# Patient Record
Sex: FEMALE | Race: WHITE | NOT HISPANIC OR LATINO | Employment: OTHER | URBAN - METROPOLITAN AREA
[De-identification: names, ages, dates, MRNs, and addresses within clinical notes are randomized per-mention and may not be internally consistent; named-entity substitution may affect disease eponyms.]

---

## 2017-02-23 ENCOUNTER — OFFICE VISIT (OUTPATIENT)
Dept: CARDIOLOGY | Facility: CLINIC | Age: 82
End: 2017-02-23

## 2017-02-23 VITALS
WEIGHT: 226.2 LBS | DIASTOLIC BLOOD PRESSURE: 86 MMHG | HEART RATE: 74 BPM | HEIGHT: 60 IN | BODY MASS INDEX: 44.41 KG/M2 | SYSTOLIC BLOOD PRESSURE: 120 MMHG

## 2017-02-23 DIAGNOSIS — Q21.12 PFO (PATENT FORAMEN OVALE): ICD-10-CM

## 2017-02-23 DIAGNOSIS — E78.5 DYSLIPIDEMIA: ICD-10-CM

## 2017-02-23 DIAGNOSIS — I25.9 ISCHEMIC HEART DISEASE: Primary | ICD-10-CM

## 2017-02-23 PROCEDURE — 99213 OFFICE O/P EST LOW 20 MIN: CPT | Performed by: INTERNAL MEDICINE

## 2017-02-23 RX ORDER — HYDROCODONE BITARTRATE AND ACETAMINOPHEN 5; 325 MG/1; MG/1
2 TABLET ORAL 3 TIMES DAILY
COMMUNITY
End: 2017-10-02 | Stop reason: HOSPADM

## 2017-02-23 RX ORDER — POLYETHYLENE GLYCOL 3350 17 G/17G
17 POWDER, FOR SOLUTION ORAL DAILY PRN
COMMUNITY
End: 2017-10-02 | Stop reason: HOSPADM

## 2017-02-23 NOTE — PROGRESS NOTES
Iliana Oleary  1934  [phone]      02/23/2017    Sreekanth Shi, APRN    Chief Complaint   Patient presents with   • Follow-up   • Coronary Artery Disease     PROBLEM LIST:  1.  Ischemic heart disease:  a. Cardiac catheterization: Dr. Warner,  01/06/2009.   i. PCI to proximal LAD (2.75 x 28 mm Promus CIRO).     b. Cardiac catheterization: Dr. Mejia, 08/03/2009: No in-stent restenosis or coronary artery obstruction; normal LV function.  c. Adenosine Cardiolite stress test, 04/30/2014:  Negative for ischemia and scar; LVEF (73%) without WMA.   d. Stress test with PET, 7/11/2016: EF 67%. No evidence of inducible ischemia by scintigraphic criteria.  e. Echocardiogram, 7/11/2016: EF 60%. Mild MR. Mild TR.  2. Hypertension.   3. Dyslipidemia.   4. PFO versus small ASD (echocardiogram on 05/06/2011) - asymptomatic.    5. Morbid obesity; BMI > 40.    6. Osteoarthritis.   7. Status post surgery, remote:  a. Hemorrhoidectomy, 1966.  b. Right  heel spur surgery, 1988.  c. Left ankle fracture stabilization, 1988.  d. Left hand trigger finger; Right hand trigger finger repair, 1996.    e. Cholecystectomy, May 2000.  f. Left ankle screw and bone chip removal, November 2001.  g. Anal fistulectomy,  October 2002.  h. Right flank abscess (staph), May 2005.  i. Left total knee replacement, October 2007.  j. Right knee replacement, August 2016      Allergies   Allergen Reactions   • Codeine    • Morphine And Related        Current Medications:      Current Outpatient Prescriptions:   •  aspirin 81 MG EC tablet, Take 81 mg by mouth every morning., Disp: , Rfl:   •  atorvastatin (LIPITOR) 40 MG tablet, Take 40 mg by mouth daily., Disp: , Rfl:   •  Budesonide (ENTOCORT EC) 3 MG 24 hr capsule, Take 3 mg by mouth 4 (Four) Times a Week., Disp: , Rfl:   •  diclofenac (VOLTAREN) 50 MG EC tablet, Take 50 mg by mouth 2 (two) times a day., Disp: , Rfl:   •  diphenhydrAMINE-acetaminophen (TYLENOL PM)  MG tablet per  "tablet, Take 1 tablet by mouth every night., Disp: , Rfl:   •  FLUoxetine (PROzac) 20 MG capsule, Take 20 mg by mouth daily., Disp: , Rfl:   •  gabapentin (NEURONTIN) 100 MG capsule, Take 200 mg by mouth every morning. 200mg every evening, Disp: , Rfl:   •  hydrochlorothiazide (HYDRODIURIL) 25 MG tablet, Take 25 mg by mouth daily., Disp: , Rfl:   •  HYDROcodone-acetaminophen (NORCO) 5-325 MG per tablet, Take 2 tablets by mouth 3 (Three) Times a Day., Disp: , Rfl:   •  lansoprazole (PREVACID) 30 MG capsule, Take 30 mg by mouth daily., Disp: , Rfl:   •  lisinopril (PRINIVIL,ZESTRIL) 10 MG tablet, Take 5 mg by mouth Daily., Disp: , Rfl:   •  Multiple Vitamins-Minerals (MULTIVITAMIN PO), Take 1 tablet by mouth daily., Disp: , Rfl:   •  polyethylene glycol (MIRALAX) packet, Take 17 g by mouth Daily., Disp: , Rfl:     HPI    Ms. Oleary presents today for follow up for ischemic heart disease. Since last visit, patient has been doing well from the cardiac standpoint.  She has been having chronic back pain that is seeming to get worse and is considering the possibility of having surgical intervention. Due to her chronic back pain, she is unable to exercise.  We discussed carb cycling to help with weight loss; she is interested in trying this.  Patient denies chest pain, palpitations, shortness of breath, edema, PND, orthopnea, dizziness, and syncope.     The following portions of the patient's history were reviewed and updated as appropriate: allergies, current medications and problem list.    Pertinent positives as listed in the HPI.  All other systems reviewed are negative.    Vitals:    02/23/17 1258   BP: 120/86   BP Location: Right arm   Patient Position: Sitting   Pulse: 74   Weight: 226 lb 3.2 oz (103 kg)   Height: 60\" (152.4 cm)       General: Alert and oriented  Neck: Jugular venous pressure is within normal limits. Carotids have normal upstrokes without bruits.   Cardiovascular: Heart has a nondisplaced focal " PMI. Regular rate and rhythm without murmur, gallop or rub.  Lungs: Clear without rales or wheezes. Equal expansion is noted.   Extremities: Show trace edema.  Skin: warm and dry.  Neurologic: nonfocal      Diagnostic Data:    Lab Results   Component Value Date    HGBA1C 5.70 07/11/2016     Lab Results   Component Value Date    CHOL 136 07/11/2016    TRIG 163 (H) 07/11/2016    HDL 37 (L) 07/11/2016    LDLDIRECT 71 07/11/2016    AST 30 07/10/2016    ALT 18 07/10/2016     Lab Results   Component Value Date    TSH 1.564 07/11/2016     Lab Results   Component Value Date    WBC 8.89 07/11/2016    HGB 12.6 07/11/2016    HCT 37.6 07/11/2016    MCV 91.9 07/11/2016     07/11/2016     Lab Results   Component Value Date    GLUCOSE 91 07/11/2016    CALCIUM 9.0 07/11/2016     07/11/2016    K 4.0 07/11/2016    CO2 27.0 07/11/2016     07/11/2016    BUN 18 07/11/2016    CREATININE 0.70 07/11/2016    EGFRIFNONA 80 07/11/2016    BCR 25.7 (H) 07/11/2016    ANIONGAP 11.0 07/11/2016     Lab Results   Component Value Date    INR 0.92 07/11/2016    INR 0.96 07/11/2016    PROTIME 10.0 07/11/2016    PROTIME 10.4 07/11/2016       Procedures    Assessment:      ICD-10-CM ICD-9-CM   1. Ischemic heart disease- stable; asymptomatic I25.9 414.9   2. PFO (patent foramen ovale) Q21.1 745.5   3. Dyslipidemia E78.5 272.4       Plan:    1. Ok to proceed with back surgery with Dr Gonzalez Priest, if she chooses to proceed. She must stay on her 81mg aspirin for surgery.    2. Continue current medications as directed.  3. F/up in 12 months or sooner if needed.    Scribed for Tracy Diego MD by ALAN Castro. 2/23/2017  1:04 PM      I, Tracy Diego MD, personally performed the services described in this documentation as scribed by the above named individual in my presence, and it is both accurate and complete.  2/27/2017  9:16 AM    Tracy Diego MD, FACC

## 2017-04-24 ENCOUNTER — TRANSCRIBE ORDERS (OUTPATIENT)
Dept: ADMINISTRATIVE | Facility: HOSPITAL | Age: 82
End: 2017-04-24

## 2017-04-24 DIAGNOSIS — Z12.31 VISIT FOR SCREENING MAMMOGRAM: Primary | ICD-10-CM

## 2017-06-15 ENCOUNTER — TELEPHONE (OUTPATIENT)
Dept: GASTROENTEROLOGY | Facility: CLINIC | Age: 82
End: 2017-06-15

## 2017-06-15 NOTE — TELEPHONE ENCOUNTER
Pt called requesting Budesonide to be refilled. I advised pt to call the pharmacy and have them send us a request. Pt verbalized understanding and we then ended the call.

## 2017-07-05 ENCOUNTER — HOSPITAL ENCOUNTER (OUTPATIENT)
Dept: MAMMOGRAPHY | Facility: HOSPITAL | Age: 82
Discharge: HOME | End: 2017-07-05
Admitting: NURSE PRACTITIONER

## 2017-07-05 DIAGNOSIS — Z12.31 VISIT FOR SCREENING MAMMOGRAM: ICD-10-CM

## 2017-07-05 PROCEDURE — G0202 SCR MAMMO BI INCL CAD: HCPCS | Performed by: RADIOLOGY

## 2017-07-05 PROCEDURE — 77063 BREAST TOMOSYNTHESIS BI: CPT | Performed by: RADIOLOGY

## 2017-07-05 PROCEDURE — 77063 BREAST TOMOSYNTHESIS BI: CPT

## 2017-07-05 PROCEDURE — G0202 SCR MAMMO BI INCL CAD: HCPCS

## 2017-09-11 ENCOUNTER — TRANSCRIBE ORDERS (OUTPATIENT)
Dept: ADMINISTRATIVE | Facility: HOSPITAL | Age: 82
End: 2017-09-11

## 2017-09-11 DIAGNOSIS — R06.02 SHORT OF BREATH ON EXERTION: Primary | ICD-10-CM

## 2017-09-18 ENCOUNTER — HOSPITAL ENCOUNTER (OUTPATIENT)
Dept: CT IMAGING | Facility: HOSPITAL | Age: 82
Discharge: HOME | End: 2017-09-18

## 2017-09-18 ENCOUNTER — TRANSCRIBE ORDERS (OUTPATIENT)
Dept: ADMINISTRATIVE | Facility: HOSPITAL | Age: 82
End: 2017-09-18

## 2017-09-18 ENCOUNTER — HOSPITAL ENCOUNTER (OUTPATIENT)
Dept: NUCLEAR MEDICINE | Facility: HOSPITAL | Age: 82
Discharge: HOME | End: 2017-09-18

## 2017-09-18 ENCOUNTER — HOSPITAL ENCOUNTER (OUTPATIENT)
Dept: GENERAL RADIOLOGY | Facility: HOSPITAL | Age: 82
Discharge: HOME | End: 2017-09-18

## 2017-09-18 ENCOUNTER — HOSPITAL ENCOUNTER (OUTPATIENT)
Facility: HOSPITAL | Age: 82
Setting detail: OBSERVATION
LOS: 1 days | Discharge: HOME | End: 2017-09-20
Attending: INTERNAL MEDICINE | Admitting: INTERNAL MEDICINE

## 2017-09-18 ENCOUNTER — APPOINTMENT (OUTPATIENT)
Dept: CT IMAGING | Facility: HOSPITAL | Age: 82
End: 2017-09-18

## 2017-09-18 DIAGNOSIS — R06.02 SOB (SHORTNESS OF BREATH): ICD-10-CM

## 2017-09-18 DIAGNOSIS — Z74.09 IMPAIRED MOBILITY AND ADLS: Primary | ICD-10-CM

## 2017-09-18 DIAGNOSIS — Z74.09 IMPAIRED FUNCTIONAL MOBILITY, BALANCE, GAIT, AND ENDURANCE: ICD-10-CM

## 2017-09-18 DIAGNOSIS — R06.02 SHORTNESS OF BREATH: ICD-10-CM

## 2017-09-18 DIAGNOSIS — N28.9 RENAL INSUFFICIENCY: ICD-10-CM

## 2017-09-18 DIAGNOSIS — R06.02 SHORT OF BREATH ON EXERTION: ICD-10-CM

## 2017-09-18 DIAGNOSIS — N18.30 CKD (CHRONIC KIDNEY DISEASE), STAGE III (HCC): Chronic | ICD-10-CM

## 2017-09-18 DIAGNOSIS — Z78.9 IMPAIRED MOBILITY AND ADLS: Primary | ICD-10-CM

## 2017-09-18 DIAGNOSIS — R06.02 SHORTNESS OF BREATH: Primary | ICD-10-CM

## 2017-09-18 DIAGNOSIS — I26.99 PULMONARY INFARCT (HCC): ICD-10-CM

## 2017-09-18 PROBLEM — I21.9 MYOCARDIAL INFARCTION (HCC): Chronic | Status: ACTIVE | Noted: 2017-09-18

## 2017-09-18 PROBLEM — I25.10 CORONARY ARTERY DISEASE: Chronic | Status: ACTIVE | Noted: 2017-09-18

## 2017-09-18 LAB
BNP SERPL-MCNC: 16 PG/ML (ref 0–100)
TROPONIN I SERPL-MCNC: 0.02 NG/ML

## 2017-09-18 PROCEDURE — A9558 XE133 XENON 10MCI: HCPCS | Performed by: NURSE PRACTITIONER

## 2017-09-18 PROCEDURE — 71250 CT THORAX DX C-: CPT

## 2017-09-18 PROCEDURE — 82565 ASSAY OF CREATININE: CPT

## 2017-09-18 PROCEDURE — 0 XENON XE 133: Performed by: NURSE PRACTITIONER

## 2017-09-18 PROCEDURE — 83880 ASSAY OF NATRIURETIC PEPTIDE: CPT | Performed by: FAMILY MEDICINE

## 2017-09-18 PROCEDURE — 71010 HC CHEST PA OR AP: CPT

## 2017-09-18 PROCEDURE — 78582 LUNG VENTILAT&PERFUS IMAGING: CPT

## 2017-09-18 PROCEDURE — 99220 PR INITIAL OBSERVATION CARE/DAY 70 MINUTES: CPT | Performed by: FAMILY MEDICINE

## 2017-09-18 PROCEDURE — G0378 HOSPITAL OBSERVATION PER HR: HCPCS

## 2017-09-18 PROCEDURE — 84484 ASSAY OF TROPONIN QUANT: CPT | Performed by: FAMILY MEDICINE

## 2017-09-18 PROCEDURE — 0 TECHNETIUM ALBUMIN AGGREGATED: Performed by: NURSE PRACTITIONER

## 2017-09-18 PROCEDURE — A9540 TC99M MAA: HCPCS | Performed by: NURSE PRACTITIONER

## 2017-09-18 RX ORDER — BUDESONIDE 3 MG/1
3 CAPSULE, COATED PELLETS ORAL
Status: DISCONTINUED | OUTPATIENT
Start: 2017-09-19 | End: 2017-09-20 | Stop reason: HOSPADM

## 2017-09-18 RX ORDER — ACETAMINOPHEN 325 MG/1
650 TABLET ORAL EVERY 6 HOURS PRN
Status: DISCONTINUED | OUTPATIENT
Start: 2017-09-18 | End: 2017-09-20 | Stop reason: HOSPADM

## 2017-09-18 RX ORDER — POLYETHYLENE GLYCOL 3350 17 G/17G
17 POWDER, FOR SOLUTION ORAL DAILY
Status: DISCONTINUED | OUTPATIENT
Start: 2017-09-19 | End: 2017-09-20 | Stop reason: HOSPADM

## 2017-09-18 RX ORDER — LISINOPRIL 5 MG/1
5 TABLET ORAL DAILY
Status: DISCONTINUED | OUTPATIENT
Start: 2017-09-19 | End: 2017-09-19

## 2017-09-18 RX ORDER — ATORVASTATIN CALCIUM 40 MG/1
40 TABLET, FILM COATED ORAL NIGHTLY
Status: DISCONTINUED | OUTPATIENT
Start: 2017-09-18 | End: 2017-09-20 | Stop reason: HOSPADM

## 2017-09-18 RX ORDER — LISINOPRIL 5 MG/1
5 TABLET ORAL DAILY
Status: DISCONTINUED | OUTPATIENT
Start: 2017-09-18 | End: 2017-09-18

## 2017-09-18 RX ORDER — FLUOXETINE HYDROCHLORIDE 20 MG/1
20 CAPSULE ORAL DAILY
Status: DISCONTINUED | OUTPATIENT
Start: 2017-09-18 | End: 2017-09-20 | Stop reason: HOSPADM

## 2017-09-18 RX ORDER — DIPHENHYDRAMINE HCL 25 MG
25 CAPSULE ORAL NIGHTLY PRN
Status: DISCONTINUED | OUTPATIENT
Start: 2017-09-18 | End: 2017-09-20 | Stop reason: HOSPADM

## 2017-09-18 RX ORDER — CALCIUM CARBONATE 200(500)MG
2 TABLET,CHEWABLE ORAL 2 TIMES DAILY PRN
Status: DISCONTINUED | OUTPATIENT
Start: 2017-09-18 | End: 2017-09-20 | Stop reason: HOSPADM

## 2017-09-18 RX ORDER — SODIUM CHLORIDE 0.9 % (FLUSH) 0.9 %
1-10 SYRINGE (ML) INJECTION AS NEEDED
Status: DISCONTINUED | OUTPATIENT
Start: 2017-09-18 | End: 2017-09-20 | Stop reason: HOSPADM

## 2017-09-18 RX ORDER — BISACODYL 10 MG
10 SUPPOSITORY, RECTAL RECTAL DAILY PRN
Status: DISCONTINUED | OUTPATIENT
Start: 2017-09-18 | End: 2017-09-20 | Stop reason: HOSPADM

## 2017-09-18 RX ORDER — GABAPENTIN 300 MG/1
300 CAPSULE ORAL EVERY MORNING
Status: DISCONTINUED | OUTPATIENT
Start: 2017-09-19 | End: 2017-09-20 | Stop reason: HOSPADM

## 2017-09-18 RX ORDER — ASPIRIN 81 MG/1
81 TABLET ORAL EVERY MORNING
Status: DISCONTINUED | OUTPATIENT
Start: 2017-09-19 | End: 2017-09-20 | Stop reason: HOSPADM

## 2017-09-18 RX ORDER — PANTOPRAZOLE SODIUM 40 MG/1
40 TABLET, DELAYED RELEASE ORAL EVERY MORNING
Status: DISCONTINUED | OUTPATIENT
Start: 2017-09-19 | End: 2017-09-20 | Stop reason: HOSPADM

## 2017-09-18 RX ORDER — HYDROCODONE BITARTRATE AND ACETAMINOPHEN 5; 325 MG/1; MG/1
2 TABLET ORAL 3 TIMES DAILY
Status: DISCONTINUED | OUTPATIENT
Start: 2017-09-18 | End: 2017-09-20 | Stop reason: HOSPADM

## 2017-09-18 RX ORDER — MULTIPLE VITAMINS W/ MINERALS TAB 9MG-400MCG
1 TAB ORAL DAILY
Status: DISCONTINUED | OUTPATIENT
Start: 2017-09-18 | End: 2017-09-18

## 2017-09-18 RX ORDER — BISACODYL 5 MG/1
5 TABLET, DELAYED RELEASE ORAL DAILY PRN
Status: DISCONTINUED | OUTPATIENT
Start: 2017-09-18 | End: 2017-09-20 | Stop reason: HOSPADM

## 2017-09-18 RX ORDER — MULTIPLE VITAMINS W/ MINERALS TAB 9MG-400MCG
1 TAB ORAL DAILY
Status: DISCONTINUED | OUTPATIENT
Start: 2017-09-19 | End: 2017-09-20 | Stop reason: HOSPADM

## 2017-09-18 RX ADMIN — Medication 1 DOSE: at 10:45

## 2017-09-18 RX ADMIN — XENON XE-133 14.11 MILLICURIE: 10 GAS RESPIRATORY (INHALATION) at 10:05

## 2017-09-18 RX ADMIN — APIXABAN 2.5 MG: 2.5 TABLET, FILM COATED ORAL at 21:09

## 2017-09-18 RX ADMIN — HYDROCODONE BITARTRATE AND ACETAMINOPHEN 2 TABLET: 5; 325 TABLET ORAL at 17:59

## 2017-09-18 RX ADMIN — ATORVASTATIN CALCIUM 40 MG: 40 TABLET, FILM COATED ORAL at 21:09

## 2017-09-18 RX ADMIN — FLUOXETINE HYDROCHLORIDE 20 MG: 20 CAPSULE ORAL at 17:59

## 2017-09-18 NOTE — PLAN OF CARE
Problem: Activity Intolerance (Adult)  Goal: Identify Related Risk Factors and Signs and Symptoms  Outcome: Ongoing (interventions implemented as appropriate)  Goal: Activity Tolerance  Outcome: Ongoing (interventions implemented as appropriate)  Goal: Effective Energy Conservation Techniques  Outcome: Ongoing (interventions implemented as appropriate)    Problem: Fall Risk (Adult)  Goal: Identify Related Risk Factors and Signs and Symptoms  Outcome: Ongoing (interventions implemented as appropriate)  Goal: Absence of Falls  Outcome: Ongoing (interventions implemented as appropriate)

## 2017-09-18 NOTE — H&P
[facility] Medicine Services  HISTORY AND PHYSICAL    Primary Care Physician: ALAN Marmolejo    Subjective     Chief Complaint:  Possible PE    History of Present Illness:   Ms. Oleary is a delightful 83 year old  woman who presents as a direct admit to [facility] for dyspnea and possible PE.  She saw ALAN Rosales on 9/13/2017 for dyspnea, generalized weakness and somnolence.  She did not have chest pain but does have chronic LE edema, right greater than left.  She also fell on the evening of 9/12 and the patient and  attributed the greater swelling in her right leg to the fall.  At some point a ddimer was drawn, which was positive at 1800.  On Friday 9/15/2017 the patient was put on Eliquis for concern about possible PE and further imaging was planned for today.  A CTA was attempted but creatinine of 1.6 precluded the study and a VQ scan showed intermediate probability of PE with a 3cm opacity of the right midlung which was postulated to be neoplasm versus PNA versus pulmonary infarct in the setting of PE.  Today the patient feels weak with some continued dyspnea.  She reports a poor appetite and continued pain and swelling in the right LE greater than left.  She has chronic arthritis pain and back pain as well.      Ms. Oleary's PMH includes HTN, CAD s/p MI and stents, CKD, HLD, OA, IBS and PFO versus small ASD based on ECHO 5/6/2011.      Review of Systems   Otherwise complete 10 system ROS performed and negative except as mentioned in the HPI.    Past Medical History:   Diagnosis Date   • Anxiety    • Arthritis    • Constipation    • Coronary artery disease    • Depression    • Dyslipidemia    • Hypertension    • IBS (irritable bowel syndrome)    • Ischemic heart disease    • Morbid obesity     ; BMI > 40.     • Myocardial infarction    • Osteoarthritis    • PFO (patent foramen ovale)     PFO versus small ASD (echocardiogram on 05/06/2011) - asymptomatic.          Past Surgical History:   Procedure Laterality Date   • ANAL FISSURECTOMY/FISTULECTOMY  10/2002   • ANAL FISTULA REPAIR     • ANKLE SURGERY Left 1988   • ANKLE SURGERY Left 11/2001    ANKLE SCREW AND BONE CHIP REMOVAL    • CATARACT EXTRACTION     • CHOLECYSTECTOMY  05/2000   • FOOT SURGERY  1988    RIGHT HEEL SPUR    • HEMORRHOIDECTOMY  1996   • KNEE ARTHROPLASTY Left 10/2007   • OTHER SURGICAL HISTORY Right 05/2005    Flank abcess (staph)       Family History   Problem Relation Age of Onset   • Breast cancer Neg Hx    • Ovarian cancer Neg Hx        Social History     Social History   • Marital status:      Spouse name: N/A   • Number of children: N/A   • Years of education: N/A     Occupational History   • Not on file.     Social History Main Topics   • Smoking status: Never Smoker   • Smokeless tobacco: Never Used   • Alcohol use No   • Drug use: No   • Sexual activity: Defer     Other Topics Concern   • Not on file     Social History Narrative   • No narrative on file       Medications:  Prescriptions Prior to Admission   Medication Sig Dispense Refill Last Dose   • aspirin 81 MG EC tablet Take 81 mg by mouth every morning.   Taking   • atorvastatin (LIPITOR) 40 MG tablet Take 40 mg by mouth daily.   Taking   • Budesonide (ENTOCORT EC) 3 MG 24 hr capsule Take 3 mg by mouth 4 (Four) Times a Week.   Taking   • diclofenac (VOLTAREN) 50 MG EC tablet Take 50 mg by mouth 2 (two) times a day.   Taking   • diphenhydrAMINE-acetaminophen (TYLENOL PM)  MG tablet per tablet Take 1 tablet by mouth every night.   Taking   • FLUoxetine (PROzac) 20 MG capsule Take 20 mg by mouth daily.   Taking   • gabapentin (NEURONTIN) 100 MG capsule Take 200 mg by mouth every morning. 200mg every evening   Taking   • hydrochlorothiazide (HYDRODIURIL) 25 MG tablet Take 25 mg by mouth daily.   Taking   • HYDROcodone-acetaminophen (NORCO) 5-325 MG per tablet Take 2 tablets by mouth 3 (Three) Times a Day.   Taking   •  lansoprazole (PREVACID) 30 MG capsule Take 30 mg by mouth daily.   Taking   • lisinopril (PRINIVIL,ZESTRIL) 10 MG tablet Take 5 mg by mouth Daily.   Taking   • Multiple Vitamins-Minerals (MULTIVITAMIN PO) Take 1 tablet by mouth daily.   Taking   • polyethylene glycol (MIRALAX) packet Take 17 g by mouth Daily.   Taking       Allergies:  Allergies   Allergen Reactions   • Codeine    • Morphine And Related          Objective     Physical Exam:  Vital Signs: /81  Pulse 82  Temp 97.7 °F (36.5 °C) (Oral)   Resp 16  Physical Exam  Constitutional: No acute distress, awake, alert, pale  Eyes: PERRLA, sclerae anicteric, no conjunctival injection  HENT: NCAT, mucous membranes moist  Neck: Supple, no thyromegaly, no lymphadenopathy, trachea midline  Respiratory: Clear to auscultation bilaterally, nonlabored respirations   Cardiovascular: RRR, or gallops, palpable pedal pulses bilaterally  Gastrointestinal: Positive bowel sounds, soft, nontender, nondistended  Musculoskeletal: 1+ LLE edema, 1-2+ RLE edema with mild redness around ankle   Psychiatric: Oriented x 3 but forgetful about recent details,  appropriate affect, cooperative  Neurologic: Strength symmetric in all extremities, Cranial Nerves grossly intact to confrontation, speech clear  Skin: No rashes    Results Reviewed:    Results from last 7 days  Lab Units 09/18/17  0915   WBC 10*3/mm3 11.97*   HEMOGLOBIN g/dL 11.9   PLATELETS 10*3/mm3 301       Results from last 7 days  Lab Units 09/18/17  1111   SODIUM mmol/L 140   POTASSIUM mmol/L 4.0   CO2 mmol/L 25.0   CREATININE mg/dL 1.60*   GLUCOSE mg/dL 106*   CALCIUM mg/dL 8.9     I have personally reviewed and interpreted available lab data, radiology studies and ECG obtained at time of admission.     Assessment / Plan     Problem List:   Hospital Problem List     * (Principal)Possible PE    Benign essential HTN    Ischemic heart disease    Hypertension    Dyslipidemia    PFO (patent foramen ovale)    Overview  "Signed 1/19/2017 12:00 PM by Lolly Gilliam     PFO versus small ASD (echocardiogram on 05/06/2011) - asymptomatic.           Coronary artery disease (Chronic)    Myocardial infarction (Chronic)    Shortness of breath    CKD (chronic kidney disease), stage III (Chronic)          Assessment: Ms. Oleary is an 83 year old  woman evaluated as an outpatient by her primary care provider for dyspnea and thought to have PE.  She has been on Eliquis since Friday 9/15/2017    Plan:  Possible PE with mention of 3cm \"mass\" right midlung field/dyspnea  --Continue Eliquis.  Will order 2.5 BID due to age and renal dysfunction.  --Venous duplex legs  --ECHO  --CT chest without contrast to further evaluate the 3cm mass.  It could certainly represent pulmonary infarct in the setting of PE, but will also try to evaluate for neoplasm or PNA.  --Consider pulmonary consultation +/- depending on results of CT chest    CKD III:  --Med recc not yet complete, will review and avoid nephrotoxins  --Choice not to use contrast for CT    HTN:  --Will review med recc when available and consider renal function when ordering meds    CAD, s/p MI, HLD      DVT prophylaxis: On eliquis  Code Status: full  Admission Status: Patient will be admitted to NICK Sánchez MD 09/18/17 2:50 PM        "

## 2017-09-19 ENCOUNTER — APPOINTMENT (OUTPATIENT)
Dept: ULTRASOUND IMAGING | Facility: HOSPITAL | Age: 82
End: 2017-09-19

## 2017-09-19 ENCOUNTER — APPOINTMENT (OUTPATIENT)
Dept: CARDIOLOGY | Facility: HOSPITAL | Age: 82
End: 2017-09-19
Attending: FAMILY MEDICINE

## 2017-09-19 PROBLEM — N18.30 CKD (CHRONIC KIDNEY DISEASE), STAGE III: Chronic | Status: RESOLVED | Noted: 2017-09-18 | Resolved: 2017-09-19

## 2017-09-19 PROBLEM — N28.9 RENAL INSUFFICIENCY: Status: ACTIVE | Noted: 2017-09-19

## 2017-09-19 PROBLEM — I26.99 PULMONARY INFARCT (HCC): Status: ACTIVE | Noted: 2017-09-19

## 2017-09-19 LAB
ANION GAP SERPL CALCULATED.3IONS-SCNC: 9 MMOL/L (ref 3–11)
BACTERIA UR QL AUTO: ABNORMAL /HPF
BH CV ECHO MEAS - AO MAX PG (FULL): 7.7 MMHG
BH CV ECHO MEAS - AO MAX PG: 13.2 MMHG
BH CV ECHO MEAS - AO MEAN PG (FULL): 6 MMHG
BH CV ECHO MEAS - AO MEAN PG: 9 MMHG
BH CV ECHO MEAS - AO ROOT AREA: 7.1 CM^2
BH CV ECHO MEAS - AO ROOT DIAM: 3 CM
BH CV ECHO MEAS - AO V2 MAX: 182 CM/SEC
BH CV ECHO MEAS - AO V2 MEAN: 143 CM/SEC
BH CV ECHO MEAS - AO V2 VTI: 37.5 CM
BH CV ECHO MEAS - AVA(I,A): 2 CM^2
BH CV ECHO MEAS - AVA(I,D): 2 CM^2
BH CV ECHO MEAS - AVA(V,A): 2.1 CM^2
BH CV ECHO MEAS - AVA(V,D): 2.1 CM^2
BH CV ECHO MEAS - CONTRAST EF (2CH): 86.8 ML/M^2
BH CV ECHO MEAS - CONTRAST EF 4CH: 86 ML/M^2
BH CV ECHO MEAS - EDV(CUBED): 54 ML
BH CV ECHO MEAS - EDV(MOD-SP2): 38 ML
BH CV ECHO MEAS - EDV(MOD-SP4): 50 ML
BH CV ECHO MEAS - EDV(TEICH): 61.2 ML
BH CV ECHO MEAS - EF(CUBED): 74.7 %
BH CV ECHO MEAS - EF(MOD-SP2): 86.8 %
BH CV ECHO MEAS - EF(MOD-SP4): 86 %
BH CV ECHO MEAS - EF(TEICH): 67.4 %
BH CV ECHO MEAS - ESV(CUBED): 13.7 ML
BH CV ECHO MEAS - ESV(MOD-SP2): 5 ML
BH CV ECHO MEAS - ESV(MOD-SP4): 7 ML
BH CV ECHO MEAS - ESV(TEICH): 20 ML
BH CV ECHO MEAS - FS: 36.8 %
BH CV ECHO MEAS - IVS/LVPW: 1.1
BH CV ECHO MEAS - IVSD: 1.6 CM
BH CV ECHO MEAS - LA DIMENSION: 4.4 CM
BH CV ECHO MEAS - LA/AO: 1.6
BH CV ECHO MEAS - LAT PEAK E' VEL: 6.4 CM/SEC
BH CV ECHO MEAS - LV MASS(C)D: 231.4 GRAMS
BH CV ECHO MEAS - LV MAX PG: 5.6 MMHG
BH CV ECHO MEAS - LV MEAN PG: 3 MMHG
BH CV ECHO MEAS - LV V1 MAX: 118 CM/SEC
BH CV ECHO MEAS - LV V1 MEAN: 84.7 CM/SEC
BH CV ECHO MEAS - LV V1 VTI: 22.3 CM
BH CV ECHO MEAS - LVIDD: 3.8 CM
BH CV ECHO MEAS - LVIDS: 2.4 CM
BH CV ECHO MEAS - LVLD AP2: 7.6 CM
BH CV ECHO MEAS - LVLD AP4: 7.4 CM
BH CV ECHO MEAS - LVLS AP2: 5.2 CM
BH CV ECHO MEAS - LVLS AP4: 5.6 CM
BH CV ECHO MEAS - LVOT AREA: 3.3 CM^2
BH CV ECHO MEAS - LVOT DIAM: 2 CM
BH CV ECHO MEAS - LVPWD: 1.5 CM
BH CV ECHO MEAS - MED PEAK E' VEL: 5.9 CM/SEC
BH CV ECHO MEAS - MV A MAX VEL: 103 CM/SEC
BH CV ECHO MEAS - MV E MAX VEL: 67.6 CM/SEC
BH CV ECHO MEAS - MV E/A: 0.66
BH CV ECHO MEAS - PA ACC SLOPE: 1023 CM/SEC^2
BH CV ECHO MEAS - PA ACC TIME: 0.11 SEC
BH CV ECHO MEAS - PA PR(ACCEL): 31.3 MMHG
BH CV ECHO MEAS - RAP SYSTOLE: 8 MMHG
BH CV ECHO MEAS - RVSP: 31 MMHG
BH CV ECHO MEAS - SV(AO): 265.1 ML
BH CV ECHO MEAS - SV(CUBED): 40.4 ML
BH CV ECHO MEAS - SV(LVOT): 73.5 ML
BH CV ECHO MEAS - SV(MOD-SP2): 33 ML
BH CV ECHO MEAS - SV(MOD-SP4): 43 ML
BH CV ECHO MEAS - SV(TEICH): 41.2 ML
BH CV ECHO MEAS - TAPSE (>1.6): 1.7 CM2
BH CV ECHO MEAS - TR MAX VEL: 238 CM/SEC
BH CV LOW VAS RIGHT COMMON FEMORAL SPONT: 1
BH CV LOW VAS RIGHT PERONEAL VESSEL: 1
BH CV LOW VAS RIGHT PROXIMAL FEMORAL SPONT: 1
BH CV LOW VAS RIGHT SAPHENOFEMORAL JUNCTION SPONT: 1
BH CV LOWER VASCULAR LEFT COMMON FEMORAL AUGMENT: NORMAL
BH CV LOWER VASCULAR LEFT COMMON FEMORAL COMPRESS: NORMAL
BH CV LOWER VASCULAR LEFT COMMON FEMORAL PHASIC: NORMAL
BH CV LOWER VASCULAR LEFT COMMON FEMORAL SPONT: NORMAL
BH CV LOWER VASCULAR LEFT DISTAL FEMORAL COMPRESS: NORMAL
BH CV LOWER VASCULAR LEFT GASTRONEMIUS COMPRESS: NORMAL
BH CV LOWER VASCULAR LEFT GREATER SAPH AK COMPRESS: NORMAL
BH CV LOWER VASCULAR LEFT GREATER SAPH BK COMPRESS: NORMAL
BH CV LOWER VASCULAR LEFT LESSER SAPH COMPRESS: NORMAL
BH CV LOWER VASCULAR LEFT MID FEMORAL AUGMENT: NORMAL
BH CV LOWER VASCULAR LEFT MID FEMORAL COMPRESS: NORMAL
BH CV LOWER VASCULAR LEFT MID FEMORAL PHASIC: NORMAL
BH CV LOWER VASCULAR LEFT MID FEMORAL SPONT: NORMAL
BH CV LOWER VASCULAR LEFT PERONEAL COMPRESS: NORMAL
BH CV LOWER VASCULAR LEFT POPLITEAL AUGMENT: NORMAL
BH CV LOWER VASCULAR LEFT POPLITEAL COMPRESS: NORMAL
BH CV LOWER VASCULAR LEFT POPLITEAL PHASIC: NORMAL
BH CV LOWER VASCULAR LEFT POPLITEAL SPONT: NORMAL
BH CV LOWER VASCULAR LEFT POSTERIOR TIBIAL COMPRESS: NORMAL
BH CV LOWER VASCULAR LEFT PROXIMAL FEMORAL COMPRESS: NORMAL
BH CV LOWER VASCULAR LEFT SAPHENOFEMORAL JUNCTION AUGMENT: NORMAL
BH CV LOWER VASCULAR LEFT SAPHENOFEMORAL JUNCTION COMPRESS: NORMAL
BH CV LOWER VASCULAR LEFT SAPHENOFEMORAL JUNCTION PHASIC: NORMAL
BH CV LOWER VASCULAR LEFT SAPHENOFEMORAL JUNCTION SPONT: NORMAL
BH CV LOWER VASCULAR RIGHT COMMON FEMORAL AUGMENT: NORMAL
BH CV LOWER VASCULAR RIGHT COMMON FEMORAL COMPETENT: NORMAL
BH CV LOWER VASCULAR RIGHT COMMON FEMORAL COMPRESS: NORMAL
BH CV LOWER VASCULAR RIGHT COMMON FEMORAL PHASIC: NORMAL
BH CV LOWER VASCULAR RIGHT COMMON FEMORAL SPONT: NORMAL
BH CV LOWER VASCULAR RIGHT DISTAL FEMORAL COMPRESS: NORMAL
BH CV LOWER VASCULAR RIGHT GASTRONEMIUS COMPRESS: NORMAL
BH CV LOWER VASCULAR RIGHT GREATER SAPH AK COMPRESS: NORMAL
BH CV LOWER VASCULAR RIGHT GREATER SAPH BK COMPRESS: NORMAL
BH CV LOWER VASCULAR RIGHT LESSER SAPH COMPRESS: NORMAL
BH CV LOWER VASCULAR RIGHT MID FEMORAL AUGMENT: NORMAL
BH CV LOWER VASCULAR RIGHT MID FEMORAL COMPRESS: NORMAL
BH CV LOWER VASCULAR RIGHT MID FEMORAL PHASIC: NORMAL
BH CV LOWER VASCULAR RIGHT MID FEMORAL SPONT: NORMAL
BH CV LOWER VASCULAR RIGHT PERONEAL COMPRESS: NORMAL
BH CV LOWER VASCULAR RIGHT POPLITEAL AUGMENT: NORMAL
BH CV LOWER VASCULAR RIGHT POPLITEAL COMPRESS: NORMAL
BH CV LOWER VASCULAR RIGHT POPLITEAL PHASIC: NORMAL
BH CV LOWER VASCULAR RIGHT POPLITEAL SPONT: NORMAL
BH CV LOWER VASCULAR RIGHT POSTERIOR TIBIAL COMPRESS: NORMAL
BH CV LOWER VASCULAR RIGHT PROXIMAL FEMORAL AUGMENT: NORMAL
BH CV LOWER VASCULAR RIGHT PROXIMAL FEMORAL COMPETENT: NORMAL
BH CV LOWER VASCULAR RIGHT PROXIMAL FEMORAL COMPRESS: NORMAL
BH CV LOWER VASCULAR RIGHT PROXIMAL FEMORAL PHASIC: NORMAL
BH CV LOWER VASCULAR RIGHT PROXIMAL FEMORAL SPONT: NORMAL
BH CV LOWER VASCULAR RIGHT SAPHENOFEMORAL JUNCTION AUGMENT: NORMAL
BH CV LOWER VASCULAR RIGHT SAPHENOFEMORAL JUNCTION COMPETENT: NORMAL
BH CV LOWER VASCULAR RIGHT SAPHENOFEMORAL JUNCTION COMPRESS: NORMAL
BH CV LOWER VASCULAR RIGHT SAPHENOFEMORAL JUNCTION PHASIC: NORMAL
BH CV LOWER VASCULAR RIGHT SAPHENOFEMORAL JUNCTION SPONT: NORMAL
BH CV VAS BP RIGHT ARM: NORMAL MMHG
BH CV XLRA - RV BASE: 4.7 CM
BH CV XLRA - RV LENGTH: 7.1 CM
BH CV XLRA - RV MID: 3.6 CM
BH CV XLRA - TDI S': 12.8 CM/SEC
BILIRUB UR QL STRIP: NEGATIVE
BUN BLD-MCNC: 38 MG/DL (ref 9–23)
BUN/CREAT SERPL: 22.4 (ref 7–25)
CALCIUM SPEC-SCNC: 8.7 MG/DL (ref 8.7–10.4)
CHLORIDE SERPL-SCNC: 104 MMOL/L (ref 99–109)
CLARITY UR: ABNORMAL
CO2 SERPL-SCNC: 25 MMOL/L (ref 20–31)
COLOR UR: YELLOW
CREAT BLD-MCNC: 1.7 MG/DL (ref 0.6–1.3)
CREAT BLDA-MCNC: 1.8 MG/DL (ref 0.6–1.3)
CREAT UR-MCNC: 102.4 MG/DL
DEPRECATED RDW RBC AUTO: 53.5 FL (ref 37–54)
EOSINOPHIL SPEC QL MICRO: 0 % EOS/100 CELLS (ref 0–0)
ERYTHROCYTE [DISTWIDTH] IN BLOOD BY AUTOMATED COUNT: 15.9 % (ref 11.3–14.5)
GFR SERPL CREATININE-BSD FRML MDRD: 29 ML/MIN/1.73
GLUCOSE BLD-MCNC: 103 MG/DL (ref 70–100)
GLUCOSE UR STRIP-MCNC: NEGATIVE MG/DL
HCT VFR BLD AUTO: 36.7 % (ref 34.5–44)
HGB BLD-MCNC: 10.9 G/DL (ref 11.5–15.5)
HGB UR QL STRIP.AUTO: NEGATIVE
HYALINE CASTS UR QL AUTO: ABNORMAL /LPF
KETONES UR QL STRIP: NEGATIVE
LEFT ATRIUM VOLUME INDEX: 19.5 ML/M2
LEUKOCYTE ESTERASE UR QL STRIP.AUTO: ABNORMAL
MCH RBC QN AUTO: 27.3 PG (ref 27–31)
MCHC RBC AUTO-ENTMCNC: 29.7 G/DL (ref 32–36)
MCV RBC AUTO: 91.8 FL (ref 80–99)
NITRITE UR QL STRIP: POSITIVE
PH UR STRIP.AUTO: <=5 [PH] (ref 5–8)
PLATELET # BLD AUTO: 283 10*3/MM3 (ref 150–450)
PMV BLD AUTO: 10.1 FL (ref 6–12)
POTASSIUM BLD-SCNC: 3.9 MMOL/L (ref 3.5–5.5)
PROT UR QL STRIP: NEGATIVE
RBC # BLD AUTO: 4 10*6/MM3 (ref 3.89–5.14)
RBC # UR: ABNORMAL /HPF
REF LAB TEST METHOD: ABNORMAL
SODIUM BLD-SCNC: 138 MMOL/L (ref 132–146)
SODIUM UR-SCNC: 39 MMOL/L (ref 30–90)
SP GR UR STRIP: 1.02 (ref 1–1.03)
SQUAMOUS #/AREA URNS HPF: ABNORMAL /HPF
UROBILINOGEN UR QL STRIP: ABNORMAL
UUN 24H UR-MCNC: 603 MG/DL
WBC NRBC COR # BLD: 11.4 10*3/MM3 (ref 3.5–10.8)
WBC UR QL AUTO: ABNORMAL /HPF

## 2017-09-19 PROCEDURE — 87205 SMEAR GRAM STAIN: CPT | Performed by: INTERNAL MEDICINE

## 2017-09-19 PROCEDURE — 82570 ASSAY OF URINE CREATININE: CPT | Performed by: INTERNAL MEDICINE

## 2017-09-19 PROCEDURE — 81001 URINALYSIS AUTO W/SCOPE: CPT | Performed by: FAMILY MEDICINE

## 2017-09-19 PROCEDURE — G0378 HOSPITAL OBSERVATION PER HR: HCPCS

## 2017-09-19 PROCEDURE — 93306 TTE W/DOPPLER COMPLETE: CPT | Performed by: INTERNAL MEDICINE

## 2017-09-19 PROCEDURE — 85027 COMPLETE CBC AUTOMATED: CPT | Performed by: FAMILY MEDICINE

## 2017-09-19 PROCEDURE — 84300 ASSAY OF URINE SODIUM: CPT | Performed by: INTERNAL MEDICINE

## 2017-09-19 PROCEDURE — 99226 PR SBSQ OBSERVATION CARE/DAY 35 MINUTES: CPT | Performed by: INTERNAL MEDICINE

## 2017-09-19 PROCEDURE — 93306 TTE W/DOPPLER COMPLETE: CPT

## 2017-09-19 PROCEDURE — 80048 BASIC METABOLIC PNL TOTAL CA: CPT | Performed by: FAMILY MEDICINE

## 2017-09-19 PROCEDURE — 84155 ASSAY OF PROTEIN SERUM: CPT | Performed by: INTERNAL MEDICINE

## 2017-09-19 PROCEDURE — 84165 PROTEIN E-PHORESIS SERUM: CPT | Performed by: INTERNAL MEDICINE

## 2017-09-19 PROCEDURE — 84166 PROTEIN E-PHORESIS/URINE/CSF: CPT | Performed by: INTERNAL MEDICINE

## 2017-09-19 PROCEDURE — 93970 EXTREMITY STUDY: CPT | Performed by: INTERNAL MEDICINE

## 2017-09-19 PROCEDURE — 84156 ASSAY OF PROTEIN URINE: CPT | Performed by: INTERNAL MEDICINE

## 2017-09-19 PROCEDURE — 84540 ASSAY OF URINE/UREA-N: CPT | Performed by: INTERNAL MEDICINE

## 2017-09-19 PROCEDURE — 76775 US EXAM ABDO BACK WALL LIM: CPT

## 2017-09-19 PROCEDURE — 93970 EXTREMITY STUDY: CPT

## 2017-09-19 RX ORDER — SODIUM CHLORIDE 9 MG/ML
125 INJECTION, SOLUTION INTRAVENOUS CONTINUOUS
Status: DISCONTINUED | OUTPATIENT
Start: 2017-09-19 | End: 2017-09-20 | Stop reason: HOSPADM

## 2017-09-19 RX ADMIN — FLUOXETINE HYDROCHLORIDE 20 MG: 20 CAPSULE ORAL at 08:21

## 2017-09-19 RX ADMIN — APIXABAN 10 MG: 5 TABLET, FILM COATED ORAL at 20:22

## 2017-09-19 RX ADMIN — MULTIPLE VITAMINS W/ MINERALS TAB 1 TABLET: TAB ORAL at 08:21

## 2017-09-19 RX ADMIN — GABAPENTIN 300 MG: 300 CAPSULE ORAL at 06:26

## 2017-09-19 RX ADMIN — APIXABAN 10 MG: 5 TABLET, FILM COATED ORAL at 08:21

## 2017-09-19 RX ADMIN — ASPIRIN 81 MG: 81 TABLET, COATED ORAL at 06:26

## 2017-09-19 RX ADMIN — HYDROCODONE BITARTRATE AND ACETAMINOPHEN 2 TABLET: 5; 325 TABLET ORAL at 17:34

## 2017-09-19 RX ADMIN — BUDESONIDE 3 MG: 3 CAPSULE ORAL at 08:21

## 2017-09-19 RX ADMIN — ATORVASTATIN CALCIUM 40 MG: 40 TABLET, FILM COATED ORAL at 20:22

## 2017-09-19 RX ADMIN — HYDROCODONE BITARTRATE AND ACETAMINOPHEN 2 TABLET: 5; 325 TABLET ORAL at 08:21

## 2017-09-19 RX ADMIN — SODIUM CHLORIDE 125 ML/HR: 9 INJECTION, SOLUTION INTRAVENOUS at 14:34

## 2017-09-19 RX ADMIN — PANTOPRAZOLE SODIUM 40 MG: 40 TABLET, DELAYED RELEASE ORAL at 06:26

## 2017-09-19 RX ADMIN — LISINOPRIL 5 MG: 5 TABLET ORAL at 08:21

## 2017-09-19 NOTE — PROGRESS NOTES
Discharge Planning Assessment  [facility]     Patient Name: Iliana Oleary  MRN: [id number]  Today's Date: 9/19/2017    Admit Date: 9/18/2017          Discharge Needs Assessment       09/19/17 1052    Living Environment    Lives With spouse    Living Arrangements house    Home Accessibility no concerns    Stair Railings at Home outside, present on left side    Type of Financial/Environmental Concern none    Transportation Available car;family or friend will provide    Living Environment    Provides Primary Care For no one    Primary Care Provided By spouse/significant other;child(steven) (specify)    Quality Of Family Relationships supportive;helpful;involved    Able to Return to Prior Living Arrangements yes    Discharge Needs Assessment    Concerns To Be Addressed discharge planning concerns    Readmission Within The Last 30 Days no previous admission in last 30 days    Anticipated Changes Related to Illness none    Equipment Currently Used at Home walker, rolling;bath bench;lift device;grab bar    Equipment Needed After Discharge wheelchair    Discharge Disposition home healthcare service            Discharge Plan       09/19/17 1053    Case Management/Social Work Plan    Plan Home with Home Health     Patient/Family In Agreement With Plan yes    Additional Comments Spoke with patient,  and daughter at bedside. Patient lives in [place] with her  who cooks every meal and brings it to his wife who mostly stays upstairs where her room and bathroom are. Their stairs has a chair lift so she is able to come downstairs whenever she wants.  does all the cleaning and grocery shopping as well. Daughter is nearby and stops in to help when needed.  has asked me to look into ordering a transportation chair for aided mobility at dr degroot. And patient will likely benefit from Home health PT/OT and aide. Will await PT/OT notes and then send in official refferal for Home Health - They have  requested [facility] as they have had their services in the past - CM to follow - Malini Blank rn/cm         Discharge Placement     No information found                Demographic Summary       09/19/17 1039    Referral Information    Admission Type observation    Arrived From still a patient    Referral Source admission list    Reason For Consult discharge planning    Contact Information    Permission Granted to Share Information With     Primary Care Physician Information    Name Sreekanth Shi             Functional Status       09/19/17 1043    Functional Status Current    Current Functional Level Comment Please see nurses notes     Functional Status Prior    Ambulation 0-->independent    Transferring 0-->independent    Toileting 0-->independent    Bathing 0-->independent    Dressing 0-->independent    Eating 0-->independent    Communication 0-->understands/communicates without difficulty    Swallowing 0-->swallows foods/liquids without difficulty    IADL    Medications independent    Meal Preparation assistive person    Housekeeping assistive person    Laundry assistive person    Shopping assistive person    Oral Care independent    Activity Tolerance    Current Activity Limitations weakness severe, generalized    Usual Activity Tolerance moderate    Current Activity Tolerance poor    Cognitive/Perceptual/Developmental    Current Mental Status/Cognitive Functioning no deficits noted    Employment/Financial    Employment/Finance Comments [employer]             Psychosocial     None            Abuse/Neglect     None            Legal     None            Substance Abuse     None            Patient Forms     None          Malini Blank, BIJAN

## 2017-09-19 NOTE — PROGRESS NOTES
"      HOSPITALIST DAILY PROGRESS NOTE    Chief Complaint: dyspnea, weakness    Subjective   SUBJECTIVE/OVERNIGHT EVENTS   Lying in bed with family in room. Still dyspneic, but apparently the reason for presentation to ED was because of her worsening creatinine and fatigue. Patient very sleepy today.    Review of Systems:  Gen-no fevers, no chills  CV-no chest pain, no palpitations  Resp-no cough, no dyspnea  GI-no N/V/D, no abd pain    Otherwise complete ROS is negative except as mentioned in the HPI.    Objective   OBJECTIVE   I have reviewed the vital signs.  BP 97/66 (BP Location: Left arm, Patient Position: Lying)  Pulse 74  Temp 97.6 °F (36.4 °C) (Oral)   Resp 18  Ht 60\" (152.4 cm)  Wt 208 lb (94.3 kg)  SpO2 96%  BMI 40.62 kg/m2    Physical Exam:  Gen-no acute distress, sleepy  CV-RRR, S1 S2 normal, no m/r/g  Resp-CTAB, no wheezes  Abd-soft, NT, ND, +BS, obese  Ext-no edema  Neuro-A&Ox3, no focal deficits  Psych-appropriate mood    Results:  I have reviewed the labs, culture data, radiology results, and diagnostic studies.      Results from last 7 days  Lab Units 09/19/17  0458 09/18/17  0915   WBC 10*3/mm3 11.40* 11.97*   HEMOGLOBIN g/dL 10.9* 11.9   HEMATOCRIT % 36.7 38.3   PLATELETS 10*3/mm3 283 301       Results from last 7 days  Lab Units 09/19/17  0458   SODIUM mmol/L 138   POTASSIUM mmol/L 3.9   CHLORIDE mmol/L 104   CO2 mmol/L 25.0   BUN mg/dL 38*   CREATININE mg/dL 1.70*   GLUCOSE mg/dL 103*   CALCIUM mg/dL 8.7       Radiology Results: CT chest personally reviewed with wedge shaped infiltrate c/w infarct. Agree with interpretation.  Imaging Results (last 24 hours)     Procedure Component Value Units Date/Time    CT Chest Without Contrast [[id number]] Collected:  09/18/17 1633     Updated:  09/18/17 2156    Narrative:       EXAMINATION: CT CHEST WO CONTRAST-09/18/2017:      INDICATION: Right lung 3 cm \"mass\" in the setting of possible PE (see VQ  report) versus neoplasm versus PNA.       "   TECHNIQUE: 5 mm unenhanced images through the chest and upper abdomen.     The radiation dose reduction device was turned on for each scan per the  ALARA (As Low as Reasonably Achievable) protocol.     COMPARISON: 09/18/2017 chest CT scan, and portable chest radiograph of  today's date as well.     FINDINGS: The previously noted right mid lung density is a wedge shaped  pleural-based 2.2 cm lesion in the superior segment of the right lower  lobe, which could represent a focus of pneumonia, but is also  approximately the expected shape of a pulmonary infarct. There is no  evidence of potential infarct elsewhere, no evidence of pulmonary edema,  or evidence of significant effusion. There is mild coarsening of the  interstitial lung markings and a few granulomatous calcifications. The  lungs otherwise appear clear.     Mediastinal window images show no pericardial effusion. There is a left  coronary artery stent. No adenopathy is seen. The pulmonary arteries are  enlarged, to approximately 3.6 cm at the level of the main pulmonary  artery, 2.9 cm for the right pulmonary artery and 3.2 cm for the left  pulmonary artery.     Included images of the upper abdomen show no significant abnormalities  of the visualized portions of the spleen, pancreas, adrenal glands, or  upper renal poles. Clips are seen in the gallbladder fossa. The liver  appears grossly normal. A single bubble of air is seen in the proximal  common hepatic duct or distal right lobe biliary radical, presumably as  a result of previous cholecystectomy and perhaps sphincterotomy.       Impression:       1. Wedge-shaped 2.2 cm pleural-based opacity in the right lower lobe  superior segment which corresponds to the patient's chest x-ray  abnormality. There are no particular features of neoplasm. Differential  includes pneumonia, postinflammatory scar, or pulmonary infarct.  2. Diffuse ectasia of the main pulmonary arteries, suggesting underlying  pulmonary  arterial hypertension may be present.     D:  09/18/2017  E:  09/18/2017           This report was finalized on 9/18/2017 9:54 PM by DR. Lc Ge MD.       US Renal Limited [[id number]] Updated:  09/19/17 1216          I have reviewed the medications.      Assessment/Plan   ASSESSMENT/PLAN    Principal Problem:    Renal insufficiency  Active Problems:    Benign essential HTN    Hypertension    Dyslipidemia    PFO (patent foramen ovale)    Coronary artery disease    Myocardial infarction    Pulmonary embolism    Shortness of breath    Pulmonary infarct    83 year old female brought in by her family due to worsening fatigue and worsening renal function noted by her PCP.    Plan:  --As far as her presumed blood clot goes, I will consider this issue resolved. She has elevated d-dimer, intermed V/Q scan, and pulmonary infarct on CT. Discussed Eliquis dosing with pharmacy, for VTE treatment full dose Eliquis is indicated. Needs treated for 3 months.  --Apparently the real reason for her admission was due to her worsening creatinine. Apparently this just started within the last 1 month and her last noted creatinine prior to that was normal. This may be due to CKD as she does have longstanding hypertension and ischemic heart disease vs a more acute cause. Her U/A is bland. Will check urine studies, renal us, and start IVF. She has an appointment on Friday with nephrology but will ask them to see her here as this is reason for admission and due to her general debility.  --PT/OT. CM.  --Labs in am.  --Home in 24-48 hours pending renal eval.    Elizabeth Hicks II, DO  09/19/17  12:48 PM

## 2017-09-19 NOTE — PLAN OF CARE
Problem: Patient Care Overview (Adult)  Goal: Plan of Care Review  Outcome: Ongoing (interventions implemented as appropriate)    09/19/17 8279   Coping/Psychosocial Response Interventions   Plan Of Care Reviewed With patient   Patient Care Overview   Progress progress toward functional goals as expected   Outcome Evaluation   Outcome Summary/Follow up Plan Pt is has had difficulty urinating. Straight cathed pt twice without result. APRN aware. Pt voids small amount spontaneously but does not fully empty bladder.

## 2017-09-19 NOTE — CONSULTS
NAL Consult Note    Iliana Oleary  1934  [id number]    Date of Admit:  9/18/2017    Date of Consult: 9/19/2017        Requesting Provider: Ej Payne MD    Evaluating Physician: Dhiraj Dolan MD    Reason for Consultation:  Elevated Cr    Chief Complaint:  SOB    History of present illness:    Patient is a 83 y.o.  Yr old female with recent diagnosis of CKD.  Was recently referred to [facility] for elevated Cr, but hadn't seen us yet.  Pt with several day of increased SOB with weakness.   Was sent for evaluation.  Was unable to get CT PE protochol, because of Cr elevated.  VQ scan was done with intermediate prob.  Nephrology was consutled to evaluate decreased renal functio.  Pt feeling better today  Getting IVF.       Past Medical History  Past Medical History:   Diagnosis Date   • Anxiety    • Arthritis    • Constipation    • Coronary artery disease    • Depression    • Dyslipidemia    • Hypertension    • IBS (irritable bowel syndrome)    • Ischemic heart disease    • Morbid obesity     ; BMI > 40.     • Myocardial infarction    • Osteoarthritis    • PFO (patent foramen ovale)     PFO versus small ASD (echocardiogram on 05/06/2011) - asymptomatic.         Past Surgical History:   Procedure Laterality Date   • ANAL FISSURECTOMY/FISTULECTOMY  10/2002   • ANAL FISTULA REPAIR     • ANKLE SURGERY Left 1988   • ANKLE SURGERY Left 11/2001    ANKLE SCREW AND BONE CHIP REMOVAL    • CATARACT EXTRACTION     • CHOLECYSTECTOMY  05/2000   • FOOT SURGERY  1988    RIGHT HEEL SPUR    • HEMORRHOIDECTOMY  1996   • KNEE ARTHROPLASTY Left 10/2007   • OTHER SURGICAL HISTORY Right 05/2005    Flank abcess (staph)       Allergies:  Allergies   Allergen Reactions   • Codeine    • Morphine And Related        Medication:   See electronic record    Soc Hx:   Social History     Social History   • Marital status:      Spouse name: N/A   • Number of children: N/A   • Years of education: N/A     Social History Main  "Topics   • Smoking status: Never Smoker   • Smokeless tobacco: Never Used   • Alcohol use No   • Drug use: No   • Sexual activity: Defer     Other Topics Concern   • None     Social History Narrative   • None       Fam Hx:  No congenital renal disease      Review of Systems:  Full review of systems reviewed and are as above  In HPI or per admitting H&P,otherwise negative for acute complaints    Physical Exam:   Vital Signs   Blood pressure 97/66, pulse 74, temperature 97.6 °F (36.4 °C), temperature source Oral, resp. rate 18, height 60\" (152.4 cm), weight 208 lb (94.3 kg), SpO2 96 %.       GENERAL: WD WF NAD.   PSYCHIATRIC:  Awake and alert,  Normal mood and affect. Cooperative with PE  EYE: PE, no icterus, no conjunctivitis  ENT: ommm, dentition intact,  Hearing intact  NECK: Supple , No JVD discernable,  Trachea midline, no palp thyroid  CV: RRR;  + edema  LUNGS:  Quiet,  Nonlabored resp.  Clear to auscultation bilaterally.  ABDOMEN: Soft, nontender, nondistended. BS present.  : no palp bladder, no browne  SKIN: Warm and dry without rash    Laboratory Data    Results from last 7 days  Lab Units 09/19/17  0458 09/18/17  0915   HEMOGLOBIN g/dL 10.9* 11.9   HEMATOCRIT % 36.7 38.3       Results from last 7 days  Lab Units 09/19/17  0458 09/18/17  1111   SODIUM mmol/L 138 140   POTASSIUM mmol/L 3.9 4.0   CHLORIDE mmol/L 104 103   CO2 mmol/L 25.0 25.0   BUN mg/dL 38* 34*   CREATININE mg/dL 1.70* 1.60*   CALCIUM mg/dL 8.7 8.9   PHOSPHORUS mg/dL  --  3.4   ALBUMIN g/dL  --  3.60                 Estimated Creatinine Clearance: 25.7 mL/min (by C-G formula based on Cr of 1.7).    A/P:      ARF:  ? baseline.  Cr upto 1.7 today.  No NSAIDs.  No IV contrast.  BP low side.  Will get urine labs.  U/s stable.       CKD3:  Will get old records for baseline Cr.     Hypotension:  Getting IVF.  Can give albumin if needed    Anemia:  stable.     SOB:  W/U for PE underway.  ;likely pulm infarct on CT scan.      Thank you consulting us " on Iliana Oleary who is of high risk and complexity.  We will follow along closely    Dhiraj Dolan MD  9/19/2017  3:06 PM

## 2017-09-19 NOTE — PLAN OF CARE
Problem: Patient Care Overview (Adult)  Goal: Plan of Care Review    09/19/17 0840   Coping/Psychosocial Response Interventions   Plan Of Care Reviewed With patient;spouse   Patient Care Overview   Progress no change   Outcome Evaluation   Outcome Summary/Follow up Plan Patient consult for possible DTI (POA) on coccyx. Area determined to be bruising-patient reports falling onto toilet seat on Sunday. All areas blanching-Z guard applied. See skin care orders and LDA's. Please place waffle mattress on bed for patient comfort. All measures in place and implemented. WOCN will sign off for now. Please consult for any new concerns.          Problem: Skin Integrity Impairment, Risk/Actual (Adult)  Goal: Identify Related Risk Factors and Signs and Symptoms  Outcome: Ongoing (interventions implemented as appropriate)    09/19/17 0840   Skin Integrity Impairment, Risk/Actual   Skin Integrity Impairment, Risk/Actual: Related Risk Factors age extremes;traumatic injury   Signs and Symptoms (Skin Integrity Impairment) (eccymosis on coccyx and glutes)       Goal: Skin Integrity/Wound Healing  Outcome: Ongoing (interventions implemented as appropriate)    09/19/17 0840   Skin Integrity Impairment, Risk/Actual (Adult)   Skin Integrity/Wound Healing making progress toward outcome

## 2017-09-19 NOTE — PROGRESS NOTES
Discharge Planning Assessment  [facility]     Patient Name: Iliana Oleary  MRN: [id number]  Today's Date: 9/19/2017    Admit Date: 9/18/2017          Discharge Needs Assessment       09/19/17 1052    Living Environment    Lives With spouse    Living Arrangements house    Home Accessibility no concerns    Stair Railings at Home outside, present on left side    Type of Financial/Environmental Concern none    Transportation Available car;family or friend will provide    Living Environment    Provides Primary Care For no one    Primary Care Provided By spouse/significant other;child(steven) (specify)    Quality Of Family Relationships supportive;helpful;involved    Able to Return to Prior Living Arrangements yes    Discharge Needs Assessment    Concerns To Be Addressed discharge planning concerns    Readmission Within The Last 30 Days no previous admission in last 30 days    Anticipated Changes Related to Illness none    Equipment Currently Used at Home walker, rolling;bath bench;lift device;grab bar    Equipment Needed After Discharge wheelchair    Discharge Disposition home healthcare service            Discharge Plan       09/19/17 1053    Case Management/Social Work Plan    Plan Home with Home Health     Patient/Family In Agreement With Plan yes    Additional Comments Spoke with patient,  and daughter at bedside. Patient lives in [place] with her  who cooks every meal and brings it to his wife who mostly stays upstairs where her room and bathroom are. Their stairs has a chair lift so she is able to come downstairs whenever she wants.  does all the cleaning and grocery shopping as well. Daughter is nearby and stops in to help when needed.  has asked me to look into ordering a transportation chair for aided mobility at dr degroot. And patient will likely benefit from Home health PT/OT and aide. Will await PT/OT notes and then send in official refferal for Home Health - They have  requested [facility] as they have had their services in the past - CM to follow - Malini Blank rn/cm         Discharge Placement     No information found                Demographic Summary       09/19/17 1039    Referral Information    Admission Type observation    Arrived From still a patient    Referral Source admission list    Reason For Consult discharge planning    Contact Information    Permission Granted to Share Information With     Primary Care Physician Information    Name Sreekanth Shi             Functional Status       09/19/17 1043    Functional Status Current    Current Functional Level Comment Please see nurses notes     Functional Status Prior    Ambulation 0-->independent    Transferring 0-->independent    Toileting 0-->independent    Bathing 0-->independent    Dressing 0-->independent    Eating 0-->independent    Communication 0-->understands/communicates without difficulty    Swallowing 0-->swallows foods/liquids without difficulty    IADL    Medications independent    Meal Preparation assistive person    Housekeeping assistive person    Laundry assistive person    Shopping assistive person    Oral Care independent    Activity Tolerance    Current Activity Limitations weakness severe, generalized    Usual Activity Tolerance moderate    Current Activity Tolerance poor    Cognitive/Perceptual/Developmental    Current Mental Status/Cognitive Functioning no deficits noted    Employment/Financial    Employment/Finance Comments [employer]             Psychosocial     None            Abuse/Neglect     None            Legal     None            Substance Abuse     None            Patient Forms     None          Malini Blank, BIJAN

## 2017-09-20 VITALS
SYSTOLIC BLOOD PRESSURE: 119 MMHG | DIASTOLIC BLOOD PRESSURE: 86 MMHG | WEIGHT: 208 LBS | RESPIRATION RATE: 18 BRPM | BODY MASS INDEX: 40.84 KG/M2 | TEMPERATURE: 98.2 F | OXYGEN SATURATION: 93 % | HEART RATE: 92 BPM | HEIGHT: 60 IN

## 2017-09-20 PROBLEM — N39.0 UTI (URINARY TRACT INFECTION): Status: ACTIVE | Noted: 2017-09-20

## 2017-09-20 LAB
ALBUMIN SERPL-MCNC: 2.5 G/DL (ref 2.9–4.4)
ALBUMIN/GLOB SERPL: 0.9 {RATIO} (ref 0.7–1.7)
ALPHA1 GLOB FLD ELPH-MCNC: 0.4 G/DL (ref 0–0.4)
ALPHA2 GLOB SERPL ELPH-MCNC: 1 G/DL (ref 0.4–1)
ANION GAP SERPL CALCULATED.3IONS-SCNC: 5 MMOL/L (ref 3–11)
B-GLOBULIN SERPL ELPH-MCNC: 1 G/DL (ref 0.7–1.3)
BUN BLD-MCNC: 31 MG/DL (ref 9–23)
BUN/CREAT SERPL: 31 (ref 7–25)
CALCIUM SPEC-SCNC: 8.6 MG/DL (ref 8.7–10.4)
CHLORIDE SERPL-SCNC: 108 MMOL/L (ref 99–109)
CO2 SERPL-SCNC: 26 MMOL/L (ref 20–31)
CREAT BLD-MCNC: 1 MG/DL (ref 0.6–1.3)
GAMMA GLOB SERPL ELPH-MCNC: 0.6 G/DL (ref 0.4–1.8)
GFR SERPL CREATININE-BSD FRML MDRD: 53 ML/MIN/1.73
GLOBULIN SER CALC-MCNC: 2.9 G/DL (ref 2.2–3.9)
GLUCOSE BLD-MCNC: 109 MG/DL (ref 70–100)
Lab: ABNORMAL
M-SPIKE: ABNORMAL G/DL
POTASSIUM BLD-SCNC: 3.9 MMOL/L (ref 3.5–5.5)
PROT PATTERN SERPL ELPH-IMP: ABNORMAL
PROT SERPL-MCNC: 5.4 G/DL (ref 6–8.5)
SODIUM BLD-SCNC: 139 MMOL/L (ref 132–146)

## 2017-09-20 PROCEDURE — G8989 SELF CARE D/C STATUS: HCPCS

## 2017-09-20 PROCEDURE — 84165 PROTEIN E-PHORESIS SERUM: CPT | Performed by: INTERNAL MEDICINE

## 2017-09-20 PROCEDURE — 97165 OT EVAL LOW COMPLEX 30 MIN: CPT

## 2017-09-20 PROCEDURE — 97161 PT EVAL LOW COMPLEX 20 MIN: CPT

## 2017-09-20 PROCEDURE — G8987 SELF CARE CURRENT STATUS: HCPCS

## 2017-09-20 PROCEDURE — 99217 PR OBSERVATION CARE DISCHARGE MANAGEMENT: CPT | Performed by: INTERNAL MEDICINE

## 2017-09-20 PROCEDURE — G8978 MOBILITY CURRENT STATUS: HCPCS

## 2017-09-20 PROCEDURE — G8979 MOBILITY GOAL STATUS: HCPCS

## 2017-09-20 PROCEDURE — G0378 HOSPITAL OBSERVATION PER HR: HCPCS

## 2017-09-20 PROCEDURE — 96365 THER/PROPH/DIAG IV INF INIT: CPT

## 2017-09-20 PROCEDURE — G8980 MOBILITY D/C STATUS: HCPCS

## 2017-09-20 PROCEDURE — 84155 ASSAY OF PROTEIN SERUM: CPT | Performed by: INTERNAL MEDICINE

## 2017-09-20 PROCEDURE — G8988 SELF CARE GOAL STATUS: HCPCS

## 2017-09-20 PROCEDURE — 80048 BASIC METABOLIC PNL TOTAL CA: CPT | Performed by: INTERNAL MEDICINE

## 2017-09-20 PROCEDURE — 97116 GAIT TRAINING THERAPY: CPT

## 2017-09-20 PROCEDURE — 25010000002 CEFTRIAXONE PER 250 MG: Performed by: INTERNAL MEDICINE

## 2017-09-20 PROCEDURE — 97110 THERAPEUTIC EXERCISES: CPT

## 2017-09-20 RX ORDER — CEFTRIAXONE SODIUM 1 G/50ML
1 INJECTION, SOLUTION INTRAVENOUS
Status: DISCONTINUED | OUTPATIENT
Start: 2017-09-20 | End: 2017-09-20 | Stop reason: HOSPADM

## 2017-09-20 RX ORDER — LISINOPRIL 10 MG/1
10 TABLET ORAL DAILY
Qty: 30 TABLET | Refills: 0 | Status: ON HOLD | OUTPATIENT
Start: 2017-09-20 | End: 2017-09-24

## 2017-09-20 RX ORDER — ACETAMINOPHEN 325 MG/1
650 TABLET ORAL EVERY 6 HOURS PRN
Start: 2017-09-20 | End: 2019-03-14

## 2017-09-20 RX ORDER — CEFUROXIME AXETIL 250 MG/1
250 TABLET ORAL 2 TIMES DAILY
Qty: 12 TABLET | Refills: 0 | Status: SHIPPED | OUTPATIENT
Start: 2017-09-20 | End: 2017-10-02 | Stop reason: HOSPADM

## 2017-09-20 RX ADMIN — CEFTRIAXONE SODIUM 1 G: 1 INJECTION, SOLUTION INTRAVENOUS at 09:32

## 2017-09-20 RX ADMIN — MULTIPLE VITAMINS W/ MINERALS TAB 1 TABLET: TAB ORAL at 09:32

## 2017-09-20 RX ADMIN — HYDROCODONE BITARTRATE AND ACETAMINOPHEN 2 TABLET: 5; 325 TABLET ORAL at 09:32

## 2017-09-20 RX ADMIN — APIXABAN 10 MG: 5 TABLET, FILM COATED ORAL at 09:32

## 2017-09-20 RX ADMIN — GABAPENTIN 300 MG: 300 CAPSULE ORAL at 06:01

## 2017-09-20 RX ADMIN — POLYETHYLENE GLYCOL 3350 17 G: 17 POWDER, FOR SOLUTION ORAL at 09:00

## 2017-09-20 RX ADMIN — ASPIRIN 81 MG: 81 TABLET, COATED ORAL at 06:01

## 2017-09-20 RX ADMIN — PANTOPRAZOLE SODIUM 40 MG: 40 TABLET, DELAYED RELEASE ORAL at 06:01

## 2017-09-20 RX ADMIN — FLUOXETINE HYDROCHLORIDE 20 MG: 20 CAPSULE ORAL at 09:33

## 2017-09-20 NOTE — DISCHARGE SUMMARY
HOSPITAL MEDICINE DISCHARGE SUMMARY    Date of Admission: 9/18/2017  Date of Discharge:  9/20/2017    Discharge Diagnoses:  Active Hospital Problems (** Indicates Principal Problem)    Diagnosis Date Noted   • **Renal insufficiency [N28.9] 09/19/2017   • UTI (urinary tract infection) [N39.0] 09/20/2017   • Pulmonary infarct [I26.99] 09/19/2017   • Coronary artery disease [I25.10] 09/18/2017   • Myocardial infarction [I21.3] 09/18/2017   • Pulmonary embolism [I26.99] 09/18/2017   • Shortness of breath [R06.02] 09/18/2017   • Dyslipidemia [E78.5]    • Hypertension [I10]    • PFO (patent foramen ovale) [Q21.1]      PFO versus small ASD (echocardiogram on 05/06/2011) - asymptomatic.       • Benign essential HTN [I10] 07/10/2016      Resolved Hospital Problems    Diagnosis Date Noted Date Resolved   • CKD (chronic kidney disease), stage III [N18.3] 09/18/2017 09/19/2017   • Ischemic heart disease [I25.9]  09/19/2017       Consults:   Consults     Date and Time Order Name Status Description    9/19/2017 1253 Inpatient Consult to Nephrology Completed           Presenting Problem/History of Present Illness  PE (pulmonary thromboembolism) [I26.99]  Patient is a 83 y.o. female presented with weakness and abnormal renal function labs.      Discharge Day HPI: Up on edge of bed working with PT. Looks and feels much better today. Wants to go home.      Hospital Course    MIGUEL ANGEL: 83 year old female who presented from radiology after she was found to have abnormal creatinine on BMP and weakness. Patient had recently been diagnosed with PE (see below) and was sent from radiology, where she was preparing for CTA, after her creatinine elevation was noted. Upon arrival IV fluids were started and urine studies were ordered which were essentially unremarkable. Renal ultrasound was unremarkable. Nephrology was consulted, as the patient had a new patient appointment with them on Friday. Her lisinopril was held as was her HCTZ. Following  administration of IV fluids her creatinine normalized. She will remain off of HCTZ and will continue on lisinopril. Her pcp will recheck her renal function in approximately 1 week. It is likely that she has underlying mild CKD III due to long standing issues with HTN and NSAID use. She will remain on NSAIDs and this can continue to be followed by her PCP.    Acute PE, DVT with pulmonary infarct: As above patient had recently been diagnosed with PT with an intermediate V/Q scan. She underwent LE dopplers here which did show an acute DVT in her right leg. She also underwent non contrasted CT chest which showed a pulmonary infarct. She had been on Eliquis prior to her arrival and she will continue this upon d/c.    UTI (POA): Patient was started on rocephin and will continue PO ceftin x 6 more days upon d/c.    Procedures Performed     None    Consults:   Consults     Date and Time Order Name Status Description    9/19/2017 1253 Inpatient Consult to Nephrology Completed           Pertinent Test Results:   Lab Results (last 7 days)     Procedure Component Value Units Date/Time    Troponin [[id number]]  (Normal) Collected:  09/18/17 1617    Specimen:  Blood Updated:  09/18/17 1654     Troponin I 0.021 ng/mL     BNP [[id number]]  (Normal) Collected:  09/18/17 1552    Specimen:  Blood Updated:  09/18/17 1659     BNP 16.0 pg/mL     CBC (No Diff) [[id number]]  (Abnormal) Collected:  09/19/17 0458    Specimen:  Blood Updated:  09/19/17 0614     WBC 11.40 (H) 10*3/mm3      RBC 4.00 10*6/mm3      Hemoglobin 10.9 (L) g/dL      Hematocrit 36.7 %      MCV 91.8 fL      MCH 27.3 pg      MCHC 29.7 (L) g/dL      RDW 15.9 (H) %      RDW-SD 53.5 fl      MPV 10.1 fL      Platelets 283 10*3/mm3     Basic Metabolic Panel [[id number]]  (Abnormal) Collected:  09/19/17 0458    Specimen:  Blood Updated:  09/19/17 0636     Glucose 103 (H) mg/dL      BUN 38 (H) mg/dL      Creatinine 1.70 (H) mg/dL      Sodium 138 mmol/L      Potassium 3.9 mmol/L       Chloride 104 mmol/L      CO2 25.0 mmol/L      Calcium 8.7 mg/dL      eGFR Non African Amer 29 (L) mL/min/1.73      BUN/Creatinine Ratio 22.4     Anion Gap 9.0 mmol/L     Narrative:       National Kidney Foundation Guidelines    Stage     Description        GFR  1         Normal or High     90+  2         Mild decrease      60-89  3         Moderate decrease  30-59  4         Severe decrease    15-29  5         Kidney failure     <15    Protein Elec + Interp, Serum [[id number]] Collected:  09/19/17 1028    Specimen:  Blood Updated:  09/19/17 1043    Protein Electrophoresis, 24 Hr Urine [[id number]] Collected:  09/19/17 1432    Specimen:  Urine from Urine, Clean Catch Updated:  09/19/17 1432    Urinalysis With / Microscopic If Indicated [[id number]]  (Abnormal) Collected:  09/19/17 1432    Specimen:  Urine from Urine, Catheter Updated:  09/19/17 1512     Color, UA Yellow     Appearance, UA Cloudy (A)     pH, UA <=5.0     Specific Gravity, UA 1.019     Glucose, UA Negative     Ketones, UA Negative     Bilirubin, UA Negative     Blood, UA Negative     Protein, UA Negative     Leuk Esterase, UA Moderate (2+) (A)     Nitrite, UA Positive (A)     Urobilinogen, UA 0.2 E.U./dL    Urinalysis, Microscopic Only [[id number]]  (Abnormal) Collected:  09/19/17 1432    Specimen:  Urine from Urine, Catheter Updated:  09/19/17 1512     RBC, UA 3-6 (A) /HPF      WBC, UA 21-30 (A) /HPF      Bacteria, UA 3+ (A) /HPF      Squamous Epithelial Cells, UA 0-2 /HPF      Hyaline Casts, UA 0-6 /LPF      Methodology Automated Microscopy    Eosinophil Smear [[id number]]  (Normal) Collected:  09/19/17 1432    Specimen:  Urine from Urine, Clean Catch Updated:  09/19/17 1712     Eosinophil Smear 0 % EOS/100 Cells     Narrative:       No eosinophil seen    Sodium, Urine, Random [[id number]]  (Normal) Collected:  09/19/17 1432    Specimen:  Urine from Urine, Clean Catch Updated:  09/19/17 1723     Sodium, Urine 39 mmol/L     Creatinine, Urine, Random  "[[id number]] Collected:  09/19/17 1432    Specimen:  Urine from Urine, Clean Catch Updated:  09/19/17 1723     Creatinine, Urine 102.4 mg/dL     Urea Nitrogen, Urine [[id number]] Collected:  09/19/17 1432    Specimen:  Urine from Urine, Clean Catch Updated:  09/19/17 1723     Urea Nitrogen, Urine 603 mg/dL     Protein Electrophoresis, Total [[id number]] Collected:  09/20/17 0639    Specimen:  Blood Updated:  09/20/17 0709    Basic Metabolic Panel [[id number]]  (Abnormal) Collected:  09/20/17 0639    Specimen:  Blood Updated:  09/20/17 0735     Glucose 109 (H) mg/dL      BUN 31 (H) mg/dL      Creatinine 1.00 mg/dL      Sodium 139 mmol/L      Potassium 3.9 mmol/L      Chloride 108 mmol/L      CO2 26.0 mmol/L      Calcium 8.6 (L) mg/dL      eGFR Non African Amer 53 (L) mL/min/1.73      BUN/Creatinine Ratio 31.0 (H)     Anion Gap 5.0 mmol/L     Narrative:       National Kidney Foundation Guidelines    Stage     Description        GFR  1         Normal or High     90+  2         Mild decrease      60-89  3         Moderate decrease  30-59  4         Severe decrease    15-29  5         Kidney failure     <15        Imaging Results (all)     Procedure Component Value Units Date/Time    CT Chest Without Contrast [[id number]] Collected:  09/18/17 1633     Updated:  09/18/17 2156    Narrative:       EXAMINATION: CT CHEST WO CONTRAST-09/18/2017:      INDICATION: Right lung 3 cm \"mass\" in the setting of possible PE (see VQ  report) versus neoplasm versus PNA.         TECHNIQUE: 5 mm unenhanced images through the chest and upper abdomen.     The radiation dose reduction device was turned on for each scan per the  ALARA (As Low as Reasonably Achievable) protocol.     COMPARISON: 09/18/2017 chest CT scan, and portable chest radiograph of  today's date as well.     FINDINGS: The previously noted right mid lung density is a wedge shaped  pleural-based 2.2 cm lesion in the superior segment of the right lower  lobe, which could represent a " focus of pneumonia, but is also  approximately the expected shape of a pulmonary infarct. There is no  evidence of potential infarct elsewhere, no evidence of pulmonary edema,  or evidence of significant effusion. There is mild coarsening of the  interstitial lung markings and a few granulomatous calcifications. The  lungs otherwise appear clear.     Mediastinal window images show no pericardial effusion. There is a left  coronary artery stent. No adenopathy is seen. The pulmonary arteries are  enlarged, to approximately 3.6 cm at the level of the main pulmonary  artery, 2.9 cm for the right pulmonary artery and 3.2 cm for the left  pulmonary artery.     Included images of the upper abdomen show no significant abnormalities  of the visualized portions of the spleen, pancreas, adrenal glands, or  upper renal poles. Clips are seen in the gallbladder fossa. The liver  appears grossly normal. A single bubble of air is seen in the proximal  common hepatic duct or distal right lobe biliary radical, presumably as  a result of previous cholecystectomy and perhaps sphincterotomy.       Impression:       1. Wedge-shaped 2.2 cm pleural-based opacity in the right lower lobe  superior segment which corresponds to the patient's chest x-ray  abnormality. There are no particular features of neoplasm. Differential  includes pneumonia, postinflammatory scar, or pulmonary infarct.  2. Diffuse ectasia of the main pulmonary arteries, suggesting underlying  pulmonary arterial hypertension may be present.     D:  09/18/2017  E:  09/18/2017           This report was finalized on 9/18/2017 9:54 PM by DR. Lc Ge MD.       US Renal Limited [[id number]] Collected:  09/19/17 1248     Updated:  09/19/17 1436    Narrative:       EXAMINATION: US RENAL, LIMITED-09/19/2017:     INDICATION: MIGUEL ANGEL.      TECHNIQUE: Ultrasound of kidneys and urinary bladder utilizing Doppler  and grayscale imaging techniques.     COMPARISON: NONE.     FINDINGS: Right  "kidney measures 9.3 cm in length containing multiple  subcentimeter okbkemuiks-qd-tzdoksyh structures without evidence of  complex features consistent with renal cortical cyst. No hydronephrosis  or obvious calculi. Left kidney measures 8.6 cm in length containing  multiple primarily subcentimeter ssnrukliyj-kl-jajpdoln lesions, the  largest of which measures 1.1 cm, consistent with renal cortical cysts.  No evidence for complex features. No hydronephrosis or obvious calculi.     Urinary bladder is partially distended and unremarkable.       Impression:       1. No sonographic evidence for abnormality within the visualized kidneys  or urinary bladder. Specifically, no evidence for hydronephrosis.  2. Multiple hwzxtnilxz-df-cddvgwsf predominantly subcentimeter cystic  lesions within the bilateral kidneys consistent with renal cortical  cysts. No complex features are identified.     D:  09/19/2017  E:  09/19/2017            This report was finalized on 9/19/2017 2:34 PM by Dr. Christiano Cabrera.               Physical Exam on Discharge:    /86 (BP Location: Left arm, Patient Position: Lying)  Pulse 92  Temp 98.2 °F (36.8 °C) (Oral)   Resp 18  Ht 60\" (152.4 cm)  Wt 208 lb (94.3 kg)  SpO2 93%  BMI 40.62 kg/m2  Gen-no acute distress, looks much better  CV-RRR, S1 S2 normal, no m/r/g  Resp-CTAB, no wheezes  Abd-soft, NT, ND, +BS  Ext-no edema  Neuro-A&Ox3, no focal deficits  Psych-appropriate mood      Discharge Disposition  Home or Self Care    Discharge Medications   Iliana Oleary   Home Medication Instructions ANTONIO:[id number]    Printed on:09/20/17 1103   Medication Information                      acetaminophen (TYLENOL) 325 MG tablet  Take 2 tablets by mouth Every 6 (Six) Hours As Needed for Mild Pain .             apixaban (ELIQUIS) 5 MG tablet tablet  Take 5 mg by mouth 2 (Two) Times a Day.             aspirin 81 MG EC tablet  Take 81 mg by mouth every morning.             atorvastatin (LIPITOR) 40 MG " tablet  Take 40 mg by mouth daily.             Budesonide (ENTOCORT EC) 3 MG 24 hr capsule  Take 3 mg by mouth 4 (Four) Times a Week.             cefuroxime (CEFTIN) 250 MG tablet  Take 1 tablet by mouth 2 (Two) Times a Day for 6 days.             diphenhydrAMINE-acetaminophen (TYLENOL PM)  MG tablet per tablet  Take 1 tablet by mouth every night.             FLUoxetine (PROzac) 20 MG capsule  Take 20 mg by mouth daily.             gabapentin (NEURONTIN) 100 MG capsule  Take 300 mg by mouth Every Morning. 200mg every evening              HYDROcodone-acetaminophen (NORCO) 5-325 MG per tablet  Take 2 tablets by mouth 3 (Three) Times a Day.             lansoprazole (PREVACID) 30 MG capsule  Take 30 mg by mouth daily.             lisinopril (PRINIVIL,ZESTRIL) 10 MG tablet  Take 1 tablet by mouth Daily.             Multiple Vitamins-Minerals (MULTIVITAMIN PO)  Take 1 tablet by mouth daily.             polyethylene glycol (MIRALAX) packet  Take 17 g by mouth Daily.                 Discharge Diet: Cardiac      Activity at Discharge: As tolerated      Follow-up Appointments  Future Appointments  Date Time Provider Department Center   10/4/2017 10:30 AM [facility] ADMIN RM  [facility]   10/4/2017 11:00 AM [facility] SCAN ROOM  [facility]   10/4/2017 11:30 AM [facility] STRESS LAB 2  [facility]   10/4/2017 12:00 PM [facility] SCAN ROOM  [facility]   3/7/2018 9:30 AM Tracy Diego MD [facility] None     Additional Instructions for the Follow-ups that You Need to Schedule     Ambulatory Referral to Home Health    As directed    Face to Face Visit Date:  9/20/2017   Follow-up Provider for Plan of Care?:  I treated the patient in an acute care facility and will not continue treatment after discharge.   Follow-up Provider:  KAROLYN RODRIGUEZ   Reason/Clinical Findings:  Impaired functional mobility, balance, gait, and endurance, impaired ability to complete ADL's safely    Describe mobility limitations that make leaving home difficult:  Moderate to severe weakness, impaired gait, mobility and endurance   Nursing/Therapeutic Services Requested:   Physical Therapy Comment - Nurse aide to assist with bathing on occasion   Occupational Therapy      PT orders:   Total joint pathway  Therapeutic exercise  Gait Training  Transfer training  Strengthening  Home safety assessment      Weight Bearing Status:  As Tolerated   Occupational orders:   Activities of daily living  Energy conservation  Strengthening  Cognition  Fine motor  Home safety assessment          Discharge Follow-up with PCP    As directed    Follow Up Details:  1 week hospital follow up       Discharge Follow-up with Specified Provider    As directed    To:  GI - Dr. Graves   Follow Up:  1 Month   Follow Up Details:  New patient appointment for early satiety, poor appetite, weight loss       Basic Metabolic Panel    Sep 25, 2017 (Approximate)                  Test Results Pending at Discharge   Order Current Status    Protein Elec + Interp, Serum In process    Protein Electrophoresis, 24 Hr Urine In process    Protein Electrophoresis, Total In process          Time: Discharge 40 min

## 2017-09-20 NOTE — PLAN OF CARE
Problem: Patient Care Overview (Adult)  Goal: Adult Individualization and Mutuality  Outcome: Ongoing (interventions implemented as appropriate)  Goal: Discharge Needs Assessment  Outcome: Ongoing (interventions implemented as appropriate)    Problem: Activity Intolerance (Adult)  Goal: Identify Related Risk Factors and Signs and Symptoms  Outcome: Ongoing (interventions implemented as appropriate)  Goal: Activity Tolerance  Outcome: Ongoing (interventions implemented as appropriate)  Goal: Effective Energy Conservation Techniques  Outcome: Ongoing (interventions implemented as appropriate)    Problem: Fall Risk (Adult)  Goal: Identify Related Risk Factors and Signs and Symptoms  Outcome: Ongoing (interventions implemented as appropriate)  Goal: Absence of Falls  Outcome: Ongoing (interventions implemented as appropriate)    Problem: Skin Integrity Impairment, Risk/Actual (Adult)  Goal: Identify Related Risk Factors and Signs and Symptoms  Outcome: Ongoing (interventions implemented as appropriate)  Goal: Skin Integrity/Wound Healing  Outcome: Ongoing (interventions implemented as appropriate)    Problem: Renal Failure/Kidney Injury, Acute (Adult)  Goal: Signs and Symptoms of Listed Potential Problems Will be Absent or Manageable (Renal Failure/Kidney Injury, Acute)  Outcome: Ongoing (interventions implemented as appropriate)

## 2017-09-20 NOTE — PLAN OF CARE
Problem: Patient Care Overview (Adult)  Goal: Plan of Care Review  Outcome: Ongoing (interventions implemented as appropriate)    09/20/17 0518   Coping/Psychosocial Response Interventions   Plan Of Care Reviewed With patient   Patient Care Overview   Progress progress toward functional goals as expected   Outcome Evaluation   Outcome Summary/Follow up Plan Pt has had no c/o pain. Continues to experience frequent confusion and disorientation. Waffle mattress placed this evening.

## 2017-09-20 NOTE — THERAPY DISCHARGE NOTE
Acute Care - Occupational Therapy Initial Eval/Discharge  [facility]     Patient Name: Iliana Oleary  : 1934  MRN: [id number]  Today's Date: 2017  Onset of Illness/Injury or Date of Surgery Date: 17  Date of Referral to OT: 17  Referring Physician: DO Fabiola      Admit Date: 2017       ICD-10-CM ICD-9-CM   1. Impaired mobility and ADLs Z74.09 799.89   2. Impaired functional mobility, balance, gait, and endurance Z74.09 V49.89     Patient Active Problem List   Diagnosis   • Benign essential HTN   • Hypertension   • Dyslipidemia   • PFO (patent foramen ovale)   • Morbid obesity   • Osteoarthritis   • Coronary artery disease   • Myocardial infarction   • Pulmonary embolism   • Shortness of breath   • Renal insufficiency   • Pulmonary infarct     Past Medical History:   Diagnosis Date   • Anxiety    • Arthritis    • Constipation    • Coronary artery disease    • Depression    • Dyslipidemia    • Hypertension    • IBS (irritable bowel syndrome)    • Ischemic heart disease    • Morbid obesity     ; BMI > 40.     • Myocardial infarction    • Osteoarthritis    • PFO (patent foramen ovale)     PFO versus small ASD (echocardiogram on 2011) - asymptomatic.       Past Surgical History:   Procedure Laterality Date   • ANAL FISSURECTOMY/FISTULECTOMY  10/2002   • ANAL FISTULA REPAIR     • ANKLE SURGERY Left    • ANKLE SURGERY Left 2001    ANKLE SCREW AND BONE CHIP REMOVAL    • CATARACT EXTRACTION     • CHOLECYSTECTOMY  2000   • FOOT SURGERY  1988    RIGHT HEEL SPUR    • HEMORRHOIDECTOMY     • KNEE ARTHROPLASTY Left 10/2007   • OTHER SURGICAL HISTORY Right 2005    Flank abcess (staph)          OT ASSESSMENT FLOWSHEET (last 72 hours)      OT Evaluation       17 0936 17 0835 17 1052 17 1043 17 1700    Rehab Evaluation    Document Type  discharge evaluation/summary  -AC       Subjective Information  agree to therapy;complains of;weakness;fatigue   -       Patient Effort, Rehab Treatment  good  -       General Information    Patient Profile Review  yes  -       Onset of Illness/Injury or Date of Surgery Date  09/18/17  -       Referring Physician  DO Fabiola  -       General Observations  Pt received supine in bed.   -       Pertinent History Of Current Problem  Pt admit with SOA; PE, RLE DVT  -       Precautions/Limitations  fall precautions  -       Prior Level of Function  independent:;all household mobility;ADL's  -       Equipment Currently Used at Home  bath bench;grab bar;lift device;walker, rolling   does not use bath bench, lift chair to main floor  - walker, rolling;bath bench;lift device;grab bar  -      Plans/Goals Discussed With  patient;spouse/S.O.;agreed upon  -       Risks Reviewed  patient and family:;LOB;change in vital signs  -       Benefits Reviewed  patient and family:;improve function;increase independence;increase strength;increase balance;increase knowledge  -       Barriers to Rehab  hearing deficit   mild cognitive delay  -       Living Environment    Lives With  spouse  - spouse  -  spouse  -    Living Arrangements  [place]  - house  -[facility]  -    Home Accessibility  stairs to enter home;tub/shower is not walk in;stairs within home   split level; chair lift to main floor  - no concerns  -  no concerns  -    Number of Stairs to Enter Home  2  -       Number of Stairs Within Home  14   7 up and 7 down; goes to main floor with chair lift  -       Stair Railings at Home   outside, present on left side  -  outside, present on left side  -    Type of Financial/Environmental Concern   none  -  none  -    Transportation Available   car;family or friend will provide  -  car;family or friend will provide  -    Living Environment Comment     NONE  -    Clinical Impression    Date of Referral to OT  09/19/17  -       OT Diagnosis  ADL decline  -AC       Patient/Family Goals  Statement  Pt would like to rerturn to PLOF with self care  -AC       Criteria for Skilled Therapeutic Interventions Met  yes;treatment indicated  -AC       Rehab Potential  good, to achieve stated therapy goals  -AC       Therapy Frequency  daily  -AC       Anticipated Discharge Disposition home with assist;home with home health  -AC home with assist;home with home health  -AC       Functional Level Prior    Ambulation    0-->independent  -AW     Transferring    0-->independent  -AW     Toileting    0-->independent  -AW     Bathing    0-->independent  -AW     Dressing    0-->independent  -AW     Eating    0-->independent  -AW     Communication    0-->understands/communicates without difficulty  -AW     Swallowing    0-->swallows foods/liquids without difficulty  -AW     Vital Signs    Pre Systolic BP Rehab  119  -AC       Pre Treatment Diastolic BP  86  -AC       Intra Treatment Diastolic BP  127  -AC       Post Systolic BP Rehab  90  -AC       Pretreatment Heart Rate (beats/min)  91  -AC       Intratreatment Heart Rate (beats/min)  103  -AC       Posttreatment Heart Rate (beats/min)  87  -AC       Pre SpO2 (%)  92  -AC       O2 Delivery Pre Treatment  room air  -AC       Intra SpO2 (%)  89  -AC       O2 Delivery Intra Treatment  room air  -AC       Post SpO2 (%)  91  -AC       O2 Delivery Post Treatment  room air  -AC       Pain Assessment    Pain Assessment  No/denies pain  -AC       Vision Assessment/Intervention    Visual Impairment  WFL with corrective lenses  -       Cognitive Assessment/Intervention    Current Cognitive/Communication Assessment  --   mild cognitive delay  -AC       Orientation Status  oriented x 4   delay with month and year  -AC       Follows Commands/Answers Questions  100% of the time;needs cueing;needs repetition  -AC       Personal Safety  mild impairment  -AC       Personal Safety Interventions  fall prevention program maintained;gait belt;nonskid shoes/slippers when out of bed  -AC        ROM (Range of Motion)    General ROM  no range of motion deficits identified  -       MMT (Manual Muscle Testing)    General MMT Assessment  upper extremity strength deficits identified  -       General MMT Assessment Detail  BUE grossly 4-/5  -AC       Bed Mobility, Assessment/Treatment    Bed Mob, Supine to Sit, [place]  verbal cues required;minimum assist (75% patient effort)  -       Bed Mob, Sit to Supine, [place]  verbal cues required;minimum assist (75% patient effort)   asssit with legs  -       Transfer Assessment/Treatment    Transfers, Sit-Stand [place]  verbal cues required;2 person assist required;contact guard assist  -AC       Transfers, Stand-Sit [place]  verbal cues required;contact guard assist;2 person assist required  -AC       Transfers, Sit-Stand-Sit, Assist Device  rolling walker  -       Toilet Transfer, [place]  verbal cues required;minimum assist (75% patient effort)  -       Toilet Transfer, Assistive Device  bedside commode without drop arms  -       Transfer, Comment  Vcs for hand placement  -       Functional Mobility    Functional Mobility- Ind. Level  verbal cues required;contact guard assist;2 person assist required  -       Functional Mobility- Device  rolling walker  -       Functional Mobility-Distance (Feet)  40  -       Functional Mobility- Comment  Vcs for upright posture and tokeep walker closer  -       Toileting Assessment/Training    Toileting Assess/Train, Assistive Device  bedside commode  -       Toileting Assess/Train, Position  sitting;standing  -       Toileting Assess/Train, Indepen Level  contact guard assist;set up required  -       General Therapy Interventions    Planned Therapy Interventions  ADL retraining;balance training;bed mobility training;transfer training;strengthening  -       Positioning and Restraints    Pre-Treatment Position  in bed  -       Post Treatment Position  bed  -AC       In  Bed  call light within reach;encouraged to call for assist;supine;exit alarm on;with family/caregiver  -         09/18/17 1600                General Information    Equipment Currently Used at Home bath bench  -JH        Functional Level Prior    Ambulation 0-->independent  -JH        Transferring 0-->independent  -JH        Toileting 0-->independent  -JH        Bathing 0-->independent  -JH        Dressing 0-->independent  -JH        Eating 0-->independent  -JH        Communication 0-->understands/communicates without difficulty  -JH        Swallowing 0-->swallows foods/liquids without difficulty  -JH          User Key  (r) = Recorded By, (t) = Taken By, (c) = Cosigned By    Initials Name Effective Dates     María Lin OT 06/23/15 -     STACEY Blank RN 06/16/16 -     UNIQUE Neves RN 06/16/16 -           Occupational Therapy Education     Title: PT OT SLP Therapies (Active)     Topic: Occupational Therapy (Active)     Point: ADL training (Done)    Description: Instruct learner(s) on proper safety adaptation and remediation techniques during self care or transfers.   Instruct in proper use of assistive devices.    Learning Progress Summary    Learner Readiness Method Response Comment Documented by Status   Patient Acceptance E VU Benefits of activity, role of OT  09/20/17 0928 Done                      User Key     Initials Effective Dates Name Provider Type Discipline     06/23/15 -  María Lin OT Occupational Therapist OT                OT Recommendation and Plan  Anticipated Discharge Disposition: home with assist, home with home health  Planned Therapy Interventions: ADL retraining, balance training, bed mobility training, transfer training, strengthening  Therapy Frequency: daily  Plan of Care Review  Plan Of Care Reviewed With: patient  Outcome Summary/Follow up Plan: OT eval complete.  Pt presents with decreased activity tolerance, strength and balance limiting independence  with ADLs and mobiltiy. Pt required min a bed mobiltiy and CGA with ambulation with O2 sat dropping to 89%.   Pt is to be discharged home today.  Recommend HHOT upon d/c.            OT Goals       09/20/17 0930          Bed Mobility OT LTG    Bed Mobility OT LTG, Date Established 09/20/17  -      Bed Mobility OT LTG, Time to Achieve by discharge  -AC      Bed Mobility OT LTG, Activity Type supine to sit/sit to supine  -      Bed Mobility OT LTG, [place] Level minimum assist (75% patient effort)  -      Bed Mobility OT LTG, Outcome goal met  -AC      Transfer Training OT LTG    Transfer Training OT LTG, Date Established 09/20/17  -      Transfer Training OT LTG, Time to Achieve by discharge  -AC      Transfer Training OT LTG, Activity Type toilet  -AC      Transfer Training OT LTG, [place] Level minimum assist (75% patient effort)  -AC      Transfer Training OT LTG, Outcome goal met  -AC      Patient Education OT LTG    Patient Education OT LTG, Date Established 09/20/17  -      Patient Education OT LTG, Time to Achieve by discharge  -AC      Patient Education OT LTG, Education Type adaptive breathing;home safety  -      Patient Education OT LTG, Education Understanding verbalizes understanding  -      Patient Education OT LTG Outcome goal met  -AC        User Key  (r) = Recorded By, (t) = Taken By, (c) = Cosigned By    Initials Name Provider Type    ОЛЬГА Lin, OT Occupational Therapist                Outcome Measures       09/20/17 0835          How much help from another is currently needed...    Putting on and taking off regular lower body clothing? 3  -AC      Bathing (including washing, rinsing, and drying) 3  -AC      Toileting (which includes using toilet bed pan or urinal) 3  -AC      Putting on and taking off regular upper body clothing 3  -AC      Taking care of personal grooming (such as brushing teeth) 4  -AC      Eating meals 4  -AC      Score 20  -AC      Functional  Assessment    Outcome Measure Options AM-PAC 6 Clicks Daily Activity (OT)  -AC        User Key  (r) = Recorded By, (t) = Taken By, (c) = Cosigned By    Initials Name Provider Type    ОЛЬГА Lin OT Occupational Therapist          Time Calculation:         Time Calculation- OT       09/20/17 0937          Time Calculation- OT    OT Start Time 0835  -      Total Timed Code Minutes- OT 0 minute(s)  -AC      OT Received On 09/20/17  -        User Key  (r) = Recorded By, (t) = Taken By, (c) = Cosigned By    Initials Name Provider Type     María Lin OT Occupational Therapist          Therapy Charges for Today     Code Description Service Date Service Provider Modifiers Qty    97642649079 HC OT SELFCARE CURRENT 9/20/2017 María Lin OT GO, CJ 1    34423318772 HC OT SELFCARE PROJECTED 9/20/2017 María Lin OT GO, CJ 1    97340242035 HC OT SELFCARE DISCHARGE 9/20/2017 María Lin OT GO, CJ 1    [id number]  OT EVAL LOW COMPLEXITY 4 9/20/2017 María Lin OT GO 1          OT G-codes  Functional Assessment Tool Used: Impact 6 clicks  Score: 20  Functional Limitation: Self care  Self Care Current Status (): At least 20 percent but less than 40 percent impaired, limited or restricted  Self Care Goal Status (): At least 20 percent but less than 40 percent impaired, limited or restricted  Self Care Discharge Status (): At least 20 percent but less than 40 percent impaired, limited or restricted    OT Discharge Summary  Anticipated Discharge Disposition: home with assist, home with home health  Reason for Discharge: All goals achieved, Discharge from facility  Outcomes Achieved: Other (OT eval complete)  Discharge Destination: Home with assist, Home with home health    María Lin OT  9/20/2017

## 2017-09-20 NOTE — THERAPY DISCHARGE NOTE
Acute Care - Physical Therapy Initial Eval/Discharge  [facility]     Patient Name: Iliana Oleary  : 1934  MRN: [id number]  Today's Date: 2017   Onset of Illness/Injury or Date of Surgery Date: 17  Date of Referral to PT: 17  Referring Physician: DO Fabiola      Admit Date: 2017    Visit Dx:    ICD-10-CM ICD-9-CM   1. Impaired mobility and ADLs Z74.09 799.89   2. Impaired functional mobility, balance, gait, and endurance Z74.09 V49.89     Patient Active Problem List   Diagnosis   • Benign essential HTN   • Hypertension   • Dyslipidemia   • PFO (patent foramen ovale)   • Morbid obesity   • Osteoarthritis   • Coronary artery disease   • Myocardial infarction   • Pulmonary embolism   • Shortness of breath   • Renal insufficiency   • Pulmonary infarct     Past Medical History:   Diagnosis Date   • Anxiety    • Arthritis    • Constipation    • Coronary artery disease    • Depression    • Dyslipidemia    • Hypertension    • IBS (irritable bowel syndrome)    • Ischemic heart disease    • Morbid obesity     ; BMI > 40.     • Myocardial infarction    • Osteoarthritis    • PFO (patent foramen ovale)     PFO versus small ASD (echocardiogram on 2011) - asymptomatic.       Past Surgical History:   Procedure Laterality Date   • ANAL FISSURECTOMY/FISTULECTOMY  10/2002   • ANAL FISTULA REPAIR     • ANKLE SURGERY Left    • ANKLE SURGERY Left 2001    ANKLE SCREW AND BONE CHIP REMOVAL    • CATARACT EXTRACTION     • CHOLECYSTECTOMY  2000   • FOOT SURGERY  1988    RIGHT HEEL SPUR    • HEMORRHOIDECTOMY     • KNEE ARTHROPLASTY Left 10/2007   • OTHER SURGICAL HISTORY Right 2005    Flank abcess (staph)          PT ASSESSMENT (last 72 hours)      PT Evaluation       17 0835 17 0830    Rehab Evaluation    Document Type discharge evaluation/summary  -AC discharge evaluation/summary  -DM    Subjective Information agree to therapy;complains of;weakness;fatigue  -AC agree to  therapy;complains of;weakness;dyspnea;swelling  -DM    Patient Effort, Rehab Treatment good  -AC good  -DM    Symptoms Noted During/After Treatment  fatigue  -DM    General Information    Patient Profile Review yes  -AC other (see comments)  -DM    Onset of Illness/Injury or Date of Surgery Date 09/18/17  -AC 09/18/17  -DM    Referring Physician DO Fabiola  -ОЛЬГА No DO  -PHILIPPE    General Observations Pt received supine in bed.   -AC SUP. in bed;tele,iv; RA;waff jasmine.;redness/swelling R antecubital fossa (warm to touch);SPOUSE present  -DM    Pertinent History Of Current Problem Pt admit with SOA; PE, RLE DVT  -AC to [facility] 9-13 W/ DYSPNEA, WEAKNESS, somnulence, chr. LE edema (R > L),reported fall in BR 9-12;  on 9-15, MD placed on Eliquis for ?PE; to [facility] ER 9-18: VENOUS DUPLEX RLE: DVT's in comm.fem, prox fem & peroneal; VQ scan: probab P.E.; CT chest: R lung mass, ? PE   -DM    Precautions/Limitations fall precautions  -AC fall precautions  -DM    Prior Level of Function independent:;all household mobility;ADL's  -AC independent:;all household mobility;ADL's;dependent:;home management;cooking;cleaning;driving;shopping;using stairs   indep gt w/ R wx;spouse cooks,freqBrings meal to her in bed   -DM    Equipment Currently Used at Home bath bench;grab bar;lift device;walker, rolling   does not use bath bench, lift chair to main floor  -AC walker, rolling;bath bench;grab bar;lift device   has lift chair to main floor; doesn't use bath bench  -DM    Plans/Goals Discussed With patient;spouse/S.O.;agreed upon  -AC patient;spouse/S.O.;agreed upon  -DM    Risks Reviewed patient and family:;LOB;change in vital signs  -AC patient:;spouse/S.O.:;LOB;increased discomfort;change in vital signs;lines disloged  -DM    Benefits Reviewed patient and family:;improve function;increase independence;increase strength;increase balance;increase knowledge  -AC patient:;spouse/S.O.:;improve function;increase independence;increase  strength;increase balance;increase knowledge  -DM    Barriers to Rehab hearing deficit   mild cognitive delay  -AC hearing deficit;cognitive status   per spouse, memory issues/mild cognit. delay  -DM    Living Environment    Lives With spouse  -AC spouse  -DM    Living Arrangements house  -AC house  -DM    Home Accessibility stairs to enter home;tub/shower is not walk in;stairs within home   split level; chair lift to main floor  -AC stairs to enter home;stairs within home;tub/shower is not walk in;bed and bath are not on the first floor   split level; chair lift to upstairs bed/bath;w/o useLowerLev  -DM    Number of Stairs to Enter Home 2  -AC 2   1 + 1  -DM    Number of Stairs Within Home 14   7 up and 7 down; goes to main floor with chair lift  -AC 14    7+ 7 (split level)  -DM    Stair Railings at Home  outside, present on left side  -DM    Type of Financial/Environmental Concern  none  -DM    Transportation Available  car;family or friend will provide  -DM    Clinical Impression    Date of Referral to PT  09/19/17  -DM    PT Diagnosis  impaired funct mobil  -DM    Criteria for Skilled Therapeutic Interventions Met  --   eval only  -DM    Pathology/Pathophysiology Noted (Describe Specifically for Each System)  pulmonary  -DM    Impairments Found (describe specific impairments)  gait, locomotion, and balance  -DM    Rehab Potential  good, to achieve stated therapy goals  -DM    Vital Signs    Pre Systolic BP Rehab 119  -  -DM    Pre Treatment Diastolic BP 86  -AC 86  -DM    Intra Treatment Diastolic   -AC     Post Systolic BP Rehab 90  -  -DM    Post Treatment Diastolic BP  90  -DM    Pretreatment Heart Rate (beats/min) 91  -AC 93  -DM    Intratreatment Heart Rate (beats/min) 103  -  -DM    Posttreatment Heart Rate (beats/min) 87  -AC 99  -DM    Pre SpO2 (%) 92  -AC 92  -DM    O2 Delivery Pre Treatment room air  -AC room air  -DM    Intra SpO2 (%) 89  -AC 89  -DM    O2 Delivery Intra  Treatment room air  -AC room air  -DM    Post SpO2 (%) 91  -AC 92  -DM    O2 Delivery Post Treatment room air  -AC room air  -DM    Pre Patient Position  Supine  -DM    Intra Patient Position  Standing  -DM    Post Patient Position  Supine  -DM    Pain Assessment    Pain Assessment No/denies pain  -AC No/denies pain  -DM    Vision Assessment/Intervention    Visual Impairment WFL with corrective lenses  -AC WFL with corrective lenses  -DM    Cognitive Assessment/Intervention    Current Cognitive/Communication Assessment --   mild cognitive delay  -AC impaired   cognitive delay (spouse acknowledges memory problems)  -DM    Orientation Status oriented x 4   delay with month and year  -AC oriented x 4;required verbal cueing (specifiy in comments)   needs cueing(delay on mo./yr);also hearing deficit  -DM    Follows Commands/Answers Questions 100% of the time;needs cueing;needs repetition  -% of the time;able to follow single-step instructions;needs cueing;needs increased time;needs repetition  -DM    Personal Safety mild impairment  -AC mild impairment;decreased awareness, need for assist;decreased awareness, need for safety;decreased insight to deficits  -DM    Personal Safety Interventions fall prevention program maintained;gait belt;nonskid shoes/slippers when out of bed  -AC fall prevention program maintained;gait belt;nonskid shoes/slippers when out of bed  -DM    ROM (Range of Motion)    General ROM no range of motion deficits identified  -AC lower extremity range of motion deficits identified   B knee flex 25% limitation d/t tissue approx.  -DM    MMT (Manual Muscle Testing)    General MMT Assessment upper extremity strength deficits identified  -AC lower extremity strength deficits identified   B hip flex & quads 4-//5;B hamstrings 3 +/5  -DM    General MMT Assessment Detail BUE grossly 4-/5  -AC     Bed Mobility, Assessment/Treatment    Bed Mobility, Assistive Device  bed rails  -DM    Bed Mob, Supine to  Sit, [place] verbal cues required;minimum assist (75% patient effort)  -AC minimum assist (75% patient effort);verbal cues required   slow transit. mvmt  -DM    Bed Mob, Sit to Supine, [place] verbal cues required;minimum assist (75% patient effort)   asssit with legs  -AC minimum assist (75% patient effort);verbal cues required   asst. w/ LE's  -DM    Bed Mobility, Safety Issues  decreased use of arms for pushing/pulling;decreased use of legs for bridging/pushing  -DM    Bed Mobility, Impairments  strength decreased;impaired balance   LOB post. x 2  -DM    Bed Mobility, Comment  cues for HP  -DM    Transfer Assessment/Treatment    Transfers, Sit-Stand [place] verbal cues required;2 person assist required;contact guard assist  -AC contact guard assist;2 person assist required;verbal cues required  -DM    Transfers, Stand-Sit [place] verbal cues required;contact guard assist;2 person assist required  -AC contact guard assist;2 person assist required;verbal cues required  -DM    Transfers, Sit-Stand-Sit, Assist Device rolling walker  -AC rolling walker  -DM    Toilet Transfer, [place] verbal cues required;minimum assist (75% patient effort)  -AC minimum assist (75% patient effort);verbal cues required  -DM    Toilet Transfer, Assistive Device bedside commode without drop arms  -AC bedside commode without drop arms  -DM    Transfer, Safety Issues  weight-shifting ability decreased  -DM    Transfer, Impairments  strength decreased;impaired balance  -DM    Transfer, Comment Vcs for hand placement  -AC CUES for HP,shifting over C of G  -DM    Gait Assessment/Treatment    Gait, [place] Level  contact guard assist;2 person assist required;verbal cues required  -DM    Gait, Assistive Device  rolling walker  -DM    Gait, Distance (Feet)  40   1 stand.rest midway;o2 sat. 93 %,then 96 % w/ PLB;c/o fat.  -DM    Gait, Gait Pattern Analysis  swing-to gait  -DM    Gait, Gait Deviations  bayron  decreased;decreased heel strike;forward flexed posture;step length decreased;weight-shifting ability decreased  -DM    Gait, Safety Issues  step length decreased;weight-shifting ability decreased  -DM    Gait, Impairments  strength decreased;impaired balance  -DM    Gait, Comment  cues for incr. step length,trunk ext, PLB  -DM    Therapy Exercises    Bilateral Lower Extremities  AROM:;10 reps;sitting;ankle pumps/circles;hip flexion;LAQ;hip abduction/adduction   MD assessing/consulting w/pt. 15 min.,then ther ex resumed  -DM    Positioning and Restraints    Pre-Treatment Position in bed  -AC in bed  -DM    Post Treatment Position bed  -AC bed  -DM    In Bed call light within reach;encouraged to call for assist;supine;exit alarm on;with family/caregiver  -AC notified nsg;fowlers;call light within reach;encouraged to call for assist;with family/caregiver   ret. to iinstruct in HEP  -DM      09/19/17 1052 09/18/17 1700    General Information    Equipment Currently Used at Home walker, rolling;bath bench;lift device;grab bar  -     Living Environment    Lives With spouse  -AW spouse  -    Living Arrangements house  - house  -    Home Accessibility no concerns  - no concerns  -    Stair Railings at Home outside, present on left side  -AW outside, present on left side  -    Type of Financial/Environmental Concern none  - none  -    Transportation Available car;family or friend will provide  - car;family or friend will provide  -    Living Environment Comment  NONE  -      09/18/17 1600       General Information    Equipment Currently Used at Home bath bench  -       User Key  (r) = Recorded By, (t) = Taken By, (c) = Cosigned By    Initials Name Provider Type    AC María Lin, OT Occupational Therapist    PHILIPPE Redd, PT Physical Therapist    AW Malini Blank, RN Registered Nurse     Renée Neves, RN Registered Nurse          Physical Therapy Education     Title: PT OT SLP  Therapies (Active)     Topic: Physical Therapy (Active)     Point: Mobility training (Active)    Learning Progress Summary    Learner Readiness Method Response Comment Documented by Status   Patient Eager E,D,H NR  DM 09/20/17 0952 Active   Significant Other Eager E,D,H NR  DM 09/20/17 0952 Active               Point: Home exercise program (Active)    Learning Progress Summary    Learner Readiness Method Response Comment Documented by Status   Patient Eager E,D,H NR  DM 09/20/17 0952 Active   Significant Other Eager E,D,H NR  DM 09/20/17 0952 Active               Point: Body mechanics (Active)    Learning Progress Summary    Learner Readiness Method Response Comment Documented by Status   Patient Eager E,D,H NR  DM 09/20/17 0952 Active   Significant Other Eager E,D,H NR  DM 09/20/17 0952 Active               Point: Precautions (Active)    Learning Progress Summary    Learner Readiness Method Response Comment Documented by Status   Patient Eager E,D,H NR  DM 09/20/17 0952 Active   Significant Other Eager E,D,H NR  DM 09/20/17 0952 Active                      User Key     Initials Effective Dates Name Provider Type Discipline    DM 06/19/15 -  Francisca Redd, PT Physical Therapist PT                PT Recommendation and Plan  Anticipated Discharge Disposition: home with home health  PT Frequency: evaluation only  Plan of Care Review  Plan Of Care Reviewed With: patient, spouse  Progress: progress toward functional goals as expected  Outcome Summary/Follow up Plan: Presents w/ stable clinical status ( on Eliquis for probable P.E. & DVT X 3 IN RLE); able to amb 40 ft on RA w/ R wx & CGA;o2 sat. decr. to 89%, but recovers to 92 % w/ rest; STG's met; will d/c home today w/ HHPT f/u & spouse's asst;             IP PT Goals       09/20/17 0952          Bed Mobility PT STG    Bed Mobility PT STG, Date Established 09/19/17  -DM      Bed Mobility PT STG, Time to Achieve 1 day  -DM      Bed Mobility PT STG, Activity Type  supine to sit/sit to supine  -DM      Bed Mobility PT STG, [place] Level minimum assist (75% patient effort)  -DM      Bed Mobility PT STG, Outcome goal met  -DM      Transfer Training PT STG    Transfer Training PT STG, Date Established 09/20/17  -DM      Transfer Training PT STG, Time to Achieve 1 day  -DM      Transfer Training PT STG, Activity Type sit to stand/stand to sit  -DM      Transfer Training PT STG, [place] Level contact guard assist;2 person assist required  -DM      Transfer Training PT STG, Assist Device walker, rolling  -DM      Transfer Training PT STG, Outcome goal met  -DM      Gait Training PT STG    Gait Training Goal PT STG, Date Established 09/20/17  -DM      Gait Training Goal PT STG, Time to Achieve 1 day  -DM      Gait Training Goal PT STG, [place] Level contact guard assist;2 person assist required  -DM      Gait Training Goal PT STG, Assist Device walker, rolling  -DM      Gait Training Goal PT STG, Distance to Achieve 40  -DM      Gait Training Goal PT STG, Outcome goal met  -DM      Patient Education PT STG    Patient Education PT STG, Date Established 09/20/17  -DM      Patient Education PT STG, Time to Achieve 1 day  -DM      Patient Education PT STG, Education Type written program;HEP;transfers;bed mobility;benefits of activity  -DM      Patient Education PT STG, Education Understanding demonstrate adequately;verbalize understanding  -DM      Patient Education PT STG Outcome goal met  -DM        User Key  (r) = Recorded By, (t) = Taken By, (c) = Cosigned By    Initials Name Provider Type    DM Francisca Redd, PT Physical Therapist                Outcome Measures       09/20/17 0835 09/20/17 0830       How much help from another person do you currently need...    Turning from your back to your side while in flat bed without using bedrails?  4  -DM     Moving from lying on back to sitting on the side of a flat bed without bedrails?  3  -DM     Moving to and from a bed  to a chair (including a wheelchair)?  3  -DM     Standing up from a chair using your arms (e.g., wheelchair, bedside chair)?  3  -DM     Climbing 3-5 steps with a railing?  2  -DM     To walk in hospital room?  3  -DM     AM-PAC 6 Clicks Score  18  -DM     How much help from another is currently needed...    Putting on and taking off regular lower body clothing? 3  -AC      Bathing (including washing, rinsing, and drying) 3  -AC      Toileting (which includes using toilet bed pan or urinal) 3  -AC      Putting on and taking off regular upper body clothing 3  -AC      Taking care of personal grooming (such as brushing teeth) 4  -AC      Eating meals 4  -AC      Score 20  -AC      Functional Assessment    Outcome Measure Options AM-PAC 6 Clicks Daily Activity (OT)  -AC AM-PAC 6 Clicks Basic Mobility (PT)  -DM       User Key  (r) = Recorded By, (t) = Taken By, (c) = Cosigned By    Initials Name Provider Type    ОЛЬГА Lin, OT Occupational Therapist    PHILIPPE Redd, PT Physical Therapist           Time Calculation:         PT Charges       09/20/17 0953          Time Calculation    Start Time 0830  -DM      PT Received On 09/20/17  -DM      PT Goal Re-Cert Due Date 09/30/17  -DM      Time Calculation- PT    Total Timed Code Minutes- PT 23 minute(s)  -DM        User Key  (r) = Recorded By, (t) = Taken By, (c) = Cosigned By    Initials Name Provider Type    PHILIPPE Redd, PT Physical Therapist          Therapy Charges for Today     Code Description Service Date Service Provider Modifiers Qty    43861740176 HC PT MOBILITY CURRENT 9/20/2017 Francisca Redd, PT GP, CK 1    98967968724 HC PT MOBILITY PROJECTED 9/20/2017 Francisca Redd, PT GP, CK 1    58371261489 HC PT MOBILITY DISCHARGE 9/20/2017 Francisca Redd, PT GP, CK 1    22345373854 HC PT EVAL LOW COMPLEXITY 4 9/20/2017 Francisca Redd, PT GP 1    68442945733 HC PT THER PROC EA 15 MIN 9/20/2017 Francisca Redd, PT GP 1    08916609993 HC GAIT TRAINING  EA 15 MIN 9/20/2017 Francisca Redd, PT GP 1          PT G-Codes  PT Professional Judgement Used?: Yes  Outcome Measure Options: AM-PAC 6 Clicks Basic Mobility (PT)  Score: 18  Functional Limitation: Mobility: Walking and moving around  Mobility: Walking and Moving Around Current Status (): At least 40 percent but less than 60 percent impaired, limited or restricted  Mobility: Walking and Moving Around Goal Status (): At least 40 percent but less than 60 percent impaired, limited or restricted  Mobility: Walking and Moving Around Discharge Status (): At least 40 percent but less than 60 percent impaired, limited or restricted    PT Discharge Summary  Anticipated Discharge Disposition: home with home health  Reason for Discharge: Discharge from facility  Outcomes Achieved: Able to achieve all goals within established timeline  Discharge Destination: Home with home health    Francisca Redd, PT  9/20/2017

## 2017-09-20 NOTE — PROGRESS NOTES
"   LOS: 1 day    Patient Care Team:  ALAN Marmolejo as PCP - General (Family Medicine)  Nelda Spear MD as PCP - Family Medicine    Subjective     Stable overiht.     Objective     Vital Signs:  Blood pressure 119/86, pulse 92, temperature 98.2 °F (36.8 °C), temperature source Oral, resp. rate 18, height 60\" (152.4 cm), weight 208 lb (94.3 kg), SpO2 93 %.    Flowsheet Rows         First Filed Value    Admission Height  60\" (152.4 cm) Documented at 09/18/2017 1300    Admission Weight  208 lb (94.3 kg) Documented at 09/18/2017 1300          09/19 0701 - 09/20 0700  In: 1796 [I.V.:1796]  Out: 800 [Urine:800]    Physical Exam:    General Appearance: NAD  Psych:   awake alert   CV:  RRR, +  edema  Lungs: respirations regular and unlabored,  clear to auscultation  Abdomen: not distended, bowel sounds present  :  No browne, no plalp bladder  Skin: No rash, Warm and dry      Labs:    Results from last 7 days  Lab Units 09/19/17  0458 09/18/17  0915   WBC 10*3/mm3 11.40* 11.97*   HEMOGLOBIN g/dL 10.9* 11.9   PLATELETS 10*3/mm3 283 301       Results from last 7 days  Lab Units 09/20/17  0639 09/19/17  0458 09/18/17  1111 09/18/17  0836   SODIUM mmol/L 139 138 140  --    POTASSIUM mmol/L 3.9 3.9 4.0  --    CHLORIDE mmol/L 108 104 103  --    CO2 mmol/L 26.0 25.0 25.0  --    BUN mg/dL 31* 38* 34*  --    CREATININE mg/dL 1.00 1.70* 1.60* 1.80*   CALCIUM mg/dL 8.6* 8.7 8.9  --    PHOSPHORUS mg/dL  --   --  3.4  --    ALBUMIN g/dL  --   --  3.60  --                Results from last 7 days  Lab Units 09/19/17  1432   COLOR UA  Yellow   CLARITY UA  Cloudy*   PH, URINE  <=5.0   SPECIFIC GRAVITY, URINE  1.019   GLUCOSE UA  Negative   KETONES UA  Negative   BILIRUBIN UA  Negative   PROTEIN UA  Negative   BLOOD UA  Negative   LEUKOCYTES UA  Moderate (2+)*   NITRITE UA  Positive*       Estimated Creatinine Clearance: 43.7 mL/min (by C-G formula based on Cr of 1).       EXAMINATION: US RENAL, " LIMITED-09/19/2017:      INDICATION: MIGUEL ANGEL.       TECHNIQUE: Ultrasound of kidneys and urinary bladder utilizing Doppler  and grayscale imaging techniques.      COMPARISON: NONE.      FINDINGS: Right kidney measures 9.3 cm in length containing multiple  subcentimeter tkcpjpetds-vj-kohmmnmc structures without evidence of  complex features consistent with renal cortical cyst. No hydronephrosis  or obvious calculi. Left kidney measures 8.6 cm in length containing  multiple primarily subcentimeter glsucddaei-vl-pqgxfejt lesions, the  largest of which measures 1.1 cm, consistent with renal cortical cysts.  No evidence for complex features. No hydronephrosis or obvious calculi.      Urinary bladder is partially distended and unremarkable.      IMPRESSION:  1. No sonographic evidence for abnormality within the visualized kidneys  or urinary bladder. Specifically, no evidence for hydronephrosis.  2. Multiple pupcrilzts-hk-fdupvxuq predominantly subcentimeter cystic  lesions within the bilateral kidneys consistent with renal cortical  cysts. No complex features are identified.      A/P:       ARF:  ? baseline.  Cr upto 1.7,  Back to 1.0 today.  May have been a little dehydrated on HCTZ with NSAIDs on diclofenc and RAAS suppression ( had been on lisinopril).  UA with possible UTI.      CKD3:  Will get old records for baseline Cr. U/S with smallish kidney, but no report of increased echogenicity or cortical thinning.  Will watch for now.      Hypotension:  Getting IVF.  Can give albumin if needed     Anemia:  stable.      PE:  + DVT with likely pulm infarct on CT scan.        Dhiraj Dolan MD  09/20/17  11:41 AM

## 2017-09-20 NOTE — PROGRESS NOTES
Continued Stay Note  [facility]     Patient Name: Iliana Oleary  MRN: [id number]  Today's Date: 9/20/2017    Admit Date: 9/18/2017          Discharge Plan       09/20/17 1025    Case Management/Social Work Plan    Plan Home with Home Health     Patient/Family In Agreement With Plan yes    Additional Comments Patient referral for HH with PT/OT/Nurse aid sent to [facility] as requested by the patient and recommended by PT and OT. [facility] notified that patient will lkely be going home today - Patient also notified that the transportation chair they asked about is not covered by insurance and would be much cheaper if purchased through a retail store such as [employer] than through a DME company. CM to follow for any needs - -0200               Discharge Codes     None            Malini Blank RN

## 2017-09-20 NOTE — PLAN OF CARE
Problem: Patient Care Overview (Adult)  Goal: Plan of Care Review  Outcome: Outcome(s) achieved Date Met:  09/20/17 09/20/17 0930   Coping/Psychosocial Response Interventions   Plan Of Care Reviewed With patient   Outcome Evaluation   Outcome Summary/Follow up Plan OT jaelyn complete. Pt presents with decreased activity tolerance, strength and balance limiting independence with ADLs and mobiltiy. Pt required min a bed mobiltiy and CGA with ambulation with O2 sat dropping to 89%. Pt is to be discharged home today. Recommend HHOT upon d/c.          Problem: Inpatient Occupational Therapy  Goal: Bed Mobility Goal LTG- OT  Outcome: Outcome(s) achieved Date Met:  09/20/17 09/20/17 0930   Bed Mobility OT LTG   Bed Mobility OT LTG, Date Established 09/20/17   Bed Mobility OT LTG, Time to Achieve by discharge   Bed Mobility OT LTG, Activity Type supine to sit/sit to supine   Bed Mobility OT LTG, Kenedy Level minimum assist (75% patient effort)   Bed Mobility OT LTG, Outcome goal met       Goal: Transfer Training Goal 1 LTG- OT  Outcome: Outcome(s) achieved Date Met:  09/20/17 09/20/17 0930   Transfer Training OT LTG   Transfer Training OT LTG, Date Established 09/20/17   Transfer Training OT LTG, Time to Achieve by discharge   Transfer Training OT LTG, Activity Type toilet   Transfer Training OT LTG, Kenedy Level minimum assist (75% patient effort)   Transfer Training OT LTG, Outcome goal met       Goal: Patient Education Goal LTG- OT  Outcome: Outcome(s) achieved Date Met:  09/20/17 09/20/17 0930   Patient Education OT LTG   Patient Education OT LTG, Date Established 09/20/17   Patient Education OT LTG, Time to Achieve by discharge   Patient Education OT LTG, Education Type adaptive breathing;home safety   Patient Education OT LTG, Education Understanding verbalizes understanding   Patient Education OT LTG Outcome goal met

## 2017-09-20 NOTE — PLAN OF CARE
Problem: Patient Care Overview (Adult)  Goal: Plan of Care Review  Outcome: Outcome(s) achieved Date Met:  09/20/17 09/20/17 0952   Coping/Psychosocial Response Interventions   Plan Of Care Reviewed With patient;spouse   Patient Care Overview   Progress progress toward functional goals as expected   Outcome Evaluation   Outcome Summary/Follow up Plan Presents w/ stable clinical status ( on Eliquis for probable P.E. & DVT X 3 IN RLE); able to amb 40 ft on RA w/ R wx & CGA;o2 sat. decr. to 89%, but recovers to 92 % w/ rest; STG's met; will d/c home today w/ HHPT f/u & spouse's asst;          Problem: Inpatient Physical Therapy  Goal: Bed Mobility Goal STG- PT  Outcome: Ongoing (interventions implemented as appropriate)    09/20/17 0952   Bed Mobility PT STG   Bed Mobility PT STG, Date Established 09/19/17   Bed Mobility PT STG, Time to Achieve 1 day   Bed Mobility PT STG, Activity Type supine to sit/sit to supine   Bed Mobility PT STG, Bartholomew Level minimum assist (75% patient effort)   Bed Mobility PT STG, Outcome goal met       Goal: Transfer Training Goal 1 STG- PT  Outcome: Ongoing (interventions implemented as appropriate)    09/20/17 0952   Transfer Training PT STG   Transfer Training PT STG, Date Established 09/20/17   Transfer Training PT STG, Time to Achieve 1 day   Transfer Training PT STG, Activity Type sit to stand/stand to sit   Transfer Training PT STG, Bartholomew Level contact guard assist;2 person assist required   Transfer Training PT STG, Assist Device walker, rolling   Transfer Training PT STG, Outcome goal met       Goal: Gait Training Goal STG- PT  Outcome: Outcome(s) achieved Date Met:  09/20/17 09/20/17 0952   Gait Training PT STG   Gait Training Goal PT STG, Date Established 09/20/17   Gait Training Goal PT STG, Time to Achieve 1 day   Gait Training Goal PT STG, Bartholomew Level contact guard assist;2 person assist required   Gait Training Goal PT STG, Assist Device walker,  rolling   Gait Training Goal PT STG, Distance to Achieve 40   Gait Training Goal PT STG, Outcome goal met       Goal: Patient Education Goal STG- PT  Outcome: Outcome(s) achieved Date Met:  09/20/17 09/20/17 0952   Patient Education PT STG   Patient Education PT STG, Date Established 09/20/17   Patient Education PT STG, Time to Achieve 1 day   Patient Education PT STG, Education Type written program;HEP;transfers;bed mobility;benefits of activity   Patient Education PT STG, Education Understanding demonstrate adequately;verbalize understanding   Patient Education PT STG Outcome goal met

## 2017-09-21 LAB
ALBUMIN 24H MFR UR ELPH: 22.7 %
ALBUMIN SERPL-MCNC: 2.4 G/DL (ref 2.9–4.4)
ALBUMIN/GLOB SERPL: 0.9 {RATIO} (ref 0.7–1.7)
ALPHA1 GLOB 24H MFR UR ELPH: 4.6 %
ALPHA1 GLOB FLD ELPH-MCNC: 0.3 G/DL (ref 0–0.4)
ALPHA2 GLOB 24H MFR UR ELPH: 10.1 %
ALPHA2 GLOB SERPL ELPH-MCNC: 0.9 G/DL (ref 0.4–1)
B-GLOBULIN MFR UR ELPH: 31.2 %
B-GLOBULIN SERPL ELPH-MCNC: 0.9 G/DL (ref 0.7–1.3)
GAMMA GLOB 24H MFR UR ELPH: 31.4 %
GAMMA GLOB SERPL ELPH-MCNC: 0.6 G/DL (ref 0.4–1.8)
GLOBULIN SER CALC-MCNC: 2.7 G/DL (ref 2.2–3.9)
Lab: ABNORMAL
Lab: NORMAL
M PROTEIN 24H UR ELPH-MRATE: NORMAL MG/24 HR
M PROTEIN MFR UR ELPH: NORMAL %
M-SPIKE: ABNORMAL G/DL
PROT 24H UR-MRATE: NORMAL MG/24 HR (ref 30–150)
PROT SERPL-MCNC: 5.1 G/DL (ref 6–8.5)
PROT UR-MCNC: 15.4 MG/DL

## 2017-09-23 ENCOUNTER — APPOINTMENT (OUTPATIENT)
Dept: GENERAL RADIOLOGY | Facility: HOSPITAL | Age: 82
End: 2017-09-23

## 2017-09-23 ENCOUNTER — APPOINTMENT (OUTPATIENT)
Dept: CT IMAGING | Facility: HOSPITAL | Age: 82
End: 2017-09-23

## 2017-09-23 ENCOUNTER — HOSPITAL ENCOUNTER (INPATIENT)
Facility: HOSPITAL | Age: 82
LOS: 9 days | Discharge: NURSING, REHAB OR LONG-TERM CARE | End: 2017-10-02
Attending: EMERGENCY MEDICINE | Admitting: INTERNAL MEDICINE

## 2017-09-23 DIAGNOSIS — A41.9 SEPSIS, DUE TO UNSPECIFIED ORGANISM: ICD-10-CM

## 2017-09-23 DIAGNOSIS — R62.7 ADULT FAILURE TO THRIVE SYNDROME: ICD-10-CM

## 2017-09-23 DIAGNOSIS — Z79.01 CHRONIC ANTICOAGULATION: ICD-10-CM

## 2017-09-23 DIAGNOSIS — Z74.09 IMPAIRED FUNCTIONAL MOBILITY, BALANCE, GAIT, AND ENDURANCE: ICD-10-CM

## 2017-09-23 DIAGNOSIS — N30.00 ACUTE CYSTITIS WITHOUT HEMATURIA: ICD-10-CM

## 2017-09-23 DIAGNOSIS — S09.90XA HEAD INJURY DUE TO TRAUMA, INITIAL ENCOUNTER: ICD-10-CM

## 2017-09-23 DIAGNOSIS — R55 SYNCOPE, UNSPECIFIED SYNCOPE TYPE: Primary | ICD-10-CM

## 2017-09-23 DIAGNOSIS — R60.9 PERIPHERAL EDEMA: ICD-10-CM

## 2017-09-23 LAB
ALBUMIN SERPL-MCNC: 3.6 G/DL (ref 3.2–4.8)
ALBUMIN/GLOB SERPL: 1.3 G/DL (ref 1.5–2.5)
ALP SERPL-CCNC: 87 U/L (ref 25–100)
ALT SERPL W P-5'-P-CCNC: 19 U/L (ref 7–40)
ANION GAP SERPL CALCULATED.3IONS-SCNC: 12 MMOL/L (ref 3–11)
AST SERPL-CCNC: 41 U/L (ref 0–33)
BACTERIA UR QL AUTO: ABNORMAL /HPF
BASOPHILS # BLD AUTO: 0.09 10*3/MM3 (ref 0–0.2)
BASOPHILS NFR BLD AUTO: 0.6 % (ref 0–1)
BILIRUB SERPL-MCNC: 0.3 MG/DL (ref 0.3–1.2)
BILIRUB UR QL STRIP: NEGATIVE
BNP SERPL-MCNC: 173 PG/ML (ref 0–100)
BUN BLD-MCNC: 25 MG/DL (ref 9–23)
BUN/CREAT SERPL: 25 (ref 7–25)
C DIFF TOX GENS STL QL NAA+PROBE: NOT DETECTED
CALCIUM SPEC-SCNC: 9.1 MG/DL (ref 8.7–10.4)
CHLORIDE SERPL-SCNC: 106 MMOL/L (ref 99–109)
CK SERPL-CCNC: 60 U/L (ref 26–174)
CLARITY UR: ABNORMAL
CO2 SERPL-SCNC: 20 MMOL/L (ref 20–31)
COLOR UR: YELLOW
CREAT BLD-MCNC: 1 MG/DL (ref 0.6–1.3)
D-LACTATE SERPL-SCNC: 2.1 MMOL/L (ref 0.5–2)
D-LACTATE SERPL-SCNC: 5.1 MMOL/L (ref 0.5–2)
DEPRECATED RDW RBC AUTO: 53.7 FL (ref 37–54)
EOSINOPHIL # BLD AUTO: 0.83 10*3/MM3 (ref 0–0.3)
EOSINOPHIL NFR BLD AUTO: 5.5 % (ref 0–3)
ERYTHROCYTE [DISTWIDTH] IN BLOOD BY AUTOMATED COUNT: 15.8 % (ref 11.3–14.5)
FINE GRAN CASTS URNS QL MICRO: ABNORMAL /LPF
GFR SERPL CREATININE-BSD FRML MDRD: 53 ML/MIN/1.73
GLOBULIN UR ELPH-MCNC: 2.8 GM/DL
GLUCOSE BLD-MCNC: 194 MG/DL (ref 70–100)
GLUCOSE BLDC GLUCOMTR-MCNC: 105 MG/DL (ref 70–130)
GLUCOSE BLDC GLUCOMTR-MCNC: 181 MG/DL (ref 70–130)
GLUCOSE BLDC GLUCOMTR-MCNC: 240 MG/DL (ref 70–130)
GLUCOSE UR STRIP-MCNC: NEGATIVE MG/DL
HCT VFR BLD AUTO: 38.3 % (ref 34.5–44)
HGB BLD-MCNC: 11.6 G/DL (ref 11.5–15.5)
HGB UR QL STRIP.AUTO: NEGATIVE
HOLD SPECIMEN: NORMAL
HOLD SPECIMEN: NORMAL
HYALINE CASTS UR QL AUTO: ABNORMAL /LPF
IMM GRANULOCYTES # BLD: 0.35 10*3/MM3 (ref 0–0.03)
IMM GRANULOCYTES NFR BLD: 2.3 % (ref 0–0.6)
KETONES UR QL STRIP: NEGATIVE
LEUKOCYTE ESTERASE UR QL STRIP.AUTO: NEGATIVE
LYMPHOCYTES # BLD AUTO: 3.61 10*3/MM3 (ref 0.6–4.8)
LYMPHOCYTES NFR BLD AUTO: 24.1 % (ref 24–44)
MAGNESIUM SERPL-MCNC: 1.7 MG/DL (ref 1.3–2.7)
MCH RBC QN AUTO: 28 PG (ref 27–31)
MCHC RBC AUTO-ENTMCNC: 30.3 G/DL (ref 32–36)
MCV RBC AUTO: 92.3 FL (ref 80–99)
MONOCYTES # BLD AUTO: 1.33 10*3/MM3 (ref 0–1)
MONOCYTES NFR BLD AUTO: 8.9 % (ref 0–12)
NEUTROPHILS # BLD AUTO: 8.75 10*3/MM3 (ref 1.5–8.3)
NEUTROPHILS NFR BLD AUTO: 58.6 % (ref 41–71)
NITRITE UR QL STRIP: NEGATIVE
PH UR STRIP.AUTO: <=5 [PH] (ref 5–8)
PLATELET # BLD AUTO: 378 10*3/MM3 (ref 150–450)
PMV BLD AUTO: 9.6 FL (ref 6–12)
POTASSIUM BLD-SCNC: 4.2 MMOL/L (ref 3.5–5.5)
PROCALCITONIN SERPL-MCNC: 0.1 NG/ML
PROT SERPL-MCNC: 6.4 G/DL (ref 5.7–8.2)
PROT UR QL STRIP: ABNORMAL
RBC # BLD AUTO: 4.15 10*6/MM3 (ref 3.89–5.14)
RBC # UR: ABNORMAL /HPF
REF LAB TEST METHOD: ABNORMAL
RENAL EPI CELLS #/AREA URNS HPF: ABNORMAL /HPF
SODIUM BLD-SCNC: 138 MMOL/L (ref 132–146)
SP GR UR STRIP: 1.02 (ref 1–1.03)
SQUAMOUS #/AREA URNS HPF: ABNORMAL /HPF
TROPONIN I SERPL-MCNC: 0.02 NG/ML (ref 0–0.07)
UNIDENT CRYS URNS QL MICRO: ABNORMAL /HPF
UROBILINOGEN UR QL STRIP: ABNORMAL
WBC NRBC COR # BLD: 14.96 10*3/MM3 (ref 3.5–10.8)
WBC UR QL AUTO: ABNORMAL /HPF
WHOLE BLOOD HOLD SPECIMEN: NORMAL
WHOLE BLOOD HOLD SPECIMEN: NORMAL

## 2017-09-23 PROCEDURE — 99223 1ST HOSP IP/OBS HIGH 75: CPT | Performed by: INTERNAL MEDICINE

## 2017-09-23 PROCEDURE — 82550 ASSAY OF CK (CPK): CPT | Performed by: EMERGENCY MEDICINE

## 2017-09-23 PROCEDURE — 81001 URINALYSIS AUTO W/SCOPE: CPT | Performed by: EMERGENCY MEDICINE

## 2017-09-23 PROCEDURE — 25010000002 ONDANSETRON PER 1 MG: Performed by: EMERGENCY MEDICINE

## 2017-09-23 PROCEDURE — 83605 ASSAY OF LACTIC ACID: CPT | Performed by: EMERGENCY MEDICINE

## 2017-09-23 PROCEDURE — 87493 C DIFF AMPLIFIED PROBE: CPT | Performed by: INTERNAL MEDICINE

## 2017-09-23 PROCEDURE — 93005 ELECTROCARDIOGRAM TRACING: CPT | Performed by: EMERGENCY MEDICINE

## 2017-09-23 PROCEDURE — 25010000003 CEFTRIAXONE PER 250 MG: Performed by: NURSE PRACTITIONER

## 2017-09-23 PROCEDURE — 80053 COMPREHEN METABOLIC PANEL: CPT

## 2017-09-23 PROCEDURE — 25010000002 PIPERACILLIN SOD-TAZOBACTAM PER 1 G: Performed by: EMERGENCY MEDICINE

## 2017-09-23 PROCEDURE — 99285 EMERGENCY DEPT VISIT HI MDM: CPT

## 2017-09-23 PROCEDURE — 85025 COMPLETE CBC W/AUTO DIFF WBC: CPT

## 2017-09-23 PROCEDURE — 82962 GLUCOSE BLOOD TEST: CPT

## 2017-09-23 PROCEDURE — 70450 CT HEAD/BRAIN W/O DYE: CPT

## 2017-09-23 PROCEDURE — 73630 X-RAY EXAM OF FOOT: CPT

## 2017-09-23 PROCEDURE — 84484 ASSAY OF TROPONIN QUANT: CPT

## 2017-09-23 PROCEDURE — 87040 BLOOD CULTURE FOR BACTERIA: CPT | Performed by: EMERGENCY MEDICINE

## 2017-09-23 PROCEDURE — 25010000002 VANCOMYCIN HCL IN NACL 2-0.9 GM/500ML-% SOLUTION: Performed by: EMERGENCY MEDICINE

## 2017-09-23 PROCEDURE — 71250 CT THORAX DX C-: CPT

## 2017-09-23 PROCEDURE — 83880 ASSAY OF NATRIURETIC PEPTIDE: CPT | Performed by: EMERGENCY MEDICINE

## 2017-09-23 PROCEDURE — 87086 URINE CULTURE/COLONY COUNT: CPT | Performed by: EMERGENCY MEDICINE

## 2017-09-23 PROCEDURE — 84145 PROCALCITONIN (PCT): CPT | Performed by: EMERGENCY MEDICINE

## 2017-09-23 PROCEDURE — 83735 ASSAY OF MAGNESIUM: CPT

## 2017-09-23 RX ORDER — SODIUM CHLORIDE 0.9 % (FLUSH) 0.9 %
1-10 SYRINGE (ML) INJECTION AS NEEDED
Status: DISCONTINUED | OUTPATIENT
Start: 2017-09-23 | End: 2017-10-02 | Stop reason: HOSPADM

## 2017-09-23 RX ORDER — BUDESONIDE 3 MG/1
3 CAPSULE, COATED PELLETS ORAL
Status: DISCONTINUED | OUTPATIENT
Start: 2017-09-23 | End: 2017-10-02 | Stop reason: HOSPADM

## 2017-09-23 RX ORDER — ATORVASTATIN CALCIUM 40 MG/1
40 TABLET, FILM COATED ORAL NIGHTLY
Status: DISCONTINUED | OUTPATIENT
Start: 2017-09-23 | End: 2017-10-02 | Stop reason: HOSPADM

## 2017-09-23 RX ORDER — GABAPENTIN 100 MG/1
200 CAPSULE ORAL EVERY EVENING
Status: DISCONTINUED | OUTPATIENT
Start: 2017-09-23 | End: 2017-09-26

## 2017-09-23 RX ORDER — GABAPENTIN 100 MG/1
200 CAPSULE ORAL EVERY EVENING
COMMUNITY
End: 2017-10-02 | Stop reason: HOSPADM

## 2017-09-23 RX ORDER — GABAPENTIN 100 MG/1
100 CAPSULE ORAL EVERY MORNING
Status: DISCONTINUED | OUTPATIENT
Start: 2017-09-24 | End: 2017-09-26

## 2017-09-23 RX ORDER — ONDANSETRON 2 MG/ML
4 INJECTION INTRAMUSCULAR; INTRAVENOUS ONCE
Status: COMPLETED | OUTPATIENT
Start: 2017-09-23 | End: 2017-09-23

## 2017-09-23 RX ORDER — CEFTRIAXONE SODIUM 2 G/50ML
2 INJECTION, SOLUTION INTRAVENOUS EVERY 24 HOURS
Status: DISCONTINUED | OUTPATIENT
Start: 2017-09-23 | End: 2017-09-25

## 2017-09-23 RX ORDER — FLUOXETINE HYDROCHLORIDE 20 MG/1
20 CAPSULE ORAL DAILY
Status: DISCONTINUED | OUTPATIENT
Start: 2017-09-23 | End: 2017-10-02 | Stop reason: HOSPADM

## 2017-09-23 RX ORDER — ASPIRIN 81 MG/1
81 TABLET ORAL EVERY MORNING
Status: DISCONTINUED | OUTPATIENT
Start: 2017-09-24 | End: 2017-10-02 | Stop reason: HOSPADM

## 2017-09-23 RX ORDER — ACETAMINOPHEN 325 MG/1
650 TABLET ORAL EVERY 6 HOURS PRN
Status: DISCONTINUED | OUTPATIENT
Start: 2017-09-23 | End: 2017-10-02 | Stop reason: HOSPADM

## 2017-09-23 RX ORDER — HYDROCODONE BITARTRATE AND ACETAMINOPHEN 7.5; 325 MG/1; MG/1
1 TABLET ORAL EVERY 6 HOURS PRN
Status: DISCONTINUED | OUTPATIENT
Start: 2017-09-23 | End: 2017-09-26

## 2017-09-23 RX ORDER — SODIUM CHLORIDE 0.9 % (FLUSH) 0.9 %
10 SYRINGE (ML) INJECTION AS NEEDED
Status: DISCONTINUED | OUTPATIENT
Start: 2017-09-23 | End: 2017-10-02 | Stop reason: HOSPADM

## 2017-09-23 RX ORDER — POLYETHYLENE GLYCOL 3350 17 G/17G
17 POWDER, FOR SOLUTION ORAL DAILY
Status: DISCONTINUED | OUTPATIENT
Start: 2017-09-23 | End: 2017-10-02 | Stop reason: HOSPADM

## 2017-09-23 RX ORDER — FUROSEMIDE 40 MG/1
40 TABLET ORAL 2 TIMES DAILY
Status: DISCONTINUED | OUTPATIENT
Start: 2017-09-23 | End: 2017-09-24

## 2017-09-23 RX ORDER — VANCOMYCIN 2 GRAM/500 ML IN 0.9 % SODIUM CHLORIDE INTRAVENOUS
20 ONCE
Status: COMPLETED | OUTPATIENT
Start: 2017-09-23 | End: 2017-09-23

## 2017-09-23 RX ORDER — PANTOPRAZOLE SODIUM 40 MG/1
40 TABLET, DELAYED RELEASE ORAL EVERY MORNING
Status: DISCONTINUED | OUTPATIENT
Start: 2017-09-24 | End: 2017-10-02 | Stop reason: HOSPADM

## 2017-09-23 RX ORDER — DOCUSATE SODIUM 100 MG/1
100 CAPSULE, LIQUID FILLED ORAL 2 TIMES DAILY
Status: DISCONTINUED | OUTPATIENT
Start: 2017-09-23 | End: 2017-10-02 | Stop reason: HOSPADM

## 2017-09-23 RX ORDER — HYDROCODONE BITARTRATE AND ACETAMINOPHEN 5; 325 MG/1; MG/1
2 TABLET ORAL 3 TIMES DAILY
Status: DISCONTINUED | OUTPATIENT
Start: 2017-09-23 | End: 2017-09-23

## 2017-09-23 RX ADMIN — SODIUM CHLORIDE 2994 ML: 9 INJECTION, SOLUTION INTRAVENOUS at 15:07

## 2017-09-23 RX ADMIN — ONDANSETRON 4 MG: 2 INJECTION INTRAMUSCULAR; INTRAVENOUS at 12:45

## 2017-09-23 RX ADMIN — TAZOBACTAM SODIUM AND PIPERACILLIN SODIUM 4.5 G: 500; 4 INJECTION, SOLUTION INTRAVENOUS at 15:08

## 2017-09-23 RX ADMIN — BUDESONIDE 3 MG: 3 CAPSULE ORAL at 20:32

## 2017-09-23 RX ADMIN — CEFTRIAXONE SODIUM 2 G: 2 INJECTION, SOLUTION INTRAVENOUS at 20:32

## 2017-09-23 RX ADMIN — APIXABAN 5 MG: 5 TABLET, FILM COATED ORAL at 20:21

## 2017-09-23 RX ADMIN — SODIUM CHLORIDE 500 ML: 9 INJECTION, SOLUTION INTRAVENOUS at 13:05

## 2017-09-23 RX ADMIN — ATORVASTATIN CALCIUM 40 MG: 40 TABLET, FILM COATED ORAL at 20:21

## 2017-09-23 RX ADMIN — Medication 2000 MG: at 16:13

## 2017-09-24 PROBLEM — L03.90 CELLULITIS: Status: ACTIVE | Noted: 2017-09-24

## 2017-09-24 PROBLEM — D72.829 LEUKOCYTOSIS: Status: ACTIVE | Noted: 2017-09-24

## 2017-09-24 PROBLEM — E87.20 LACTIC ACIDOSIS: Status: ACTIVE | Noted: 2017-09-24

## 2017-09-24 LAB
ALBUMIN SERPL-MCNC: 3 G/DL (ref 3.2–4.8)
ALBUMIN/GLOB SERPL: 1.3 G/DL (ref 1.5–2.5)
ALP SERPL-CCNC: 67 U/L (ref 25–100)
ALT SERPL W P-5'-P-CCNC: 13 U/L (ref 7–40)
ANION GAP SERPL CALCULATED.3IONS-SCNC: 9 MMOL/L (ref 3–11)
AST SERPL-CCNC: 28 U/L (ref 0–33)
BASOPHILS # BLD AUTO: 0.05 10*3/MM3 (ref 0–0.2)
BASOPHILS NFR BLD AUTO: 0.5 % (ref 0–1)
BILIRUB SERPL-MCNC: 0.3 MG/DL (ref 0.3–1.2)
BUN BLD-MCNC: 22 MG/DL (ref 9–23)
BUN/CREAT SERPL: 22 (ref 7–25)
CALCIUM SPEC-SCNC: 8.4 MG/DL (ref 8.7–10.4)
CHLORIDE SERPL-SCNC: 111 MMOL/L (ref 99–109)
CO2 SERPL-SCNC: 23 MMOL/L (ref 20–31)
CREAT BLD-MCNC: 1 MG/DL (ref 0.6–1.3)
D-LACTATE SERPL-SCNC: 0.9 MMOL/L (ref 0.5–2)
DEPRECATED RDW RBC AUTO: 54.3 FL (ref 37–54)
EOSINOPHIL # BLD AUTO: 0.41 10*3/MM3 (ref 0–0.3)
EOSINOPHIL NFR BLD AUTO: 3.8 % (ref 0–3)
ERYTHROCYTE [DISTWIDTH] IN BLOOD BY AUTOMATED COUNT: 15.8 % (ref 11.3–14.5)
GFR SERPL CREATININE-BSD FRML MDRD: 53 ML/MIN/1.73
GLOBULIN UR ELPH-MCNC: 2.3 GM/DL
GLUCOSE BLD-MCNC: 91 MG/DL (ref 70–100)
GLUCOSE BLDC GLUCOMTR-MCNC: 117 MG/DL (ref 70–130)
HCT VFR BLD AUTO: 34.2 % (ref 34.5–44)
HGB BLD-MCNC: 10.1 G/DL (ref 11.5–15.5)
IMM GRANULOCYTES # BLD: 0.12 10*3/MM3 (ref 0–0.03)
IMM GRANULOCYTES NFR BLD: 1.1 % (ref 0–0.6)
LYMPHOCYTES # BLD AUTO: 1.62 10*3/MM3 (ref 0.6–4.8)
LYMPHOCYTES NFR BLD AUTO: 15 % (ref 24–44)
MCH RBC QN AUTO: 27.5 PG (ref 27–31)
MCHC RBC AUTO-ENTMCNC: 29.5 G/DL (ref 32–36)
MCV RBC AUTO: 93.2 FL (ref 80–99)
MONOCYTES # BLD AUTO: 1.15 10*3/MM3 (ref 0–1)
MONOCYTES NFR BLD AUTO: 10.6 % (ref 0–12)
NEUTROPHILS # BLD AUTO: 7.46 10*3/MM3 (ref 1.5–8.3)
NEUTROPHILS NFR BLD AUTO: 69 % (ref 41–71)
PLATELET # BLD AUTO: 326 10*3/MM3 (ref 150–450)
PMV BLD AUTO: 9.4 FL (ref 6–12)
POTASSIUM BLD-SCNC: 4.4 MMOL/L (ref 3.5–5.5)
PROT SERPL-MCNC: 5.3 G/DL (ref 5.7–8.2)
RBC # BLD AUTO: 3.67 10*6/MM3 (ref 3.89–5.14)
SODIUM BLD-SCNC: 143 MMOL/L (ref 132–146)
WBC NRBC COR # BLD: 10.81 10*3/MM3 (ref 3.5–10.8)

## 2017-09-24 PROCEDURE — 82962 GLUCOSE BLOOD TEST: CPT

## 2017-09-24 PROCEDURE — 99233 SBSQ HOSP IP/OBS HIGH 50: CPT | Performed by: INTERNAL MEDICINE

## 2017-09-24 PROCEDURE — 85025 COMPLETE CBC W/AUTO DIFF WBC: CPT | Performed by: NURSE PRACTITIONER

## 2017-09-24 PROCEDURE — 83605 ASSAY OF LACTIC ACID: CPT | Performed by: NURSE PRACTITIONER

## 2017-09-24 PROCEDURE — 25010000003 CEFTRIAXONE PER 250 MG: Performed by: NURSE PRACTITIONER

## 2017-09-24 PROCEDURE — 97162 PT EVAL MOD COMPLEX 30 MIN: CPT

## 2017-09-24 PROCEDURE — 80053 COMPREHEN METABOLIC PANEL: CPT | Performed by: NURSE PRACTITIONER

## 2017-09-24 RX ORDER — LISINOPRIL 10 MG/1
10 TABLET ORAL DAILY
COMMUNITY
End: 2017-10-02 | Stop reason: HOSPADM

## 2017-09-24 RX ORDER — SODIUM CHLORIDE 9 MG/ML
100 INJECTION, SOLUTION INTRAVENOUS CONTINUOUS
Status: DISCONTINUED | OUTPATIENT
Start: 2017-09-24 | End: 2017-09-25

## 2017-09-24 RX ORDER — BENZONATATE 100 MG/1
100 CAPSULE ORAL 3 TIMES DAILY PRN
Status: DISCONTINUED | OUTPATIENT
Start: 2017-09-24 | End: 2017-10-02 | Stop reason: HOSPADM

## 2017-09-24 RX ADMIN — HYDROCODONE BITARTRATE AND ACETAMINOPHEN 1 TABLET: 7.5; 325 TABLET ORAL at 05:09

## 2017-09-24 RX ADMIN — ATORVASTATIN CALCIUM 40 MG: 40 TABLET, FILM COATED ORAL at 20:04

## 2017-09-24 RX ADMIN — GABAPENTIN 100 MG: 100 CAPSULE ORAL at 05:09

## 2017-09-24 RX ADMIN — BENZONATATE 100 MG: 100 CAPSULE ORAL at 20:04

## 2017-09-24 RX ADMIN — APIXABAN 5 MG: 5 TABLET, FILM COATED ORAL at 08:58

## 2017-09-24 RX ADMIN — HYDROCODONE BITARTRATE AND ACETAMINOPHEN 1 TABLET: 7.5; 325 TABLET ORAL at 20:04

## 2017-09-24 RX ADMIN — DOCUSATE SODIUM 100 MG: 100 CAPSULE, LIQUID FILLED ORAL at 08:58

## 2017-09-24 RX ADMIN — PANTOPRAZOLE SODIUM 40 MG: 40 TABLET, DELAYED RELEASE ORAL at 05:09

## 2017-09-24 RX ADMIN — SODIUM CHLORIDE 100 ML/HR: 9 INJECTION, SOLUTION INTRAVENOUS at 11:57

## 2017-09-24 RX ADMIN — ASPIRIN 81 MG: 81 TABLET, COATED ORAL at 05:09

## 2017-09-24 RX ADMIN — SODIUM CHLORIDE 100 ML/HR: 9 INJECTION, SOLUTION INTRAVENOUS at 22:25

## 2017-09-24 RX ADMIN — GABAPENTIN 200 MG: 100 CAPSULE ORAL at 17:50

## 2017-09-24 RX ADMIN — APIXABAN 5 MG: 5 TABLET, FILM COATED ORAL at 17:50

## 2017-09-24 RX ADMIN — FLUOXETINE HYDROCHLORIDE 20 MG: 20 CAPSULE ORAL at 08:58

## 2017-09-24 RX ADMIN — BUDESONIDE 3 MG: 3 CAPSULE ORAL at 09:47

## 2017-09-24 RX ADMIN — HYDROCODONE BITARTRATE AND ACETAMINOPHEN 1 TABLET: 7.5; 325 TABLET ORAL at 14:22

## 2017-09-24 RX ADMIN — CEFTRIAXONE SODIUM 2 G: 2 INJECTION, SOLUTION INTRAVENOUS at 20:04

## 2017-09-25 ENCOUNTER — APPOINTMENT (OUTPATIENT)
Dept: GENERAL RADIOLOGY | Facility: HOSPITAL | Age: 82
End: 2017-09-25

## 2017-09-25 PROBLEM — E87.29 RESPIRATORY ACIDOSIS: Status: ACTIVE | Noted: 2017-09-25

## 2017-09-25 LAB
ANION GAP SERPL CALCULATED.3IONS-SCNC: 6 MMOL/L (ref 3–11)
ARTERIAL PATENCY WRIST A: ABNORMAL
ARTERIAL PATENCY WRIST A: ABNORMAL
ATMOSPHERIC PRESS: ABNORMAL MMHG
ATMOSPHERIC PRESS: ABNORMAL MMHG
BACTERIA SPEC AEROBE CULT: NORMAL
BASE EXCESS BLDA CALC-SCNC: -2 MMOL/L (ref 0–2)
BASE EXCESS BLDA CALC-SCNC: -3 MMOL/L (ref 0–2)
BASOPHILS # BLD AUTO: 0.08 10*3/MM3 (ref 0–0.2)
BASOPHILS NFR BLD AUTO: 0.8 % (ref 0–1)
BDY SITE: ABNORMAL
BDY SITE: ABNORMAL
BNP SERPL-MCNC: 314 PG/ML (ref 0–100)
BUN BLD-MCNC: 20 MG/DL (ref 9–23)
BUN/CREAT SERPL: 22.2 (ref 7–25)
CALCIUM SPEC-SCNC: 8.4 MG/DL (ref 8.7–10.4)
CHLORIDE SERPL-SCNC: 111 MMOL/L (ref 99–109)
CO2 BLDA-SCNC: 25.6 MMOL/L (ref 22–33)
CO2 BLDA-SCNC: 25.8 MMOL/L (ref 22–33)
CO2 SERPL-SCNC: 25 MMOL/L (ref 20–31)
COHGB MFR BLD: 0.9 % (ref 0–2)
COHGB MFR BLD: 1.2 % (ref 0–2)
CREAT BLD-MCNC: 0.9 MG/DL (ref 0.6–1.3)
D-LACTATE SERPL-SCNC: 0.5 MMOL/L (ref 0.5–2)
DEPRECATED RDW RBC AUTO: 56.1 FL (ref 37–54)
EOSINOPHIL # BLD AUTO: 0.59 10*3/MM3 (ref 0–0.3)
EOSINOPHIL NFR BLD AUTO: 5.6 % (ref 0–3)
ERYTHROCYTE [DISTWIDTH] IN BLOOD BY AUTOMATED COUNT: 16.1 % (ref 11.3–14.5)
GFR SERPL CREATININE-BSD FRML MDRD: 60 ML/MIN/1.73
GLUCOSE BLD-MCNC: 122 MG/DL (ref 70–100)
HCO3 BLDA-SCNC: 24.2 MMOL/L (ref 20–26)
HCO3 BLDA-SCNC: 24.2 MMOL/L (ref 20–26)
HCT VFR BLD AUTO: 32.3 % (ref 34.5–44)
HCT VFR BLD CALC: 30.1 %
HCT VFR BLD CALC: 30.2 %
HGB BLD-MCNC: 9.4 G/DL (ref 11.5–15.5)
HGB BLDA-MCNC: 9.8 G/DL (ref 14–18)
HGB BLDA-MCNC: 9.9 G/DL (ref 14–18)
HOROWITZ INDEX BLD+IHG-RTO: 35 %
HOROWITZ INDEX BLD+IHG-RTO: 40 %
IMM GRANULOCYTES # BLD: 0.13 10*3/MM3 (ref 0–0.03)
IMM GRANULOCYTES NFR BLD: 1.2 % (ref 0–0.6)
LYMPHOCYTES # BLD AUTO: 1.66 10*3/MM3 (ref 0.6–4.8)
LYMPHOCYTES NFR BLD AUTO: 15.7 % (ref 24–44)
MCH RBC QN AUTO: 27.6 PG (ref 27–31)
MCHC RBC AUTO-ENTMCNC: 29.1 G/DL (ref 32–36)
MCV RBC AUTO: 94.7 FL (ref 80–99)
METHGB BLD QL: 0.9 % (ref 0–1.5)
METHGB BLD QL: 1.1 % (ref 0–1.5)
MODALITY: ABNORMAL
MODALITY: ABNORMAL
MONOCYTES # BLD AUTO: 1.06 10*3/MM3 (ref 0–1)
MONOCYTES NFR BLD AUTO: 10.1 % (ref 0–12)
NEUTROPHILS # BLD AUTO: 7.02 10*3/MM3 (ref 1.5–8.3)
NEUTROPHILS NFR BLD AUTO: 66.6 % (ref 41–71)
OXYHGB MFR BLDV: 95.5 % (ref 94–99)
OXYHGB MFR BLDV: 96.6 % (ref 94–99)
PCO2 BLDA: 47 MM HG (ref 35–45)
PCO2 BLDA: 52.7 MM HG (ref 35–45)
PH BLDA: 7.27 PH UNITS (ref 7.35–7.45)
PH BLDA: 7.32 PH UNITS (ref 7.35–7.45)
PLATELET # BLD AUTO: 306 10*3/MM3 (ref 150–450)
PMV BLD AUTO: 9.3 FL (ref 6–12)
PO2 BLDA: 108 MM HG (ref 83–108)
PO2 BLDA: 88 MM HG (ref 83–108)
POTASSIUM BLD-SCNC: 4.5 MMOL/L (ref 3.5–5.5)
PROCALCITONIN SERPL-MCNC: 0.3 NG/ML
RBC # BLD AUTO: 3.41 10*6/MM3 (ref 3.89–5.14)
SAO2 % BLDCOA: 96.6 %
SODIUM BLD-SCNC: 142 MMOL/L (ref 132–146)
WBC NRBC COR # BLD: 10.54 10*3/MM3 (ref 3.5–10.8)

## 2017-09-25 PROCEDURE — 25010000002 PIPERACILLIN SOD-TAZOBACTAM PER 1 G: Performed by: NURSE PRACTITIONER

## 2017-09-25 PROCEDURE — 83880 ASSAY OF NATRIURETIC PEPTIDE: CPT | Performed by: NURSE PRACTITIONER

## 2017-09-25 PROCEDURE — 82805 BLOOD GASES W/O2 SATURATION: CPT | Performed by: NURSE PRACTITIONER

## 2017-09-25 PROCEDURE — 94799 UNLISTED PULMONARY SVC/PX: CPT

## 2017-09-25 PROCEDURE — 5A09357 ASSISTANCE WITH RESPIRATORY VENTILATION, LESS THAN 24 CONSECUTIVE HOURS, CONTINUOUS POSITIVE AIRWAY PRESSURE: ICD-10-PCS | Performed by: INTERNAL MEDICINE

## 2017-09-25 PROCEDURE — 97116 GAIT TRAINING THERAPY: CPT

## 2017-09-25 PROCEDURE — 83605 ASSAY OF LACTIC ACID: CPT | Performed by: NURSE PRACTITIONER

## 2017-09-25 PROCEDURE — 36600 WITHDRAWAL OF ARTERIAL BLOOD: CPT | Performed by: NURSE PRACTITIONER

## 2017-09-25 PROCEDURE — 85025 COMPLETE CBC W/AUTO DIFF WBC: CPT | Performed by: NURSE PRACTITIONER

## 2017-09-25 PROCEDURE — 94660 CPAP INITIATION&MGMT: CPT

## 2017-09-25 PROCEDURE — 94640 AIRWAY INHALATION TREATMENT: CPT

## 2017-09-25 PROCEDURE — 99233 SBSQ HOSP IP/OBS HIGH 50: CPT | Performed by: INTERNAL MEDICINE

## 2017-09-25 PROCEDURE — 80048 BASIC METABOLIC PNL TOTAL CA: CPT | Performed by: NURSE PRACTITIONER

## 2017-09-25 PROCEDURE — 84145 PROCALCITONIN (PCT): CPT | Performed by: NURSE PRACTITIONER

## 2017-09-25 PROCEDURE — 25010000002 ONDANSETRON PER 1 MG: Performed by: INTERNAL MEDICINE

## 2017-09-25 PROCEDURE — 82805 BLOOD GASES W/O2 SATURATION: CPT | Performed by: INTERNAL MEDICINE

## 2017-09-25 PROCEDURE — 92610 EVALUATE SWALLOWING FUNCTION: CPT

## 2017-09-25 PROCEDURE — 71010 HC CHEST PA OR AP: CPT

## 2017-09-25 PROCEDURE — 97110 THERAPEUTIC EXERCISES: CPT

## 2017-09-25 PROCEDURE — 94760 N-INVAS EAR/PLS OXIMETRY 1: CPT

## 2017-09-25 PROCEDURE — 87205 SMEAR GRAM STAIN: CPT | Performed by: NURSE PRACTITIONER

## 2017-09-25 PROCEDURE — 36600 WITHDRAWAL OF ARTERIAL BLOOD: CPT | Performed by: INTERNAL MEDICINE

## 2017-09-25 PROCEDURE — 87070 CULTURE OTHR SPECIMN AEROBIC: CPT | Performed by: NURSE PRACTITIONER

## 2017-09-25 RX ORDER — DOXYCYCLINE HYCLATE 100 MG/1
100 CAPSULE ORAL EVERY 12 HOURS SCHEDULED
Status: DISCONTINUED | OUTPATIENT
Start: 2017-09-25 | End: 2017-09-25

## 2017-09-25 RX ORDER — IPRATROPIUM BROMIDE AND ALBUTEROL SULFATE 2.5; .5 MG/3ML; MG/3ML
3 SOLUTION RESPIRATORY (INHALATION) ONCE
Status: COMPLETED | OUTPATIENT
Start: 2017-09-25 | End: 2017-09-25

## 2017-09-25 RX ORDER — ONDANSETRON 2 MG/ML
4 INJECTION INTRAMUSCULAR; INTRAVENOUS EVERY 6 HOURS PRN
Status: DISCONTINUED | OUTPATIENT
Start: 2017-09-25 | End: 2017-10-02 | Stop reason: HOSPADM

## 2017-09-25 RX ORDER — GUAIFENESIN 600 MG/1
600 TABLET, EXTENDED RELEASE ORAL EVERY 12 HOURS SCHEDULED
Status: DISCONTINUED | OUTPATIENT
Start: 2017-09-25 | End: 2017-10-02 | Stop reason: HOSPADM

## 2017-09-25 RX ADMIN — HYDROCODONE BITARTRATE AND ACETAMINOPHEN 1 TABLET: 7.5; 325 TABLET ORAL at 20:03

## 2017-09-25 RX ADMIN — IPRATROPIUM BROMIDE AND ALBUTEROL SULFATE 3 ML: .5; 3 SOLUTION RESPIRATORY (INHALATION) at 05:24

## 2017-09-25 RX ADMIN — HYDROCODONE BITARTRATE AND ACETAMINOPHEN 1 TABLET: 7.5; 325 TABLET ORAL at 15:28

## 2017-09-25 RX ADMIN — TAZOBACTAM SODIUM AND PIPERACILLIN SODIUM 4.5 G: 500; 4 INJECTION, SOLUTION INTRAVENOUS at 17:36

## 2017-09-25 RX ADMIN — TAZOBACTAM SODIUM AND PIPERACILLIN SODIUM 4.5 G: 500; 4 INJECTION, SOLUTION INTRAVENOUS at 10:15

## 2017-09-25 RX ADMIN — GUAIFENESIN 600 MG: 600 TABLET, EXTENDED RELEASE ORAL at 20:04

## 2017-09-25 RX ADMIN — FLUOXETINE HYDROCHLORIDE 20 MG: 20 CAPSULE ORAL at 10:14

## 2017-09-25 RX ADMIN — GABAPENTIN 200 MG: 100 CAPSULE ORAL at 17:32

## 2017-09-25 RX ADMIN — HYDROCODONE BITARTRATE AND ACETAMINOPHEN 1 TABLET: 7.5; 325 TABLET ORAL at 10:13

## 2017-09-25 RX ADMIN — GUAIFENESIN 600 MG: 600 TABLET, EXTENDED RELEASE ORAL at 10:13

## 2017-09-25 RX ADMIN — ONDANSETRON 4 MG: 2 INJECTION INTRAMUSCULAR; INTRAVENOUS at 20:09

## 2017-09-25 RX ADMIN — APIXABAN 5 MG: 5 TABLET, FILM COATED ORAL at 19:09

## 2017-09-25 RX ADMIN — HYDROCODONE BITARTRATE AND ACETAMINOPHEN 1 TABLET: 7.5; 325 TABLET ORAL at 04:05

## 2017-09-25 RX ADMIN — ATORVASTATIN CALCIUM 40 MG: 40 TABLET, FILM COATED ORAL at 20:03

## 2017-09-25 RX ADMIN — BENZONATATE 100 MG: 100 CAPSULE ORAL at 20:04

## 2017-09-25 RX ADMIN — ONDANSETRON 4 MG: 2 INJECTION INTRAMUSCULAR; INTRAVENOUS at 18:12

## 2017-09-25 RX ADMIN — APIXABAN 5 MG: 5 TABLET, FILM COATED ORAL at 10:20

## 2017-09-26 ENCOUNTER — APPOINTMENT (OUTPATIENT)
Dept: GENERAL RADIOLOGY | Facility: HOSPITAL | Age: 82
End: 2017-09-26

## 2017-09-26 PROBLEM — J96.02 ACUTE HYPERCAPNIC RESPIRATORY FAILURE (HCC): Status: ACTIVE | Noted: 2017-09-26

## 2017-09-26 LAB
ANION GAP SERPL CALCULATED.3IONS-SCNC: 5 MMOL/L (ref 3–11)
ARTERIAL PATENCY WRIST A: ABNORMAL
ATMOSPHERIC PRESS: ABNORMAL MMHG
BASE EXCESS BLDA CALC-SCNC: 0.7 MMOL/L (ref 0–2)
BDY SITE: ABNORMAL
BUN BLD-MCNC: 17 MG/DL (ref 9–23)
BUN/CREAT SERPL: 18.9 (ref 7–25)
CALCIUM SPEC-SCNC: 9.4 MG/DL (ref 8.7–10.4)
CHLORIDE SERPL-SCNC: 104 MMOL/L (ref 99–109)
CO2 BLDA-SCNC: 28.8 MMOL/L (ref 22–33)
CO2 SERPL-SCNC: 28 MMOL/L (ref 20–31)
COHGB MFR BLD: 1.2 % (ref 0–2)
CREAT BLD-MCNC: 0.9 MG/DL (ref 0.6–1.3)
DEPRECATED RDW RBC AUTO: 55.1 FL (ref 37–54)
EPAP: 6
ERYTHROCYTE [DISTWIDTH] IN BLOOD BY AUTOMATED COUNT: 15.9 % (ref 11.3–14.5)
GFR SERPL CREATININE-BSD FRML MDRD: 60 ML/MIN/1.73
GLUCOSE BLD-MCNC: 133 MG/DL (ref 70–100)
HCO3 BLDA-SCNC: 27.2 MMOL/L (ref 20–26)
HCT VFR BLD AUTO: 35.5 % (ref 34.5–44)
HCT VFR BLD CALC: 28.1 %
HGB BLD-MCNC: 10.5 G/DL (ref 11.5–15.5)
HGB BLDA-MCNC: 9.2 G/DL (ref 14–18)
HOROWITZ INDEX BLD+IHG-RTO: 35 %
IPAP: 14
MCH RBC QN AUTO: 27.9 PG (ref 27–31)
MCHC RBC AUTO-ENTMCNC: 29.6 G/DL (ref 32–36)
MCV RBC AUTO: 94.4 FL (ref 80–99)
METHGB BLD QL: 0.9 % (ref 0–1.5)
MODALITY: ABNORMAL
OXYHGB MFR BLDV: 93.7 % (ref 94–99)
PCO2 BLDA: 52.1 MM HG (ref 35–45)
PH BLDA: 7.33 PH UNITS (ref 7.35–7.45)
PLATELET # BLD AUTO: 337 10*3/MM3 (ref 150–450)
PMV BLD AUTO: 9.4 FL (ref 6–12)
PO2 BLDA: 73.8 MM HG (ref 83–108)
POTASSIUM BLD-SCNC: 4.8 MMOL/L (ref 3.5–5.5)
RBC # BLD AUTO: 3.76 10*6/MM3 (ref 3.89–5.14)
SET MECH RESP RATE: 12
SODIUM BLD-SCNC: 137 MMOL/L (ref 132–146)
TOTAL RATE: 14 BREATHS/MINUTE
WBC NRBC COR # BLD: 9.55 10*3/MM3 (ref 3.5–10.8)

## 2017-09-26 PROCEDURE — 80048 BASIC METABOLIC PNL TOTAL CA: CPT | Performed by: INTERNAL MEDICINE

## 2017-09-26 PROCEDURE — 99233 SBSQ HOSP IP/OBS HIGH 50: CPT | Performed by: INTERNAL MEDICINE

## 2017-09-26 PROCEDURE — 36600 WITHDRAWAL OF ARTERIAL BLOOD: CPT | Performed by: INTERNAL MEDICINE

## 2017-09-26 PROCEDURE — 82805 BLOOD GASES W/O2 SATURATION: CPT | Performed by: INTERNAL MEDICINE

## 2017-09-26 PROCEDURE — 71010 HC CHEST PA OR AP: CPT

## 2017-09-26 PROCEDURE — 25010000002 FUROSEMIDE PER 20 MG: Performed by: INTERNAL MEDICINE

## 2017-09-26 PROCEDURE — 85027 COMPLETE CBC AUTOMATED: CPT | Performed by: INTERNAL MEDICINE

## 2017-09-26 PROCEDURE — 25010000002 PIPERACILLIN SOD-TAZOBACTAM PER 1 G: Performed by: NURSE PRACTITIONER

## 2017-09-26 PROCEDURE — 92526 ORAL FUNCTION THERAPY: CPT

## 2017-09-26 PROCEDURE — 25010000002 ONDANSETRON PER 1 MG: Performed by: INTERNAL MEDICINE

## 2017-09-26 PROCEDURE — 94660 CPAP INITIATION&MGMT: CPT

## 2017-09-26 PROCEDURE — 94799 UNLISTED PULMONARY SVC/PX: CPT

## 2017-09-26 RX ORDER — FUROSEMIDE 10 MG/ML
40 INJECTION INTRAMUSCULAR; INTRAVENOUS ONCE
Status: COMPLETED | OUTPATIENT
Start: 2017-09-26 | End: 2017-09-26

## 2017-09-26 RX ADMIN — TAZOBACTAM SODIUM AND PIPERACILLIN SODIUM 4.5 G: 500; 4 INJECTION, SOLUTION INTRAVENOUS at 18:26

## 2017-09-26 RX ADMIN — ATORVASTATIN CALCIUM 40 MG: 40 TABLET, FILM COATED ORAL at 20:33

## 2017-09-26 RX ADMIN — GUAIFENESIN 600 MG: 600 TABLET, EXTENDED RELEASE ORAL at 12:38

## 2017-09-26 RX ADMIN — FUROSEMIDE 40 MG: 10 INJECTION, SOLUTION INTRAMUSCULAR; INTRAVENOUS at 12:38

## 2017-09-26 RX ADMIN — APIXABAN 5 MG: 5 TABLET, FILM COATED ORAL at 12:38

## 2017-09-26 RX ADMIN — TAZOBACTAM SODIUM AND PIPERACILLIN SODIUM 4.5 G: 500; 4 INJECTION, SOLUTION INTRAVENOUS at 10:32

## 2017-09-26 RX ADMIN — BUDESONIDE 3 MG: 3 CAPSULE ORAL at 12:38

## 2017-09-26 RX ADMIN — APIXABAN 5 MG: 5 TABLET, FILM COATED ORAL at 18:26

## 2017-09-26 RX ADMIN — ONDANSETRON 4 MG: 2 INJECTION INTRAMUSCULAR; INTRAVENOUS at 20:47

## 2017-09-26 RX ADMIN — TAZOBACTAM SODIUM AND PIPERACILLIN SODIUM 4.5 G: 500; 4 INJECTION, SOLUTION INTRAVENOUS at 01:11

## 2017-09-26 RX ADMIN — GUAIFENESIN 600 MG: 600 TABLET, EXTENDED RELEASE ORAL at 20:33

## 2017-09-26 RX ADMIN — FLUOXETINE HYDROCHLORIDE 20 MG: 20 CAPSULE ORAL at 12:38

## 2017-09-27 LAB
ANION GAP SERPL CALCULATED.3IONS-SCNC: 6 MMOL/L (ref 3–11)
BACTERIA SPEC RESP CULT: NORMAL
BACTERIA SPEC RESP CULT: NORMAL
BASOPHILS # BLD AUTO: 0.08 10*3/MM3 (ref 0–0.2)
BASOPHILS NFR BLD AUTO: 0.8 % (ref 0–1)
BUN BLD-MCNC: 13 MG/DL (ref 9–23)
BUN/CREAT SERPL: 14.4 (ref 7–25)
CALCIUM SPEC-SCNC: 9 MG/DL (ref 8.7–10.4)
CHLORIDE SERPL-SCNC: 105 MMOL/L (ref 99–109)
CO2 SERPL-SCNC: 29 MMOL/L (ref 20–31)
CREAT BLD-MCNC: 0.9 MG/DL (ref 0.6–1.3)
DEPRECATED RDW RBC AUTO: 52.7 FL (ref 37–54)
EOSINOPHIL # BLD AUTO: 0.71 10*3/MM3 (ref 0–0.3)
EOSINOPHIL NFR BLD AUTO: 7.1 % (ref 0–3)
ERYTHROCYTE [DISTWIDTH] IN BLOOD BY AUTOMATED COUNT: 15.5 % (ref 11.3–14.5)
GFR SERPL CREATININE-BSD FRML MDRD: 60 ML/MIN/1.73
GLUCOSE BLD-MCNC: 114 MG/DL (ref 70–100)
GRAM STN SPEC: NORMAL
HCT VFR BLD AUTO: 32.6 % (ref 34.5–44)
HGB BLD-MCNC: 9.6 G/DL (ref 11.5–15.5)
IMM GRANULOCYTES # BLD: 0.06 10*3/MM3 (ref 0–0.03)
IMM GRANULOCYTES NFR BLD: 0.6 % (ref 0–0.6)
LYMPHOCYTES # BLD AUTO: 1.44 10*3/MM3 (ref 0.6–4.8)
LYMPHOCYTES NFR BLD AUTO: 14.3 % (ref 24–44)
MCH RBC QN AUTO: 27.4 PG (ref 27–31)
MCHC RBC AUTO-ENTMCNC: 29.4 G/DL (ref 32–36)
MCV RBC AUTO: 92.9 FL (ref 80–99)
MONOCYTES # BLD AUTO: 1.12 10*3/MM3 (ref 0–1)
MONOCYTES NFR BLD AUTO: 11.1 % (ref 0–12)
NEUTROPHILS # BLD AUTO: 6.66 10*3/MM3 (ref 1.5–8.3)
NEUTROPHILS NFR BLD AUTO: 66.1 % (ref 41–71)
PLATELET # BLD AUTO: 291 10*3/MM3 (ref 150–450)
PMV BLD AUTO: 9.4 FL (ref 6–12)
POTASSIUM BLD-SCNC: 3.3 MMOL/L (ref 3.5–5.5)
RBC # BLD AUTO: 3.51 10*6/MM3 (ref 3.89–5.14)
SODIUM BLD-SCNC: 140 MMOL/L (ref 132–146)
WBC NRBC COR # BLD: 10.07 10*3/MM3 (ref 3.5–10.8)

## 2017-09-27 PROCEDURE — 99233 SBSQ HOSP IP/OBS HIGH 50: CPT | Performed by: INTERNAL MEDICINE

## 2017-09-27 PROCEDURE — 94660 CPAP INITIATION&MGMT: CPT

## 2017-09-27 PROCEDURE — 25010000002 PIPERACILLIN SOD-TAZOBACTAM PER 1 G: Performed by: NURSE PRACTITIONER

## 2017-09-27 PROCEDURE — 97110 THERAPEUTIC EXERCISES: CPT

## 2017-09-27 PROCEDURE — 85025 COMPLETE CBC W/AUTO DIFF WBC: CPT | Performed by: INTERNAL MEDICINE

## 2017-09-27 PROCEDURE — 80048 BASIC METABOLIC PNL TOTAL CA: CPT | Performed by: INTERNAL MEDICINE

## 2017-09-27 PROCEDURE — 97116 GAIT TRAINING THERAPY: CPT

## 2017-09-27 PROCEDURE — 25010000002 ONDANSETRON PER 1 MG: Performed by: INTERNAL MEDICINE

## 2017-09-27 PROCEDURE — 94799 UNLISTED PULMONARY SVC/PX: CPT

## 2017-09-27 PROCEDURE — 25010000002 FUROSEMIDE PER 20 MG: Performed by: INTERNAL MEDICINE

## 2017-09-27 RX ORDER — FUROSEMIDE 10 MG/ML
40 INJECTION INTRAMUSCULAR; INTRAVENOUS DAILY
Status: DISCONTINUED | OUTPATIENT
Start: 2017-09-27 | End: 2017-09-28

## 2017-09-27 RX ORDER — POTASSIUM CHLORIDE 1.5 G/1.77G
40 POWDER, FOR SOLUTION ORAL AS NEEDED
Status: DISCONTINUED | OUTPATIENT
Start: 2017-09-27 | End: 2017-10-02 | Stop reason: HOSPADM

## 2017-09-27 RX ORDER — POTASSIUM CHLORIDE 750 MG/1
40 CAPSULE, EXTENDED RELEASE ORAL AS NEEDED
Status: DISCONTINUED | OUTPATIENT
Start: 2017-09-27 | End: 2017-10-02 | Stop reason: HOSPADM

## 2017-09-27 RX ADMIN — APIXABAN 5 MG: 5 TABLET, FILM COATED ORAL at 09:42

## 2017-09-27 RX ADMIN — ASPIRIN 81 MG: 81 TABLET, COATED ORAL at 06:21

## 2017-09-27 RX ADMIN — TAZOBACTAM SODIUM AND PIPERACILLIN SODIUM 4.5 G: 500; 4 INJECTION, SOLUTION INTRAVENOUS at 09:42

## 2017-09-27 RX ADMIN — ONDANSETRON 4 MG: 2 INJECTION INTRAMUSCULAR; INTRAVENOUS at 10:10

## 2017-09-27 RX ADMIN — PANTOPRAZOLE SODIUM 40 MG: 40 TABLET, DELAYED RELEASE ORAL at 06:21

## 2017-09-27 RX ADMIN — ATORVASTATIN CALCIUM 40 MG: 40 TABLET, FILM COATED ORAL at 20:38

## 2017-09-27 RX ADMIN — GUAIFENESIN 600 MG: 600 TABLET, EXTENDED RELEASE ORAL at 09:42

## 2017-09-27 RX ADMIN — APIXABAN 5 MG: 5 TABLET, FILM COATED ORAL at 18:48

## 2017-09-27 RX ADMIN — TAZOBACTAM SODIUM AND PIPERACILLIN SODIUM 4.5 G: 500; 4 INJECTION, SOLUTION INTRAVENOUS at 02:05

## 2017-09-27 RX ADMIN — DOCUSATE SODIUM 100 MG: 100 CAPSULE, LIQUID FILLED ORAL at 18:48

## 2017-09-27 RX ADMIN — GUAIFENESIN 600 MG: 600 TABLET, EXTENDED RELEASE ORAL at 20:38

## 2017-09-27 RX ADMIN — FLUOXETINE HYDROCHLORIDE 20 MG: 20 CAPSULE ORAL at 09:42

## 2017-09-27 RX ADMIN — DOCUSATE SODIUM 100 MG: 100 CAPSULE, LIQUID FILLED ORAL at 09:42

## 2017-09-27 RX ADMIN — FUROSEMIDE 40 MG: 10 INJECTION, SOLUTION INTRAMUSCULAR; INTRAVENOUS at 10:10

## 2017-09-27 RX ADMIN — TAZOBACTAM SODIUM AND PIPERACILLIN SODIUM 4.5 G: 500; 4 INJECTION, SOLUTION INTRAVENOUS at 18:48

## 2017-09-28 PROBLEM — D72.829 LEUKOCYTOSIS: Status: RESOLVED | Noted: 2017-09-24 | Resolved: 2017-09-28

## 2017-09-28 PROBLEM — R55 SYNCOPE: Status: RESOLVED | Noted: 2017-09-23 | Resolved: 2017-09-28

## 2017-09-28 LAB
ANION GAP SERPL CALCULATED.3IONS-SCNC: 11 MMOL/L (ref 3–11)
BACTERIA SPEC AEROBE CULT: NORMAL
BACTERIA SPEC AEROBE CULT: NORMAL
BASOPHILS # BLD AUTO: 0.06 10*3/MM3 (ref 0–0.2)
BASOPHILS NFR BLD AUTO: 0.6 % (ref 0–1)
BUN BLD-MCNC: 13 MG/DL (ref 9–23)
BUN/CREAT SERPL: 13 (ref 7–25)
CALCIUM SPEC-SCNC: 8.5 MG/DL (ref 8.7–10.4)
CHLORIDE SERPL-SCNC: 102 MMOL/L (ref 99–109)
CO2 SERPL-SCNC: 27 MMOL/L (ref 20–31)
CREAT BLD-MCNC: 1 MG/DL (ref 0.6–1.3)
DEPRECATED RDW RBC AUTO: 50.8 FL (ref 37–54)
EOSINOPHIL # BLD AUTO: 0.77 10*3/MM3 (ref 0–0.3)
EOSINOPHIL NFR BLD AUTO: 7.5 % (ref 0–3)
ERYTHROCYTE [DISTWIDTH] IN BLOOD BY AUTOMATED COUNT: 15.1 % (ref 11.3–14.5)
GFR SERPL CREATININE-BSD FRML MDRD: 53 ML/MIN/1.73
GLUCOSE BLD-MCNC: 112 MG/DL (ref 70–100)
HCT VFR BLD AUTO: 32.7 % (ref 34.5–44)
HGB BLD-MCNC: 9.7 G/DL (ref 11.5–15.5)
IMM GRANULOCYTES # BLD: 0.07 10*3/MM3 (ref 0–0.03)
IMM GRANULOCYTES NFR BLD: 0.7 % (ref 0–0.6)
LYMPHOCYTES # BLD AUTO: 1.41 10*3/MM3 (ref 0.6–4.8)
LYMPHOCYTES NFR BLD AUTO: 13.7 % (ref 24–44)
MAGNESIUM SERPL-MCNC: 1.5 MG/DL (ref 1.3–2.7)
MCH RBC QN AUTO: 27.3 PG (ref 27–31)
MCHC RBC AUTO-ENTMCNC: 29.7 G/DL (ref 32–36)
MCV RBC AUTO: 92.1 FL (ref 80–99)
MONOCYTES # BLD AUTO: 1.1 10*3/MM3 (ref 0–1)
MONOCYTES NFR BLD AUTO: 10.7 % (ref 0–12)
NEUTROPHILS # BLD AUTO: 6.87 10*3/MM3 (ref 1.5–8.3)
NEUTROPHILS NFR BLD AUTO: 66.8 % (ref 41–71)
PLATELET # BLD AUTO: 320 10*3/MM3 (ref 150–450)
PMV BLD AUTO: 9.4 FL (ref 6–12)
POTASSIUM BLD-SCNC: 3.1 MMOL/L (ref 3.5–5.5)
RBC # BLD AUTO: 3.55 10*6/MM3 (ref 3.89–5.14)
SODIUM BLD-SCNC: 140 MMOL/L (ref 132–146)
WBC NRBC COR # BLD: 10.28 10*3/MM3 (ref 3.5–10.8)

## 2017-09-28 PROCEDURE — 25010000002 PIPERACILLIN SOD-TAZOBACTAM PER 1 G: Performed by: NURSE PRACTITIONER

## 2017-09-28 PROCEDURE — 80048 BASIC METABOLIC PNL TOTAL CA: CPT | Performed by: INTERNAL MEDICINE

## 2017-09-28 PROCEDURE — 85025 COMPLETE CBC W/AUTO DIFF WBC: CPT | Performed by: INTERNAL MEDICINE

## 2017-09-28 PROCEDURE — 83735 ASSAY OF MAGNESIUM: CPT | Performed by: HOSPITALIST

## 2017-09-28 PROCEDURE — 25010000002 CEFTRIAXONE PER 250 MG: Performed by: HOSPITALIST

## 2017-09-28 PROCEDURE — 94799 UNLISTED PULMONARY SVC/PX: CPT

## 2017-09-28 PROCEDURE — 25010000002 FUROSEMIDE PER 20 MG: Performed by: INTERNAL MEDICINE

## 2017-09-28 PROCEDURE — 97116 GAIT TRAINING THERAPY: CPT

## 2017-09-28 PROCEDURE — 99232 SBSQ HOSP IP/OBS MODERATE 35: CPT | Performed by: HOSPITALIST

## 2017-09-28 PROCEDURE — 97110 THERAPEUTIC EXERCISES: CPT

## 2017-09-28 PROCEDURE — 25010000002 ONDANSETRON PER 1 MG: Performed by: INTERNAL MEDICINE

## 2017-09-28 RX ORDER — CEFTRIAXONE SODIUM 1 G/50ML
1 INJECTION, SOLUTION INTRAVENOUS EVERY 24 HOURS
Status: DISCONTINUED | OUTPATIENT
Start: 2017-09-28 | End: 2017-09-30

## 2017-09-28 RX ORDER — MAGNESIUM SULFATE HEPTAHYDRATE 40 MG/ML
4 INJECTION, SOLUTION INTRAVENOUS AS NEEDED
Status: DISCONTINUED | OUTPATIENT
Start: 2017-09-28 | End: 2017-10-02 | Stop reason: HOSPADM

## 2017-09-28 RX ORDER — LEVOFLOXACIN 750 MG/1
750 TABLET ORAL EVERY 24 HOURS
Status: DISCONTINUED | OUTPATIENT
Start: 2017-09-28 | End: 2017-10-02 | Stop reason: HOSPADM

## 2017-09-28 RX ORDER — MAGNESIUM SULFATE HEPTAHYDRATE 40 MG/ML
2 INJECTION, SOLUTION INTRAVENOUS AS NEEDED
Status: DISCONTINUED | OUTPATIENT
Start: 2017-09-28 | End: 2017-10-02 | Stop reason: HOSPADM

## 2017-09-28 RX ADMIN — CEFTRIAXONE SODIUM 1 G: 1 INJECTION, SOLUTION INTRAVENOUS at 15:50

## 2017-09-28 RX ADMIN — PANTOPRAZOLE SODIUM 40 MG: 40 TABLET, DELAYED RELEASE ORAL at 06:16

## 2017-09-28 RX ADMIN — ACETAMINOPHEN 650 MG: 325 TABLET, FILM COATED ORAL at 20:47

## 2017-09-28 RX ADMIN — FLUOXETINE HYDROCHLORIDE 20 MG: 20 CAPSULE ORAL at 09:29

## 2017-09-28 RX ADMIN — TAZOBACTAM SODIUM AND PIPERACILLIN SODIUM 4.5 G: 500; 4 INJECTION, SOLUTION INTRAVENOUS at 09:28

## 2017-09-28 RX ADMIN — ASPIRIN 81 MG: 81 TABLET, COATED ORAL at 06:16

## 2017-09-28 RX ADMIN — DOCUSATE SODIUM 100 MG: 100 CAPSULE, LIQUID FILLED ORAL at 09:28

## 2017-09-28 RX ADMIN — GUAIFENESIN 600 MG: 600 TABLET, EXTENDED RELEASE ORAL at 09:28

## 2017-09-28 RX ADMIN — APIXABAN 5 MG: 5 TABLET, FILM COATED ORAL at 17:50

## 2017-09-28 RX ADMIN — POTASSIUM CHLORIDE 40 MEQ: 750 CAPSULE, EXTENDED RELEASE ORAL at 20:47

## 2017-09-28 RX ADMIN — LEVOFLOXACIN 750 MG: 750 TABLET, FILM COATED ORAL at 15:51

## 2017-09-28 RX ADMIN — ONDANSETRON 4 MG: 2 INJECTION INTRAMUSCULAR; INTRAVENOUS at 17:55

## 2017-09-28 RX ADMIN — POTASSIUM CHLORIDE 40 MEQ: 1.5 POWDER, FOR SOLUTION ORAL at 06:17

## 2017-09-28 RX ADMIN — POLYETHYLENE GLYCOL 3350 17 G: 17 POWDER, FOR SOLUTION ORAL at 09:29

## 2017-09-28 RX ADMIN — APIXABAN 5 MG: 5 TABLET, FILM COATED ORAL at 09:29

## 2017-09-28 RX ADMIN — ATORVASTATIN CALCIUM 40 MG: 40 TABLET, FILM COATED ORAL at 20:47

## 2017-09-28 RX ADMIN — POTASSIUM CHLORIDE 40 MEQ: 750 CAPSULE, EXTENDED RELEASE ORAL at 11:04

## 2017-09-28 RX ADMIN — ACETAMINOPHEN 650 MG: 325 TABLET, FILM COATED ORAL at 01:57

## 2017-09-28 RX ADMIN — BUDESONIDE 3 MG: 3 CAPSULE ORAL at 09:28

## 2017-09-28 RX ADMIN — FUROSEMIDE 40 MG: 10 INJECTION, SOLUTION INTRAMUSCULAR; INTRAVENOUS at 09:29

## 2017-09-28 RX ADMIN — DOCUSATE SODIUM 100 MG: 100 CAPSULE, LIQUID FILLED ORAL at 17:50

## 2017-09-28 RX ADMIN — TAZOBACTAM SODIUM AND PIPERACILLIN SODIUM 4.5 G: 500; 4 INJECTION, SOLUTION INTRAVENOUS at 01:57

## 2017-09-28 RX ADMIN — GUAIFENESIN 600 MG: 600 TABLET, EXTENDED RELEASE ORAL at 20:47

## 2017-09-29 LAB
ALBUMIN SERPL-MCNC: 3.2 G/DL (ref 3.2–4.8)
ANION GAP SERPL CALCULATED.3IONS-SCNC: 7 MMOL/L (ref 3–11)
BUN BLD-MCNC: 15 MG/DL (ref 9–23)
BUN/CREAT SERPL: 18.8 (ref 7–25)
CALCIUM SPEC-SCNC: 8.5 MG/DL (ref 8.7–10.4)
CHLORIDE SERPL-SCNC: 103 MMOL/L (ref 99–109)
CO2 SERPL-SCNC: 32 MMOL/L (ref 20–31)
CREAT BLD-MCNC: 0.8 MG/DL (ref 0.6–1.3)
DEPRECATED RDW RBC AUTO: 50.2 FL (ref 37–54)
ERYTHROCYTE [DISTWIDTH] IN BLOOD BY AUTOMATED COUNT: 15.1 % (ref 11.3–14.5)
GFR SERPL CREATININE-BSD FRML MDRD: 69 ML/MIN/1.73
GLUCOSE BLD-MCNC: 102 MG/DL (ref 70–100)
HCT VFR BLD AUTO: 33.3 % (ref 34.5–44)
HGB BLD-MCNC: 9.9 G/DL (ref 11.5–15.5)
MCH RBC QN AUTO: 26.9 PG (ref 27–31)
MCHC RBC AUTO-ENTMCNC: 29.7 G/DL (ref 32–36)
MCV RBC AUTO: 90.5 FL (ref 80–99)
PHOSPHATE SERPL-MCNC: 2.5 MG/DL (ref 2.4–5.1)
PLATELET # BLD AUTO: 328 10*3/MM3 (ref 150–450)
PMV BLD AUTO: 9.5 FL (ref 6–12)
POTASSIUM BLD-SCNC: 3.8 MMOL/L (ref 3.5–5.5)
RBC # BLD AUTO: 3.68 10*6/MM3 (ref 3.89–5.14)
SODIUM BLD-SCNC: 142 MMOL/L (ref 132–146)
WBC NRBC COR # BLD: 9.51 10*3/MM3 (ref 3.5–10.8)

## 2017-09-29 PROCEDURE — 94799 UNLISTED PULMONARY SVC/PX: CPT

## 2017-09-29 PROCEDURE — 25010000002 CEFTRIAXONE PER 250 MG: Performed by: HOSPITALIST

## 2017-09-29 PROCEDURE — 99232 SBSQ HOSP IP/OBS MODERATE 35: CPT | Performed by: HOSPITALIST

## 2017-09-29 PROCEDURE — 85027 COMPLETE CBC AUTOMATED: CPT | Performed by: HOSPITALIST

## 2017-09-29 PROCEDURE — 80069 RENAL FUNCTION PANEL: CPT | Performed by: HOSPITALIST

## 2017-09-29 RX ORDER — BUSPIRONE HYDROCHLORIDE 5 MG/1
5 TABLET ORAL EVERY 12 HOURS PRN
Status: DISCONTINUED | OUTPATIENT
Start: 2017-09-29 | End: 2017-10-02 | Stop reason: HOSPADM

## 2017-09-29 RX ADMIN — GUAIFENESIN 600 MG: 600 TABLET, EXTENDED RELEASE ORAL at 08:35

## 2017-09-29 RX ADMIN — APIXABAN 5 MG: 5 TABLET, FILM COATED ORAL at 08:35

## 2017-09-29 RX ADMIN — DOCUSATE SODIUM 100 MG: 100 CAPSULE, LIQUID FILLED ORAL at 08:35

## 2017-09-29 RX ADMIN — ATORVASTATIN CALCIUM 40 MG: 40 TABLET, FILM COATED ORAL at 21:26

## 2017-09-29 RX ADMIN — DOCUSATE SODIUM 100 MG: 100 CAPSULE, LIQUID FILLED ORAL at 17:34

## 2017-09-29 RX ADMIN — BUSPIRONE HYDROCHLORIDE 5 MG: 5 TABLET ORAL at 14:02

## 2017-09-29 RX ADMIN — LEVOFLOXACIN 750 MG: 750 TABLET, FILM COATED ORAL at 15:44

## 2017-09-29 RX ADMIN — FLUOXETINE HYDROCHLORIDE 20 MG: 20 CAPSULE ORAL at 08:35

## 2017-09-29 RX ADMIN — PANTOPRAZOLE SODIUM 40 MG: 40 TABLET, DELAYED RELEASE ORAL at 06:04

## 2017-09-29 RX ADMIN — GUAIFENESIN 600 MG: 600 TABLET, EXTENDED RELEASE ORAL at 21:26

## 2017-09-29 RX ADMIN — ASPIRIN 81 MG: 81 TABLET, COATED ORAL at 06:04

## 2017-09-29 RX ADMIN — CEFTRIAXONE SODIUM 1 G: 1 INJECTION, SOLUTION INTRAVENOUS at 15:44

## 2017-09-29 RX ADMIN — APIXABAN 5 MG: 5 TABLET, FILM COATED ORAL at 17:34

## 2017-09-30 LAB
ALBUMIN SERPL-MCNC: 3 G/DL (ref 3.2–4.8)
ANION GAP SERPL CALCULATED.3IONS-SCNC: 8 MMOL/L (ref 3–11)
BUN BLD-MCNC: 15 MG/DL (ref 9–23)
BUN/CREAT SERPL: 21.4 (ref 7–25)
CALCIUM SPEC-SCNC: 8.4 MG/DL (ref 8.7–10.4)
CHLORIDE SERPL-SCNC: 102 MMOL/L (ref 99–109)
CO2 SERPL-SCNC: 30 MMOL/L (ref 20–31)
CREAT BLD-MCNC: 0.7 MG/DL (ref 0.6–1.3)
DEPRECATED RDW RBC AUTO: 52.7 FL (ref 37–54)
ERYTHROCYTE [DISTWIDTH] IN BLOOD BY AUTOMATED COUNT: 15.6 % (ref 11.3–14.5)
GFR SERPL CREATININE-BSD FRML MDRD: 80 ML/MIN/1.73
GLUCOSE BLD-MCNC: 101 MG/DL (ref 70–100)
HCT VFR BLD AUTO: 33.8 % (ref 34.5–44)
HGB BLD-MCNC: 9.9 G/DL (ref 11.5–15.5)
MAGNESIUM SERPL-MCNC: 1.3 MG/DL (ref 1.3–2.7)
MCH RBC QN AUTO: 27 PG (ref 27–31)
MCHC RBC AUTO-ENTMCNC: 29.3 G/DL (ref 32–36)
MCV RBC AUTO: 92.3 FL (ref 80–99)
PHOSPHATE SERPL-MCNC: 2.2 MG/DL (ref 2.4–5.1)
PLATELET # BLD AUTO: 292 10*3/MM3 (ref 150–450)
PMV BLD AUTO: 9.4 FL (ref 6–12)
POTASSIUM BLD-SCNC: 3.9 MMOL/L (ref 3.5–5.5)
RBC # BLD AUTO: 3.66 10*6/MM3 (ref 3.89–5.14)
SODIUM BLD-SCNC: 140 MMOL/L (ref 132–146)
WBC NRBC COR # BLD: 10.21 10*3/MM3 (ref 3.5–10.8)

## 2017-09-30 PROCEDURE — 83735 ASSAY OF MAGNESIUM: CPT | Performed by: HOSPITALIST

## 2017-09-30 PROCEDURE — 25010000002 ONDANSETRON PER 1 MG: Performed by: INTERNAL MEDICINE

## 2017-09-30 PROCEDURE — 97110 THERAPEUTIC EXERCISES: CPT

## 2017-09-30 PROCEDURE — 80069 RENAL FUNCTION PANEL: CPT | Performed by: HOSPITALIST

## 2017-09-30 PROCEDURE — 25010000002 MAGNESIUM SULFATE 2 GM/50ML SOLUTION: Performed by: HOSPITALIST

## 2017-09-30 PROCEDURE — 99232 SBSQ HOSP IP/OBS MODERATE 35: CPT | Performed by: HOSPITALIST

## 2017-09-30 PROCEDURE — 94660 CPAP INITIATION&MGMT: CPT

## 2017-09-30 PROCEDURE — 94799 UNLISTED PULMONARY SVC/PX: CPT

## 2017-09-30 PROCEDURE — 85027 COMPLETE CBC AUTOMATED: CPT | Performed by: HOSPITALIST

## 2017-09-30 RX ADMIN — PANTOPRAZOLE SODIUM 40 MG: 40 TABLET, DELAYED RELEASE ORAL at 06:05

## 2017-09-30 RX ADMIN — LEVOFLOXACIN 750 MG: 750 TABLET, FILM COATED ORAL at 15:53

## 2017-09-30 RX ADMIN — APIXABAN 5 MG: 5 TABLET, FILM COATED ORAL at 18:16

## 2017-09-30 RX ADMIN — APIXABAN 5 MG: 5 TABLET, FILM COATED ORAL at 08:48

## 2017-09-30 RX ADMIN — ATORVASTATIN CALCIUM 40 MG: 40 TABLET, FILM COATED ORAL at 20:05

## 2017-09-30 RX ADMIN — MAGNESIUM SULFATE HEPTAHYDRATE 2 G: 40 INJECTION, SOLUTION INTRAVENOUS at 21:59

## 2017-09-30 RX ADMIN — BUSPIRONE HYDROCHLORIDE 5 MG: 5 TABLET ORAL at 15:46

## 2017-09-30 RX ADMIN — ONDANSETRON 4 MG: 2 INJECTION INTRAMUSCULAR; INTRAVENOUS at 15:46

## 2017-09-30 RX ADMIN — ASPIRIN 81 MG: 81 TABLET, COATED ORAL at 06:05

## 2017-09-30 RX ADMIN — BUDESONIDE 3 MG: 3 CAPSULE ORAL at 08:47

## 2017-09-30 RX ADMIN — GUAIFENESIN 600 MG: 600 TABLET, EXTENDED RELEASE ORAL at 20:05

## 2017-09-30 RX ADMIN — GUAIFENESIN 600 MG: 600 TABLET, EXTENDED RELEASE ORAL at 08:52

## 2017-09-30 RX ADMIN — FLUOXETINE HYDROCHLORIDE 20 MG: 20 CAPSULE ORAL at 08:48

## 2017-09-30 RX ADMIN — MAGNESIUM SULFATE HEPTAHYDRATE 2 G: 40 INJECTION, SOLUTION INTRAVENOUS at 19:29

## 2017-10-01 LAB
ALBUMIN SERPL-MCNC: 3.1 G/DL (ref 3.2–4.8)
ANION GAP SERPL CALCULATED.3IONS-SCNC: 6 MMOL/L (ref 3–11)
BUN BLD-MCNC: 13 MG/DL (ref 9–23)
BUN/CREAT SERPL: 18.6 (ref 7–25)
CALCIUM SPEC-SCNC: 8.4 MG/DL (ref 8.7–10.4)
CHLORIDE SERPL-SCNC: 101 MMOL/L (ref 99–109)
CO2 SERPL-SCNC: 34 MMOL/L (ref 20–31)
CREAT BLD-MCNC: 0.7 MG/DL (ref 0.6–1.3)
DEPRECATED RDW RBC AUTO: 52.6 FL (ref 37–54)
ERYTHROCYTE [DISTWIDTH] IN BLOOD BY AUTOMATED COUNT: 15.5 % (ref 11.3–14.5)
GFR SERPL CREATININE-BSD FRML MDRD: 80 ML/MIN/1.73
GLUCOSE BLD-MCNC: 120 MG/DL (ref 70–100)
HCT VFR BLD AUTO: 32.9 % (ref 34.5–44)
HGB BLD-MCNC: 9.7 G/DL (ref 11.5–15.5)
MAGNESIUM SERPL-MCNC: 3.3 MG/DL (ref 1.3–2.7)
MCH RBC QN AUTO: 27.2 PG (ref 27–31)
MCHC RBC AUTO-ENTMCNC: 29.5 G/DL (ref 32–36)
MCV RBC AUTO: 92.2 FL (ref 80–99)
PHOSPHATE SERPL-MCNC: 3 MG/DL (ref 2.4–5.1)
PLATELET # BLD AUTO: 316 10*3/MM3 (ref 150–450)
PMV BLD AUTO: 9.6 FL (ref 6–12)
POTASSIUM BLD-SCNC: 3.5 MMOL/L (ref 3.5–5.5)
RBC # BLD AUTO: 3.57 10*6/MM3 (ref 3.89–5.14)
SODIUM BLD-SCNC: 141 MMOL/L (ref 132–146)
WBC NRBC COR # BLD: 10.26 10*3/MM3 (ref 3.5–10.8)

## 2017-10-01 PROCEDURE — 90674 CCIIV4 VAC NO PRSV 0.5 ML IM: CPT | Performed by: HOSPITALIST

## 2017-10-01 PROCEDURE — 83735 ASSAY OF MAGNESIUM: CPT | Performed by: HOSPITALIST

## 2017-10-01 PROCEDURE — 25010000002 INFLUENZA VAC SPLIT QUAD 0.5 ML SUSPENSION PREFILLED SYRINGE: Performed by: HOSPITALIST

## 2017-10-01 PROCEDURE — 94799 UNLISTED PULMONARY SVC/PX: CPT

## 2017-10-01 PROCEDURE — 99231 SBSQ HOSP IP/OBS SF/LOW 25: CPT | Performed by: PHYSICIAN ASSISTANT

## 2017-10-01 PROCEDURE — 80069 RENAL FUNCTION PANEL: CPT | Performed by: HOSPITALIST

## 2017-10-01 PROCEDURE — G0008 ADMIN INFLUENZA VIRUS VAC: HCPCS | Performed by: HOSPITALIST

## 2017-10-01 PROCEDURE — 85027 COMPLETE CBC AUTOMATED: CPT | Performed by: HOSPITALIST

## 2017-10-01 PROCEDURE — 25010000002 MAGNESIUM SULFATE 2 GM/50ML SOLUTION: Performed by: HOSPITALIST

## 2017-10-01 RX ORDER — OXYMETAZOLINE HYDROCHLORIDE 0.05 G/100ML
2 SPRAY NASAL 2 TIMES DAILY PRN
Status: DISCONTINUED | OUTPATIENT
Start: 2017-10-01 | End: 2017-10-02 | Stop reason: HOSPADM

## 2017-10-01 RX ORDER — FLUTICASONE PROPIONATE 50 MCG
1 SPRAY, SUSPENSION (ML) NASAL EVERY 12 HOURS SCHEDULED
Status: DISCONTINUED | OUTPATIENT
Start: 2017-10-01 | End: 2017-10-02 | Stop reason: HOSPADM

## 2017-10-01 RX ADMIN — GUAIFENESIN 600 MG: 600 TABLET, EXTENDED RELEASE ORAL at 20:31

## 2017-10-01 RX ADMIN — ATORVASTATIN CALCIUM 40 MG: 40 TABLET, FILM COATED ORAL at 20:31

## 2017-10-01 RX ADMIN — MAGNESIUM SULFATE HEPTAHYDRATE 2 G: 40 INJECTION, SOLUTION INTRAVENOUS at 01:04

## 2017-10-01 RX ADMIN — GUAIFENESIN 600 MG: 600 TABLET, EXTENDED RELEASE ORAL at 08:41

## 2017-10-01 RX ADMIN — BUDESONIDE 3 MG: 3 CAPSULE ORAL at 08:42

## 2017-10-01 RX ADMIN — LEVOFLOXACIN 750 MG: 750 TABLET, FILM COATED ORAL at 17:22

## 2017-10-01 RX ADMIN — APIXABAN 5 MG: 5 TABLET, FILM COATED ORAL at 17:22

## 2017-10-01 RX ADMIN — APIXABAN 5 MG: 5 TABLET, FILM COATED ORAL at 08:42

## 2017-10-01 RX ADMIN — ASPIRIN 81 MG: 81 TABLET, COATED ORAL at 06:40

## 2017-10-01 RX ADMIN — DOCUSATE SODIUM 100 MG: 100 CAPSULE, LIQUID FILLED ORAL at 08:42

## 2017-10-01 RX ADMIN — FLUOXETINE HYDROCHLORIDE 20 MG: 20 CAPSULE ORAL at 08:42

## 2017-10-01 RX ADMIN — OXYMETAZOLINE HYDROCHLORIDE 2 SPRAY: 5 SPRAY NASAL at 13:56

## 2017-10-01 RX ADMIN — PANTOPRAZOLE SODIUM 40 MG: 40 TABLET, DELAYED RELEASE ORAL at 06:40

## 2017-10-01 RX ADMIN — DOCUSATE SODIUM 100 MG: 100 CAPSULE, LIQUID FILLED ORAL at 17:22

## 2017-10-01 RX ADMIN — INFLUENZA VIRUS VACCINE 0.5 ML: 15; 15; 15; 15 SUSPENSION INTRAMUSCULAR at 13:57

## 2017-10-01 NOTE — PLAN OF CARE
Problem: Patient Care Overview (Adult)  Goal: Plan of Care Review  Outcome: Ongoing (interventions implemented as appropriate)    09/30/17 1149 09/30/17 2000   Coping/Psychosocial Response Interventions   Plan Of Care Reviewed With --  patient   Patient Care Overview   Progress progress towards functional goals is fair --        Goal: Adult Individualization and Mutuality  Outcome: Ongoing (interventions implemented as appropriate)    09/30/17 0618   Individualization   Patient Specific Goals O2 saturations stay above 90% on 1-2L O2   Patient Specific Interventions continuous monitor O2 sats       Goal: Discharge Needs Assessment  Outcome: Ongoing (interventions implemented as appropriate)    Problem: Cardiac Rhythm Management Device (Adult)  Goal: Signs and Symptoms of Listed Potential Problems Will be Absent or Manageable (Cardiac Rhythm Management Device)  Outcome: Ongoing (interventions implemented as appropriate)    09/30/17 0618   Cardiac Rhythm Management Device   Problems Assessed (Cardiac Rhythm Management Device) all   Problems Present (Cardiac Rhythm Management Device) situational response         Problem: Fall Risk (Adult)  Goal: Identify Related Risk Factors and Signs and Symptoms  Outcome: Ongoing (interventions implemented as appropriate)    09/27/17 0457   Fall Risk   Fall Risk: Related Risk Factors age-related changes;confusion/agitation;fatigue/slow reaction   Fall Risk: Signs and Symptoms presence of risk factors       Goal: Absence of Falls  Outcome: Ongoing (interventions implemented as appropriate)    10/01/17 0457   Fall Risk (Adult)   Absence of Falls making progress toward outcome         Problem: Older Inpatient, Acutely Ill (Adult)  Goal: Signs and Symptoms of Listed Potential Problems Will be Absent or Manageable (Older Inpatient, Acutely Ill)  Outcome: Ongoing (interventions implemented as appropriate)    09/30/17 0618   Older Adult Inpatient, Acutely Ill   Problems Assessed (Acutely Ill  Older Adult) all   Problems Present (Acutely Ill Older Adult) situational response         Problem: Pressure Ulcer Risk (Tang Scale) (Adult,Obstetrics,Pediatric)  Goal: Identify Related Risk Factors and Signs and Symptoms  Outcome: Ongoing (interventions implemented as appropriate)    09/29/17 0449   Pressure Ulcer Risk (Tang Scale)   Related Risk Factors (Pressure Ulcer Risk (Tang Scale)) age extremes;body weight extremes;tissue perfusion altered       Goal: Skin Integrity  Outcome: Ongoing (interventions implemented as appropriate)

## 2017-10-01 NOTE — PLAN OF CARE
Problem: Pressure Ulcer Risk (Tang Scale) (Adult,Obstetrics,Pediatric)  Intervention: Prevent/Manage Excess Moisture    10/01/17 1445   Hygiene Care Assistance   Perineal Care cleansed;absorbent pad changed;perianal area cleansed;skin sealant/barrier applied;protective ointment applied   Hygiene Care   Bathing/Skin Care bath, complete;incontinence care;linen changed   Skin Interventions   Skin Protection incontinence pads utilized;skin sealant/moisture barrier applied

## 2017-10-01 NOTE — PROGRESS NOTES
HOSPITALIST DAILY PROGRESS NOTE    Chief Complaint: f/u respiratory distress    SUBJECTIVE/OVERNIGHT EVENTS   Upright in bed for lunch. Family at bedside. No complaints currently.   Used BiPAP overnight and slept well. Used it again this morning due to lethargy and hypoxia.   Saturating >90% on room air when examined.   Intermittently anxious. Buspar helps.     Review of Systems:  General - No fevers, no chills  CVS - No chest pain, no palpitations  Resp - No cough, (+) dyspnea  GI - No N/V/D, no abd pain    OBJECTIVE   I have reviewed the vital signs.    Temp:  [98.5 °F (36.9 °C)-98.8 °F (37.1 °C)] 98.8 °F (37.1 °C)  Heart Rate:  [81-92] 81  Resp:  [16-18] 18  BP: (145-153)/() 145/82    Physical Exam  Constitutional: No acute distress, awake, alert, upright in bed.   HENT: NCAT, mucous membranes moist  Respiratory: Clear to auscultation bilaterally, respiratory effort normal   Cardiovascular: RRR, no murmurs, rubs, or gallops, palpable pedal pulses bilaterally  Gastrointestinal: Positive bowel sounds, soft, nontender, nondistended  Musculoskeletal: Mild non-pitting BLE edema  Psychiatric: Oriented x 3, appropriate affect, cooperative  Neurologic: Strength symmetric in all extremities, CN grossly intact to confrontation, speech is clear  Skin: No rashes    Results:  I have reviewed the labs, culture data, radiology results, and diagnostic studies.    Results from last 7 days  Lab Units 10/01/17  0554 09/30/17  0625 09/29/17  0531   WBC 10*3/mm3 10.26 10.21 9.51   HEMOGLOBIN g/dL 9.7* 9.9* 9.9*   HEMATOCRIT % 32.9* 33.8* 33.3*   PLATELETS 10*3/mm3 316 292 328     Results from last 7 days  Lab Units 10/01/17  0554 09/30/17  0625 09/29/17  0531   SODIUM mmol/L 141 140 142   POTASSIUM mmol/L 3.5 3.9 3.8   CHLORIDE mmol/L 101 102 103   CO2 mmol/L 34.0* 30.0 32.0*   BUN mg/dL 13 15 15   CREATININE mg/dL 0.70 0.70 0.80   GLUCOSE mg/dL 120* 101* 102*   CALCIUM mg/dL 8.4* 8.4* 8.5*     Culture Data:  Blood  Culture   Date Value Ref Range Status   09/23/2017 No growth at 4 days  Preliminary   09/23/2017 No growth at 4 days  Preliminary     Urine Culture   Date Value Ref Range Status   09/23/2017 No growth at 2 days  Final     Respiratory Culture   Date Value Ref Range Status   09/25/2017 Moderate growth (3+) Normal Respiratory Kristan  Final     I have reviewed the medications.    apixaban 5 mg Oral BID   aspirin 81 mg Oral QAM   atorvastatin 40 mg Oral Nightly   Budesonide 3 mg Oral Once per day on Sun Tue Thu Sat   docusate sodium 100 mg Oral BID   FLUoxetine 20 mg Oral Daily   guaiFENesin 600 mg Oral Q12H   levoFLOXacin 750 mg Oral Q24H   pantoprazole 40 mg Oral QAM   pharmacy consult - MTM  Does not apply Daily   polyethylene glycol 17 g Oral Daily     ASSESSMENT/PLAN    Principal Problem:    Acute hypercapnic respiratory failure  Active Problems:    Benign essential HTN    Dyslipidemia    PFO (patent foramen ovale)    Coronary artery disease    Pulmonary embolism    Cellulitis    Lactic acidosis    Respiratory acidosis    # Acute Hypercapnic Hypoxic Respiratory Failure: Resolved  - She has completed treatment for PNA  - RA when awake, NC O2/BiPAP nocturnal PRN (De-sats when asleep)  - Resp Cx normal respiratory kristan  - ECHO 9/19: EF normal, Grade I diastolic dysfunction, moderate LVH    # Hx recent DVT/PE:  - Continue Eliquis    # Metabolic alkalosis  - Likely secondary to chronic CO2 retention from obesity    # Anxiety  - Continue Buspirone PRN  - Patient gets extremely anxious as soon as her  leaves  - Avoid sedating medications    # CAD  - Asa, statin    # Syncope - resolved  - ?related to hypotension on admission which has since improved with IVF    DVT PPx: Apixaban  Medically ready for [place] when bed available    Kathryn Cervantes PA-C  10/01/17  8:03 AM

## 2017-10-02 ENCOUNTER — APPOINTMENT (OUTPATIENT)
Dept: CARDIOLOGY | Facility: HOSPITAL | Age: 82
End: 2017-10-02

## 2017-10-02 VITALS
BODY MASS INDEX: 44.21 KG/M2 | OXYGEN SATURATION: 95 % | RESPIRATION RATE: 20 BRPM | HEIGHT: 60 IN | HEART RATE: 83 BPM | TEMPERATURE: 98.1 F | WEIGHT: 225.2 LBS | SYSTOLIC BLOOD PRESSURE: 99 MMHG | DIASTOLIC BLOOD PRESSURE: 77 MMHG

## 2017-10-02 PROBLEM — L03.90 CELLULITIS: Status: RESOLVED | Noted: 2017-09-24 | Resolved: 2017-10-02

## 2017-10-02 PROBLEM — E87.20 LACTIC ACIDOSIS: Status: RESOLVED | Noted: 2017-09-24 | Resolved: 2017-10-02

## 2017-10-02 PROBLEM — J96.02 ACUTE HYPERCAPNIC RESPIRATORY FAILURE: Status: RESOLVED | Noted: 2017-09-26 | Resolved: 2017-10-02

## 2017-10-02 PROBLEM — G47.34 NOCTURNAL HYPOXIA: Status: ACTIVE | Noted: 2017-10-02

## 2017-10-02 PROBLEM — E87.29 RESPIRATORY ACIDOSIS: Status: RESOLVED | Noted: 2017-09-25 | Resolved: 2017-10-02

## 2017-10-02 PROCEDURE — 94660 CPAP INITIATION&MGMT: CPT

## 2017-10-02 PROCEDURE — 97110 THERAPEUTIC EXERCISES: CPT

## 2017-10-02 PROCEDURE — 97116 GAIT TRAINING THERAPY: CPT

## 2017-10-02 PROCEDURE — 94799 UNLISTED PULMONARY SVC/PX: CPT

## 2017-10-02 PROCEDURE — 99239 HOSP IP/OBS DSCHRG MGMT >30: CPT | Performed by: PHYSICIAN ASSISTANT

## 2017-10-02 RX ORDER — FLUTICASONE PROPIONATE 50 MCG
1 SPRAY, SUSPENSION (ML) NASAL 2 TIMES DAILY PRN
Start: 2017-10-02 | End: 2018-10-01

## 2017-10-02 RX ORDER — POLYETHYLENE GLYCOL 3350 17 G/17G
17 POWDER, FOR SOLUTION ORAL DAILY
Start: 2017-10-03 | End: 2017-11-08

## 2017-10-02 RX ORDER — PSEUDOEPHEDRINE HCL 30 MG
100 TABLET ORAL 2 TIMES DAILY PRN
Start: 2017-10-02 | End: 2017-11-08

## 2017-10-02 RX ORDER — BUSPIRONE HYDROCHLORIDE 5 MG/1
5 TABLET ORAL EVERY 12 HOURS PRN
Status: ON HOLD
Start: 2017-10-02 | End: 2018-10-15

## 2017-10-02 RX ADMIN — FLUTICASONE PROPIONATE 1 SPRAY: 50 SPRAY, METERED NASAL at 08:55

## 2017-10-02 RX ADMIN — APIXABAN 5 MG: 5 TABLET, FILM COATED ORAL at 08:32

## 2017-10-02 RX ADMIN — GUAIFENESIN 600 MG: 600 TABLET, EXTENDED RELEASE ORAL at 08:31

## 2017-10-02 RX ADMIN — OXYMETAZOLINE HYDROCHLORIDE 2 SPRAY: 5 SPRAY NASAL at 08:32

## 2017-10-02 RX ADMIN — POTASSIUM CHLORIDE 40 MEQ: 750 CAPSULE, EXTENDED RELEASE ORAL at 11:34

## 2017-10-02 RX ADMIN — FLUOXETINE HYDROCHLORIDE 20 MG: 20 CAPSULE ORAL at 08:31

## 2017-10-02 RX ADMIN — POLYETHYLENE GLYCOL 3350 17 G: 17 POWDER, FOR SOLUTION ORAL at 08:32

## 2017-10-02 RX ADMIN — PANTOPRAZOLE SODIUM 40 MG: 40 TABLET, DELAYED RELEASE ORAL at 08:31

## 2017-10-02 RX ADMIN — DOCUSATE SODIUM 100 MG: 100 CAPSULE, LIQUID FILLED ORAL at 08:34

## 2017-10-02 RX ADMIN — ASPIRIN 81 MG: 81 TABLET, COATED ORAL at 08:31

## 2017-10-02 RX ADMIN — LEVOFLOXACIN 750 MG: 750 TABLET, FILM COATED ORAL at 15:25

## 2017-10-02 RX ADMIN — ACETAMINOPHEN 650 MG: 325 TABLET, FILM COATED ORAL at 15:17

## 2017-10-02 NOTE — THERAPY TREATMENT NOTE
Acute Care - Physical Therapy Treatment Note  [facility]     Patient Name: Iliana Oleary  : 1934  MRN: [id number]  Today's Date: 10/2/2017  Onset of Illness/Injury or Date of Surgery Date: 17  Date of Referral to PT: 17  Referring Physician: ALAN Martinez    Admit Date: 2017    Visit Dx:    ICD-10-CM ICD-9-CM   1. Syncope, unspecified syncope type R55 780.2   2. Head injury due to trauma, initial encounter S09.90XA 959.01   3. Chronic anticoagulation Z79.01 V58.61   4. Sepsis, due to unspecified organism A41.9 038.9     995.91   5. Acute cystitis without hematuria N30.00 595.0   6. Peripheral edema R60.9 782.3   7. Adult failure to thrive syndrome R62.7 783.7   8. Impaired functional mobility, balance, gait, and endurance Z74.09 V49.89     Patient Active Problem List   Diagnosis   • Benign essential HTN   • Hypertension   • Dyslipidemia   • PFO (patent foramen ovale)   • Morbid obesity   • Osteoarthritis   • Coronary artery disease   • Myocardial infarction   • Pulmonary embolism   • Shortness of breath   • Renal insufficiency   • Pulmonary infarct   • UTI (urinary tract infection)   • Cellulitis   • Lactic acidosis   • Respiratory acidosis   • Acute hypercapnic respiratory failure               Adult Rehabilitation Note       10/02/17 0930 17 1045       Rehab Assessment/Intervention    Discipline physical therapist  -KM physical therapist  -MARITZA     Document Type therapy note (daily note)  -KM therapy note (daily note)  -MARITZA     Subjective Information agree to therapy  -KM agree to therapy;complains of;fatigue  -MARITZA     Patient Effort, Rehab Treatment good  -KM good  -MARITZA     Symptoms Noted During/After Treatment fatigue  -KM fatigue  -MARITZA     Precautions/Limitations fall precautions  -KM fall precautions;oxygen therapy device and L/min  -MARITZA     Recorded by [KM] Bette Gonzalez, PT [MARITZA] Abiola Coleman PT     Vital Signs    Pretreatment Heart Rate (beats/min)  80  -MARITZA      Intratreatment Heart Rate (beats/min)  112  -MARITZA     Posttreatment Heart Rate (beats/min)  90  -MARITZA     Pre SpO2 (%)  94  -MARITZA     O2 Delivery Pre Treatment  supplemental O2  -MARITZA     Post SpO2 (%)  95  -MARITZA     O2 Delivery Post Treatment  supplemental O2  -MARITZA     Pre Patient Position  Sitting  -MARITZA     Intra Patient Position  Standing  -MARITZA     Post Patient Position  Sitting  -MARITZA     Recorded by  [MARITZA] Abiola Coleman, PT     Pain Assessment    Pain Assessment No/denies pain  -KM Olvera-Lindsey FACES  -MARITZA     Olvera-Baker FACES Pain Rating  0  -MARITZA     Pain Score  0  -MARITZA     Recorded by [KM] Bette Gonzalez, PT [MARITZA] Abiola Coleman, PT     Cognitive Assessment/Intervention    Current Cognitive/Communication Assessment functional  -KM functional  -MARITZA     Orientation Status oriented x 4  -KM oriented x 4  -MARITZA     Follows Commands/Answers Questions able to follow single-step instructions;100% of the time  -% of the time  -MARITZA     Personal Safety mild impairment  -KM mild impairment;decreased awareness, need for assist;decreased awareness, need for safety;decreased insight to deficits  -MARITZA     Personal Safety Interventions fall prevention program maintained  -KM fall prevention program maintained;gait belt;nonskid shoes/slippers when out of bed  -MARITZA     Recorded by [KM] Bette Gonzalez, PT [MARITZA] Abiola Coleman, PT     Bed Mobility, Assessment/Treatment    Bed Mobility, Assistive Device bed rails;head of bed elevated  -KM      Bed Mobility, Roll Left, [place] conditional independence  -KM      Bed Mobility, Roll Right, [place] conditional independence  -KM      Bed Mobility, Scoot/Bridge, [place] verbal cues required;minimum assist (75% patient effort)  -KM      Bed Mob, Supine to Sit, [place] verbal cues required;minimum assist (75% patient effort)  -KM      Bed Mobility, Comment  patient is OOB and returns to the chair post ambulation  -MARITZA     Recorded by [KM] Bette Gonzalez, PT [MRAITZA]  Abiola Coleman, PT     Transfer Assessment/Treatment    Transfers, Sit-Stand [place] contact guard assist;2 person assist required  -KM contact guard assist;2 person assist required  -MARITZA     Transfers, Stand-Sit [place] verbal cues required;contact guard assist;2 person assist required  -KM contact guard assist;2 person assist required  -MARITZA     Transfers, Sit-Stand-Sit, Assist Device  rolling walker  -MARITZA     Toilet Transfer, [place]  contact guard assist;2 person assist required  -MARITZA     Toilet Transfer, Assistive Device  rolling walker  -MARITZA     Transfer, Safety Issues  sequencing ability decreased  -MARITZA     Transfer, Impairments  strength decreased;impaired balance  -MARITZA     Transfer, Comment  cues for hand placement patient did several sit to stand from commode due to fatigue and not able to amb unless she rested  -MARITZA     Recorded by [KM] Bette Gonzalez, PT [MARITZA] Abiola Coleman, PT     Gait Assessment/Treatment    Gait, [place] Level contact guard assist;1 person + 1 person to manage equipment   chair brought to pt.  -KM contact guard assist;1 person + 1 person to manage equipment  -MARITZA     Gait, Assistive Device rolling walker  -KM rolling walker  -MARITZA     Gait, Distance (Feet) 35  -KM 30  -MARITZA     Gait, Gait Pattern Analysis  swing-to gait  -MARITZA     Gait, Gait Deviations bayron decreased;step length decreased  -KM step length decreased  -MARITZA     Gait, Safety Issues  balance decreased during turns;step length decreased;supplemental O2  -MARITZA     Gait, Impairments  impaired balance;strength decreased  -MARITZA     Gait, Comment  chair brought up behind patient for safety, patient had inc HR with activity today  -MARITZA     Recorded by [KM] Bette Gonzalez, PT [MARITZA] Abiola Coleman, PT     Balance Skills Training    Sitting-Level of Assistance Close supervision  -KM Contact guard  -MARITZA     Sitting-Balance Support Feet supported  -KM Feet supported  -MARITZA     Standing-Level of Assistance Contact  guard  -KM Minimum assistance;x2  -MARITZA     Static Standing Balance Support assistive device  -KM assistive device  -MARITZA     Gait Balance-Level of Assistance Contact guard  -KM Contact guard;x2  -MARITZA     Gait Balance Support assistive device  -KM assistive device  -MARITZA     Recorded by [KM] Bette Gonzalez, PT [MARITZA] Abiola Coleman PT     Therapy Exercises    Bilateral Lower Extremities AAROM:;10 reps;sitting;ankle pumps/circles;glut sets;hip abduction/adduction;hip IR;hip ER;quad sets;LAQ  -KM AROM:;10 reps;sitting;ankle pumps/circles;LAQ  -MARITZA     Recorded by [KM] Bette Gonzalez, PT [MARITZA] Abiola Coleman PT     Positioning and Restraints    Pre-Treatment Position in bed  -KM sitting in chair/recliner  -MARITZA     Post Treatment Position  chair  -MARITZA     In Chair reclined;call light within reach;encouraged to call for assist;exit alarm on;with family/caregiver  -KM notified nsg;reclined;sitting;call light within reach;exit alarm on  -MARITZA     Recorded by [KM] Bette Goznalez, PT [MARITZA] Abiola Coleman PT       User Key  (r) = Recorded By, (t) = Taken By, (c) = Cosigned By    Initials Name Effective Dates    MARITZA Abiola Coleman, PT 06/19/15 -     KM Bette Gonzalez, PT 06/19/15 -                 IP PT Goals       10/02/17 1046 09/25/17 1652 09/24/17 1501    Bed Mobility PT LTG    Bed Mobility PT LTG, Date Established   09/24/17  -KM    Bed Mobility PT LTG, Time to Achieve   2 wks  -KM    Bed Mobility PT LTG, Activity Type   all bed mobility  -KM    Bed Mobility PT LTG, [place] Level   independent  -KM    Bed Mobility PT LTG, Outcome goal ongoing  -KM goal ongoing  -DM     Transfer Training PT LTG    Transfer Training PT LTG, Date Established   09/24/17  -KM    Transfer Training PT LTG, Time to Achieve   2 wks  -KM    Transfer Training PT LTG, Activity Type   bed to chair /chair to bed;sit to stand/stand to sit  -KM    Transfer Training PT LTG, [place] Level   independent  -KM    Transfer  Training PT LTG, Assist Device   walker, rolling  -KM    Transfer Training PT LTG, Outcome goal ongoing  -KM goal ongoing  -DM     Gait Training PT LTG    Gait Training Goal PT LTG, Date Established   09/24/17  -KM    Gait Training Goal PT LTG, Time to Achieve   2 wks  -KM    Gait Training Goal PT LTG, [place] Level   independent  -KM    Gait Training Goal PT LTG, Assist Device   walker, rolling  -KM    Gait Training Goal PT LTG, Distance to Achieve   150  -KM    Gait Training Goal PT LTG, Outcome goal ongoing  -KM goal ongoing  -DM       User Key  (r) = Recorded By, (t) = Taken By, (c) = Cosigned By    Initials Name Provider Type    DAMI Gonzalez, PT Physical Therapist    PHILIPPE Redd, PT Physical Therapist          Physical Therapy Education     Title: PT OT SLP Therapies (Done)     Topic: Physical Therapy (Done)     Point: Mobility training (Done)    Learning Progress Summary    Learner Readiness Method Response Comment Documented by Status   Patient Acceptance E [facility] 10/02/17 1046 Done    Acceptance E NR   09/30/17 1148 Active    Uri CARDOZONR reviewed safety iwth mobility SC 09/28/17 1606 Done    Eager E AUGUST,NR reviewed hep, benefits of activity SC 09/27/17 1145 Done    Acceptance E,D NR   09/25/17 1652 Active    Acceptance E [facility] 09/24/17 1504 Done               Point: Home exercise program (Done)    Learning Progress Summary    Learner Readiness Method Response Comment Documented by Status   Patient Acceptance E [facility] 10/02/17 1046 Done    Acceptance E NR   09/30/17 1148 Active    Uri CARDOZONR reviewed safety [facility] mobility SC 09/28/17 1606 Done    Uri E AUGUSTNR reviewed hep, benefits of activity SC 09/27/17 1145 Done    Acceptance E,D NR   09/25/17 1652 Active    Acceptance E [facility] 09/24/17 1504 Done               Point: Body mechanics (Done)    Learning Progress Summary    Learner Readiness Method Response Comment Documented by Status   Patient Acceptance E [facility] 10/02/17  1046 Done    Acceptance E NR   09/30/17 1148 Active    Eager E CAS,NR reviewed safety iwth mobility SC 09/28/17 1606 Done    Eager E AUGUST,NR reviewed hep, benefits of activity SC 09/27/17 1145 Done    Acceptance E,D NR   09/25/17 1652 Active    Acceptance E VU   09/24/17 1504 Done               Point: Precautions (Done)    Learning Progress Summary    Learner Readiness Method Response Comment Documented by Status   Patient Acceptance E VU  KM 10/02/17 1046 Done    Acceptance E NR   09/30/17 1148 Active    Eager E CAS,NR reviewed safety iwth mobility SC 09/28/17 1606 Done    Eager E AUGUST,NR reviewed hep, benefits of activity SC 09/27/17 1145 Done    Acceptance E,D NR   09/25/17 1652 Active    Acceptance E VU   09/24/17 1504 Done                      User Key     Initials Effective Dates Name Provider Type Discipline    SC 06/19/15 -  Alex Forte, PT Physical Therapist PT     06/19/15 -  Abiola Coleman, PT Physical Therapist PT     06/19/15 -  Bette Gonzalez, PT Physical Therapist PT     06/19/15 -  Francisca Redd, PT Physical Therapist PT                    PT Recommendation and Plan  Anticipated Discharge Disposition: skilled nursing facility  PT Frequency: daily  Plan of Care Review  Plan Of Care Reviewed With: patient, spouse  Progress: progress towards functional goals is fair  Outcome Summary/Follow up Plan: pt needs encouragement and reassurance re: progress, limited by fatigue, anticipate improvement with time and snf rehab          Outcome Measures       10/02/17 0930 09/30/17 1045       How much help from another person do you currently need...    Turning from your back to your side while in flat bed without using bedrails? 3  -KM 3  -MARITZA     Moving from lying on back to sitting on the side of a flat bed without bedrails? 2  -KM 2  -MARITZA     Moving to and from a bed to a chair (including a wheelchair)? 3  -KM 3  -MARITZA     Standing up from a chair using your arms (e.g., wheelchair,  bedside chair)? 3  -KM 3  -MARITZA     Climbing 3-5 steps with a railing? 2  -KM 2  -MARITZA     To walk in hospital room? 3  -KM 3  -MARITZA     AM-PAC 6 Clicks Score 16  -KM 16  -MARITZA     Functional Assessment    Outcome Measure Options AM-PAC 6 Clicks Basic Mobility (PT)  -KM        User Key  (r) = Recorded By, (t) = Taken By, (c) = Cosigned By    Initials Name Provider Type    MARITZA Coleman, PT Physical Therapist    KM Bette Gonzalez, PT Physical Therapist           Time Calculation:         PT Charges       10/02/17 1049          Time Calculation    Start Time 0930  -KM      PT Received On 10/02/17  -KM      PT Goal Re-Cert Due Date 10/04/17  -KM      Time Calculation- PT    Total Timed Code Minutes- PT 27 minute(s)  -KM        User Key  (r) = Recorded By, (t) = Taken By, (c) = Cosigned By    Initials Name Provider Type    DAMI Gonzalez, PT Physical Therapist          Therapy Charges for Today     Code Description Service Date Service Provider Modifiers Qty    44663749352 HC GAIT TRAINING EA 15 MIN 10/2/2017 Bette Gonzalez, PT GP 1    15653612116 HC PT THER PROC EA 15 MIN 10/2/2017 Bette Gonzalez, PT GP 1    86786603529 HC PT THER SUPP EA 15 MIN 10/2/2017 Bette Gonzalez, PT GP 2          PT G-Codes  Outcome Measure Options: AM-PAC 6 Clicks Basic Mobility (PT)    Bette Gonzalez, PT  10/2/2017

## 2017-10-02 NOTE — PLAN OF CARE
Problem: Pressure Ulcer Risk (Tang Scale) (Adult,Obstetrics,Pediatric)  Goal: Skin Integrity    10/01/17 0457   Pressure Ulcer Risk (Tang Scale) (Adult,Obstetrics,Pediatric)   Skin Integrity making progress toward outcome

## 2017-10-02 NOTE — PLAN OF CARE
Problem: Fall Risk (Adult)  Goal: Identify Related Risk Factors and Signs and Symptoms    09/27/17 0455   Fall Risk   Fall Risk: Related Risk Factors age-related changes;confusion/agitation;fatigue/slow reaction   Fall Risk: Signs and Symptoms presence of risk factors

## 2017-10-02 NOTE — DISCHARGE SUMMARY
[facility]  TRANSFER SUMMARY    Patient Name: Iliana Oleary  : 1934  MRN: [id number]    Date of Admission: 2017  Date of Discharge:    Length of Stay: 9  Primary Care Physician: ALAN Marmolejo    Consults     Date and Time Order Name Status Description    2017 1253 Inpatient Consult to Nephrology Completed         Hospital Course     Presenting Problem:   Syncope, unspecified syncope type [R55]    Active Hospital Problems (** Indicates Principal Problem)    Diagnosis Date Noted   • Nocturnal hypoxia (and hypoxia when resting) [G47.34] 10/02/2017   • Pulmonary embolism [I26.99] 2017   • Coronary artery disease [I25.10] 2017   • PFO (patent foramen ovale) [Q21.1]    • Dyslipidemia [E78.5]    • Benign essential HTN [I10] 07/10/2016      Resolved Hospital Problems    Diagnosis Date Noted Date Resolved   • **Acute hypercapnic respiratory failure [J96.02] 2017 10/02/2017   • Respiratory acidosis [E87.2] 2017 10/02/2017   • Cellulitis [L03.90] 2017 10/02/2017   • Leukocytosis [D72.829] 2017   • Lactic acidosis [E87.2] 2017 10/02/2017   • Syncope [R55] 2017      Hospital Course:  Ms. Iliana Oleary is an 84yo female with a history of CAD s/p MI and stents, HTN, hyperlipidemia OA, renal insufficiency and recent RLE DVTs/PE (on Eliquis).  She presented to [facility] ED on 17 following a syncopal episode at home.  Patient's  reported that over the past few days, she had been experiencing increased swelling in her legs bilaterally.  Physical therapy was in the home on the morning of presentation and the patient was unable to participate secondary to pain in her feet/legs and generalized weakness.  Later that day, she was in the bathroom getting dressed to come to the ED secondary to swelling.  Which did not come out for quite some time, her  checked on  her and found her unconscious on the floor.  EMS was called.    Of note, the patient was admitted to [facility] on 9/18-9/20 4 dyspnea with recent acute PE and DVT ×2 to the right lower extremity.  She had decreased renal function and generalized weakness.  Nephrology was consulted and blood pressure medications were adjusted.  She was also found to have UTI and treated with IV Rocephin.    Patient was found to be hypotensive in the ED with elevated BNP, lactic acidosis, leukocytosis, bilateral lower extremity edema with right lower extremity erythema suspicious for cellulitis.  She received IV vancomycin and Zosyn in the ED and was transitioned to IV Rocephin and Zosyn. She was diuresed with Lasix for CHF exaverbation and treated for community acquired pneumonia.     Due to hypercapnia and somnolence, she was treated with BiPAP at night. She will need an outpatient sleep study arranged following discharge. Sedating medications were held. Buspar was given for anxiety.     Ms. Oleary completed antibiotics prior to discharge.  She would benefit from BiPAP at night but if this cannot be arranged, will need at least O2 at night until sleep study can be performed.   She is stable for transfer on 10/2/17.     Day of Discharge     HPI:   She feels well today, looks better than yesterday. Just finished with a bath. No chest pain, dyspnea, nausea, vomiting, diarrhea or constipation. Urinating okay.     Review of Systems   Gen-no fevers, chills  CV-no chest pain, palpitations  Resp-no cough, dyspnea  GI-no N/V/D, abd pain    Otherwise complete 10-system ROS is negative except as mentioned in the HPI.    Temp:  [97.9 °F (36.6 °C)-98.5 °F (36.9 °C)] 98.1 °F (36.7 °C)  Heart Rate:  [75-85] 83  Resp:  [16-20] 20  BP: ()/() 99/77     Physical Exam:  Constitutional: No acute distress, awake, alert.   HENT: NCAT, mucous membranes moist  Respiratory: Clear to auscultation bilaterally, respiratory effort  normal   Cardiovascular: RRR, no murmurs, rubs, or gallops, palpable pedal pulses bilaterally  Gastrointestinal: Positive bowel sounds, soft, nontender, nondistended  Musculoskeletal: No bilateral ankle edema  Psychiatric: Oriented x 3, bright/pleasant affect, cooperative  Neurologic: Strength symmetric in all extremities, CN grossly intact to confrontation, speech is clear  Skin: No rashes    Pertinent Results     Lab Results (last 7 days)     Procedure Component Value Units Date/Time    CBC (No Diff) [[id number]]  (Abnormal) Collected:  09/26/17 0658    Specimen:  Blood Updated:  09/26/17 0726     WBC 9.55 10*3/mm3      RBC 3.76 (L) 10*6/mm3      Hemoglobin 10.5 (L) g/dL      Hematocrit 35.5 %      MCV 94.4 fL      MCH 27.9 pg      MCHC 29.6 (L) g/dL      RDW 15.9 (H) %      RDW-SD 55.1 (H) fl      MPV 9.4 fL      Platelets 337 10*3/mm3     Basic Metabolic Panel [[id number]]  (Abnormal) Collected:  09/26/17 0658    Specimen:  Blood Updated:  09/26/17 0759     Glucose 133 (H) mg/dL      BUN 17 mg/dL      Creatinine 0.90 mg/dL      Sodium 137 mmol/L      Potassium 4.8 mmol/L      Chloride 104 mmol/L      CO2 28.0 mmol/L      Calcium 9.4 mg/dL      eGFR Non African Amer 60 (L) mL/min/1.73      BUN/Creatinine Ratio 18.9     Anion Gap 5.0 mmol/L     Blood Gas, Arterial [[id number]]  (Abnormal) Collected:  09/26/17 1001    Specimen:  Arterial Blood Updated:  09/26/17 1004     Site Arterial: right radial     Serg's Test N/A     pH, Arterial 7.326 (L) pH units      pCO2, Arterial 52.1 (H) mm Hg      pO2, Arterial 73.8 (L) mm Hg      HCO3, Arterial 27.2 (H) mmol/L      Base Excess, Arterial 0.7 mmol/L      Hemoglobin, Blood Gas 9.2 (L) g/dL      Hematocrit, Blood Gas 28.1 %      Oxyhemoglobin 93.7 (L) %      Methemoglobin 0.90 %      Carboxyhemoglobin 1.2 %      CO2 Content 28.8     Barometric Pressure for Blood Gas -- mmHg       N/A        Modality BiPap     FIO2 35 %      Set Mech Resp Rate 12     Rate 14 Breaths/minute       IPAP 14     EPAP 6    Respiratory Culture [[id number]] Collected:  09/25/17 1511    Specimen:  Sputum from Cough Updated:  09/27/17 0750     Respiratory Culture --      Moderate growth (3+) Normal Respiratory Kristan     Gram Stain Result Many (4+) WBCs per low power field      Rare (1+) Epithelial cells per low power field      Few (2+) Gram positive cocci in groups      Rare (1+) Yeast      Rare (1+) Normal respiratory kristan    CBC Auto Differential [[id number]]  (Abnormal) Collected:  09/27/17 0728    Specimen:  Blood Updated:  09/27/17 0804     WBC 10.07 10*3/mm3      RBC 3.51 (L) 10*6/mm3      Hemoglobin 9.6 (L) g/dL      Hematocrit 32.6 (L) %      MCV 92.9 fL      MCH 27.4 pg      MCHC 29.4 (L) g/dL      RDW 15.5 (H) %      RDW-SD 52.7 fl      MPV 9.4 fL      Platelets 291 10*3/mm3      Neutrophil % 66.1 %      Lymphocyte % 14.3 (L) %      Monocyte % 11.1 %      Eosinophil % 7.1 (H) %      Basophil % 0.8 %      Immature Grans % 0.6 %      Neutrophils, Absolute 6.66 10*3/mm3      Lymphocytes, Absolute 1.44 10*3/mm3      Monocytes, Absolute 1.12 (H) 10*3/mm3      Eosinophils, Absolute 0.71 (H) 10*3/mm3      Basophils, Absolute 0.08 10*3/mm3      Immature Grans, Absolute 0.06 (H) 10*3/mm3     Basic Metabolic Panel [[id number]]  (Abnormal) Collected:  09/27/17 0728    Specimen:  Blood Updated:  09/27/17 0851     Glucose 114 (H) mg/dL      BUN 13 mg/dL      Creatinine 0.90 mg/dL      Sodium 140 mmol/L      Potassium 3.3 (L) mmol/L       Verified by repeat analysis.         Chloride 105 mmol/L      CO2 29.0 mmol/L      Calcium 9.0 mg/dL      eGFR Non African Amer 60 (L) mL/min/1.73      BUN/Creatinine Ratio 14.4     Anion Gap 6.0 mmol/L     CBC Auto Differential [[id number]]  (Abnormal) Collected:  09/28/17 0540    Specimen:  Blood Updated:  09/28/17 0647     WBC 10.28 10*3/mm3      RBC 3.55 (L) 10*6/mm3      Hemoglobin 9.7 (L) g/dL      Hematocrit 32.7 (L) %      MCV 92.1 fL      MCH 27.3 pg      MCHC 29.7 (L) g/dL       RDW 15.1 (H) %      RDW-SD 50.8 fl      MPV 9.4 fL      Platelets 320 10*3/mm3      Neutrophil % 66.8 %      Lymphocyte % 13.7 (L) %      Monocyte % 10.7 %      Eosinophil % 7.5 (H) %      Basophil % 0.6 %      Immature Grans % 0.7 (H) %      Neutrophils, Absolute 6.87 10*3/mm3      Lymphocytes, Absolute 1.41 10*3/mm3      Monocytes, Absolute 1.10 (H) 10*3/mm3      Eosinophils, Absolute 0.77 (H) 10*3/mm3      Basophils, Absolute 0.06 10*3/mm3      Immature Grans, Absolute 0.07 (H) 10*3/mm3     Basic Metabolic Panel [[id number]]  (Abnormal) Collected:  09/28/17 0540    Specimen:  Blood Updated:  09/28/17 0648     Glucose 112 (H) mg/dL      BUN 13 mg/dL      Creatinine 1.00 mg/dL      Sodium 140 mmol/L      Potassium 3.1 (L) mmol/L      Chloride 102 mmol/L      CO2 27.0 mmol/L      Calcium 8.5 (L) mg/dL      eGFR Non African Amer 53 (L) mL/min/1.73      BUN/Creatinine Ratio 13.0     Anion Gap 11.0 mmol/L     Magnesium [[id number]]  (Normal) Collected:  09/28/17 0540    Specimen:  Blood Updated:  09/28/17 0827     Magnesium 1.5 mg/dL     Blood Culture [[id number]]  (Normal) Collected:  09/23/17 1230    Specimen:  Blood from Arm, Right Updated:  09/28/17 1501     Blood Culture No growth at 5 days    Blood Culture [[id number]]  (Normal) Collected:  09/23/17 1509    Specimen:  Blood from Hand, Left Updated:  09/28/17 1601     Blood Culture No growth at 5 days    CBC (No Diff) [[id number]]  (Abnormal) Collected:  09/29/17 0531    Specimen:  Blood Updated:  09/29/17 0622     WBC 9.51 10*3/mm3      RBC 3.68 (L) 10*6/mm3      Hemoglobin 9.9 (L) g/dL      Hematocrit 33.3 (L) %      MCV 90.5 fL      MCH 26.9 (L) pg      MCHC 29.7 (L) g/dL      RDW 15.1 (H) %      RDW-SD 50.2 fl      MPV 9.5 fL      Platelets 328 10*3/mm3     Renal Function Panel [[id number]]  (Abnormal) Collected:  09/29/17 0531    Specimen:  Blood Updated:  09/29/17 0638     Glucose 102 (H) mg/dL      BUN 15 mg/dL      Creatinine 0.80 mg/dL      Sodium 142  mmol/L      Potassium 3.8 mmol/L      Chloride 103 mmol/L      CO2 32.0 (H) mmol/L      Calcium 8.5 (L) mg/dL      Albumin 3.20 g/dL      Phosphorus 2.5 mg/dL      Anion Gap 7.0 mmol/L      BUN/Creatinine Ratio 18.8     eGFR Non African Amer 69 mL/min/1.73     CBC (No Diff) [[id number]]  (Abnormal) Collected:  09/30/17 0625    Specimen:  Blood Updated:  09/30/17 0657     WBC 10.21 10*3/mm3      RBC 3.66 (L) 10*6/mm3      Hemoglobin 9.9 (L) g/dL      Hematocrit 33.8 (L) %      MCV 92.3 fL      MCH 27.0 pg      MCHC 29.3 (L) g/dL      RDW 15.6 (H) %      RDW-SD 52.7 fl      MPV 9.4 fL      Platelets 292 10*3/mm3     Renal Function Panel [[id number]]  (Abnormal) Collected:  09/30/17 0625    Specimen:  Blood Updated:  09/30/17 0725     Glucose 101 (H) mg/dL      BUN 15 mg/dL      Creatinine 0.70 mg/dL      Sodium 140 mmol/L      Potassium 3.9 mmol/L      Chloride 102 mmol/L      CO2 30.0 mmol/L      Calcium 8.4 (L) mg/dL      Albumin 3.00 (L) g/dL      Phosphorus 2.2 (L) mg/dL      Anion Gap 8.0 mmol/L      BUN/Creatinine Ratio 21.4     eGFR Non African Amer 80 mL/min/1.73     Magnesium [[id number]]  (Normal) Collected:  09/30/17 0625    Specimen:  Blood Updated:  09/30/17 0725     Magnesium 1.3 mg/dL     Renal Function Panel [[id number]]  (Abnormal) Collected:  10/01/17 0554    Specimen:  Blood Updated:  10/01/17 0712     Glucose 120 (H) mg/dL      BUN 13 mg/dL      Creatinine 0.70 mg/dL      Sodium 141 mmol/L      Potassium 3.5 mmol/L      Chloride 101 mmol/L      CO2 34.0 (H) mmol/L      Calcium 8.4 (L) mg/dL      Albumin 3.10 (L) g/dL      Phosphorus 3.0 mg/dL      Anion Gap 6.0 mmol/L      BUN/Creatinine Ratio 18.6     eGFR Non African Amer 80 mL/min/1.73     Magnesium [[id number]]  (Abnormal) Collected:  10/01/17 0554    Specimen:  Blood Updated:  10/01/17 0712     Magnesium 3.3 (H) mg/dL     CBC (No Diff) [[id number]]  (Abnormal) Collected:  10/01/17 0554    Specimen:  Blood Updated:  10/01/17 0751     WBC 10.26  10*3/mm3      RBC 3.57 (L) 10*6/mm3      Hemoglobin 9.7 (L) g/dL      Hematocrit 32.9 (L) %      MCV 92.2 fL      MCH 27.2 pg      MCHC 29.5 (L) g/dL      RDW 15.5 (H) %      RDW-SD 52.6 fl      MPV 9.6 fL      Platelets 316 10*3/mm3         Respiratory Culture   Date Value Ref Range Status   09/25/2017 Moderate growth (3+) Normal Respiratory Randa  Final     Imaging Results (all)     Procedure Component Value Units Date/Time    CT Head Without Contrast [[id number]] Collected:  09/23/17 1449     Updated:  09/23/17 2325    Narrative:       EXAMINATION: CT HEAD WO CONTRAST - 09/23/2017     INDICATION: Fall, head injury.      TECHNIQUE: 5 mm enhanced images through the brain.     The radiation dose reduction device was turned on for each scan per the  ALARA (As Low as Reasonably Achievable) protocol.     COMPARISON: 11/07/2012 head CT scan.     FINDINGS: The calvarium appears intact. The included paranasal sinuses  and mastoids appear clear. Soft tissue window images show no evidence of  hemorrhage, contusion, edema, acute infarct, mass, mass effect,  hydrocephalus, or abnormal extra-axial collection. There is generalized  cerebral atrophy within expected limits for age. A very small area of  focal encephalomalacia in the posterior left parafalcine cortex, either  in the anterior left parietal lobe or posterior left frontal lobe, is  stable, presumably a small old infarct.       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 No new intracranial disease. No evidence of  acute trauma.     DICTATED:     09/23/2017  EDITED:         09/23/2017     This report was finalized on 9/23/2017 11:23 PM by DR. Lc Ge MD.       CT Chest Without Contrast [[id number]] Collected:  09/23/17 1733     Updated:  09/23/17 2329    Narrative:       EXAMINATION: CT CHEST WO CONTRAST - 09/23/2017     INDICATION: Fall, chest wall and back pain.     TECHNIQUE: 5 mm unenhanced images of the  chest and upper abdomen.     The radiation dose reduction device was turned on for each scan per the  ALARA (As Low as Reasonably Achievable) protocol.     COMPARISON: 9/18/2017 chest CT scan.     FINDINGS: Patient history indicates syncope and fall, chest wall and  back pain.     There is mild dependent atelectasis of the posterior lower lobes but the  lungs otherwise appear clear. In particular, no effusion, pneumothorax  or pleural thickening is seen. No displaced rib fracture is identified.  No thoracic compression deformity is seen. There is no evidence of  mediastinal mass,  adenopathy or pericardial effusion.     Included images of the upper abdomen show no significant abnormalities  of the visualized portions of the liver, spleen, pancreas, adrenal  glands, or upper renal poles. The gallbladder is surgically absent.       Impression:       No evidence of acute chest trauma or other active chest disease.     DICTATED:     09/23/2017  EDITED:         09/23/2017     This report was finalized on 9/23/2017 11:27 PM by DR. Lc Ge MD.       XR Foot 3+ View Right [[id number]] Collected:  09/24/17 1738     Updated:  09/25/17 0823    Narrative:       EXAMINATION: XR RIGHT FOOT, 3 VIEWS - 09/24/2017     INDICATION:  R55-Syncope and collapse; S09.90XA-Unspecified injury of  head, initial encounter; Z79.01-Long term (current) use of  anticoagulants; A41.9-Sepsis, unspecified organism; N30.00-Acute  cystitis without hematuria; R60.9-Edema, unspecified; R62.7-Adult  failure to thrive.     COMPARISON: None.     FINDINGS: Patient history indicates right foot pain, possible foot  injury. There is marked dorsal soft tissue swelling, but no visible  foreign body or gas. The underlying bony structures appear anatomic and  intact, with expected osteopenia for age. No fracture or avulsion is  appreciated.           Impression:       Dorsal soft tissue swelling, but no visible fracture or  other acute bony abnormality.      DICTATED:     09/24/2017  EDITED:         09/24/2017     This report was finalized on 9/25/2017 8:20 AM by DR. Lc Ge MD.       XR Chest 1 View [[id number]] Collected:  09/25/17 0953     Updated:  09/25/17 1057    Narrative:       EXAMINATION: XR CHEST 1 VW-      INDICATION: Signs of fluid overload, increasing thick productive cough,  recent imaging abnormalities RML, increasing hypoxia and O2 requirement,  PE; R55-Syncope and collapse; S09.90XA-Unspecified injury of head,  initial encounter; Z79.01-Long term (current) use of anticoagulants;  A41.9-Sepsis, unspecified organism; N30.00-Acute cystitis without  hematuria; R60.9-Edema, unspecified; R62.7-Adult failure to thrive.      COMPARISON: 09/18/2017.     FINDINGS: Portable chest reveals low lung volumes. The heart is  enlarged.  There is elevation of the right hemidiaphragm.  There is  prominence of the pulmonary vascularity. Previously seen nodular density  in the right midlung is not well appreciated on this examination.           Impression:       Chronic changes seen within the lung fields with enlargement  of the heart and elevation of the right hemidiaphragm. Prominence of the  pulmonary vascularity.     D:  09/25/2017  E:  09/25/2017     This report was finalized on 9/25/2017 10:55 AM by Dr. Eda Ley MD.       XR Chest 1 View [[id number]] Collected:  09/26/17 1030     Updated:  09/26/17 1238    Narrative:       EXAMINATION: XR CHEST 1 VW- 09/26/2017     INDICATION: R55-Syncope and collapse; S09.90XA-Unspecified injury of  head, initial encounter; Z79.01-Long term (current) use of  anticoagulants; A41.9-Sepsis, unspecified organism; N30.00-Acute  cystitis without hematuria; R60.9-Edema, unspecified; R62.7-Adult  failure to thrive; Z74.09-Other reduced mobility      COMPARISON: 09/25/2017     FINDINGS:   1. There is volume reduction at both lung bases with cardiomegaly.  2. Atelectatic opacities are seen at both bases.  3. The mid and upper  lung zones are clear.           Impression:       Compared to previous studies of yesterday, there is mild  progressive volume loss at both lung bases. Atelectatic densities are  seen at the bases with stable cardiomegaly. The mid and upper lung zones  are clear.     D:  09/26/2017  E:  09/26/2017     This report was finalized on 9/26/2017 12:36 PM by Dr. Dhiraj Carrillo MD     Results for orders placed during the hospital encounter of 09/18/17   Adult Transthoracic Echo Complete    Narrative · Left ventricular systolic function is hyperdynamic (EF > 70).  · Left ventricular wall thickness is consistent with moderate concentric  hypertrophy.  · Left ventricular diastolic dysfunction (grade I a) consistent with impaired relaxation.  · Right ventricular cavity is mildly dilated. Mildly reduced right  ventricular systolic function noted. There is relative systolic sparing of  the apex. The RV to LV ratio is approximately 1.0.     Discharge Details      Iliana Oleary   Home Medication Instructions ANTONIO:[id number]    Printed on:10/02/17 1413   Medication Information                      acetaminophen (TYLENOL) 325 MG tablet  Take 2 tablets by mouth Every 6 (Six) Hours As Needed for Mild Pain .             apixaban (ELIQUIS) 5 MG tablet tablet  Take 5 mg by mouth 2 (Two) Times a Day.             aspirin 81 MG EC tablet  Take 81 mg by mouth every morning.             atorvastatin (LIPITOR) 40 MG tablet  Take 40 mg by mouth daily.             Budesonide (ENTOCORT EC) 3 MG 24 hr capsule  Take 3 mg by mouth 4 (Four) Times a Week.             busPIRone (BUSPAR) 5 MG tablet  Take 1 tablet by mouth Every 12 (Twelve) Hours As Needed (Anxiety).             docusate sodium 100 MG capsule  Take 100 mg by mouth 2 (Two) Times a Day As Needed for Constipation.             FLUoxetine (PROzac) 20 MG capsule  Take 20 mg by mouth daily.             fluticasone (FLONASE) 50 MCG/ACT nasal spray  1 spray by Each Nare route 2 (Two) Times  a Day As Needed for Rhinitis or Allergies.             lansoprazole (PREVACID) 30 MG capsule  Take 30 mg by mouth daily.             Multiple Vitamins-Minerals (MULTIVITAMIN PO)  Take 1 tablet by mouth daily.             polyethylene glycol (MIRALAX) packet  Take 17 g by mouth Daily.               Discharge Disposition:  Rehab Facility or Unit (DC - External)    Discharge Diet:  Diet Instructions     Diet: Regular; Thin       Discharge Diet:  Regular   Fluid Consistency:  Thin               Discharge Activity:  Activity Instructions     Activity as Tolerated                   Future Appointments  Date Time Provider Department Center   10/16/2017 10:30 AM Ke Graves MD [facility] None   3/7/2018 9:30 AM Tracy Diego MD [facility] None     Additional Instructions for the Follow-ups that You Need to Schedule     Discharge Follow-up with PCP    As directed    Follow Up Details:  PCP follow up one week after d/c from rehab               Time Spent on Discharge: Discharge 45 min    Kathryn Cervantes PA-C  10/02/17  2:13 PM

## 2017-10-02 NOTE — PROGRESS NOTES
Adult Nutrition  Assessment/PES    Patient Name:  Iliana Oleary  YOB: 1934  MRN: [id number]  Admit Date:  9/23/2017    Assessment Date:  10/2/2017        Reason for Assessment       10/02/17 0811    Reason for Assessment    Reason For Assessment/Visit follow up protocol    Time Spent (min) 20    Diagnosis --   Per notes this admission.   Principal Problem:    Acute hypercapnic respiratory failure  Active Problems:    Benign essential HTN    Dyslipidemia    PFO (patent foramen ovale)    Coronary artery disease    Pulmonary embolism    Cellulitis    Lactic acidosis    Respiratory acidosis             Nutrition/Diet History       10/02/17 0811    Nutrition/Diet History    Reported/Observed By Nursing Assistant    Appetite Improved;Good    Reported GI Symptoms --   Gi symptoms resolving              Labs/Tests/Procedures/Meds       10/02/17 0814    Labs/Tests/Procedures/Meds    Labs/Tests Review Reviewed                Nutrition Prescription Ordered       10/02/17 0814    Nutrition Prescription PO    Current PO Diet Regular    Supplement Boost HP    Supplement Frequency 2 times a day    Common Modifiers Cardiac;Consistent Carbohydrate            Evaluation of Received Nutrient/Fluid Intake       10/02/17 0815    PO Evaluation    Number of Days PO Intake Evaluated 3 days    Number of Meals 9    % PO Intake 75              Problem/Interventions:          Problem 2       10/02/17 0818    Nutrition Diagnoses Problem 2    Problem 2 Nutrition Appropriate for Condition at this Time    Etiology (related to) Other (comment)   clinical condition    Signs/Symptoms (evidenced by) PO Intake    Percent (%) intake recorded 75 %    Over number of meals 9                  Intervention Goal       10/02/17 0819    Intervention Goal    General Nutrition support treatment    PO Maintain intake            Nutrition Intervention       10/02/17 0819    Nutrition Intervention    RD/Tech Action Follow Tx progress;Care plan  reviewd              Education/Evaluation       10/02/17 0819    Monitor/Evaluation    Monitor Per protocol        Comments:      Electronically signed by:  Mi Vernon  10/02/17 8:20 AM

## 2017-10-02 NOTE — PROGRESS NOTES
Continued Stay Note   Yg     Patient Name: Iliana Oleary  MRN: [id number]  Today's Date: 10/2/2017    Admit Date: 9/23/2017          Discharge Plan       10/02/17 0934    Case Management/Social Work Plan    Plan Anil update     Additional Comments Per consult note - patient ready for discharge - CM notified [facility] and they do still have a bed available, they will begin precert and if they hear back today from the insurance then she will be ready for transfer - CM will follow - Malini MILLS [phone]               Discharge Codes     None            Malini Blank RN

## 2017-10-04 ENCOUNTER — APPOINTMENT (OUTPATIENT)
Dept: CARDIOLOGY | Facility: HOSPITAL | Age: 82
End: 2017-10-04

## 2017-10-04 NOTE — PROGRESS NOTES
Continued Stay Note  [facility]     Patient Name: Iliana Oleary  MRN: [id number]  Today's Date: 10/4/2017    Admit Date: 9/23/2017          Discharge Plan     FU from the transition visit to [facility] on 10/3/17.  Ms. Oleary did not transfer with a BIPAP but is using O2 at night.  Spoke with Kathryn Cervantes PA-C this am who recommended when she see's her PCP at dc from the SNF a sleep study be set up.  The recommendation at transfer was a PCP visit within 1 week from discharge from the SNF. She is followed by Sreekanth Shi NP.  Kathy Broderick RN                Discharge Codes     None        Expected Discharge Date and Time     Expected Discharge Date Expected Discharge Time    Oct 2, 2017             Kathy Broderick RN

## 2017-10-26 ENCOUNTER — OFFICE VISIT (OUTPATIENT)
Dept: CARDIOLOGY | Facility: CLINIC | Age: 82
End: 2017-10-26

## 2017-10-26 VITALS
SYSTOLIC BLOOD PRESSURE: 120 MMHG | WEIGHT: 234 LBS | HEART RATE: 72 BPM | HEIGHT: 60 IN | DIASTOLIC BLOOD PRESSURE: 70 MMHG | BODY MASS INDEX: 45.94 KG/M2

## 2017-10-26 DIAGNOSIS — E78.5 DYSLIPIDEMIA: ICD-10-CM

## 2017-10-26 DIAGNOSIS — I25.10 CORONARY ARTERY DISEASE INVOLVING NATIVE HEART WITHOUT ANGINA PECTORIS, UNSPECIFIED VESSEL OR LESION TYPE: Chronic | ICD-10-CM

## 2017-10-26 DIAGNOSIS — Z79.899 HIGH RISK MEDICATION USE: ICD-10-CM

## 2017-10-26 DIAGNOSIS — I10 BENIGN ESSENTIAL HTN: ICD-10-CM

## 2017-10-26 DIAGNOSIS — R60.0 BILATERAL LOWER EXTREMITY EDEMA: Primary | ICD-10-CM

## 2017-10-26 DIAGNOSIS — R06.02 SHORTNESS OF BREATH: ICD-10-CM

## 2017-10-26 PROCEDURE — 99214 OFFICE O/P EST MOD 30 MIN: CPT | Performed by: INTERNAL MEDICINE

## 2017-10-26 RX ORDER — BUMETANIDE 2 MG/1
2 TABLET ORAL DAILY
COMMUNITY
End: 2018-10-01 | Stop reason: ALTCHOICE

## 2017-10-27 PROBLEM — R60.0 BILATERAL LOWER EXTREMITY EDEMA: Status: ACTIVE | Noted: 2017-10-27

## 2017-10-27 RX ORDER — SPIRONOLACTONE 50 MG/1
50 TABLET, FILM COATED ORAL DAILY
Qty: 30 TABLET | Refills: 11 | Status: SHIPPED | OUTPATIENT
Start: 2017-10-27 | End: 2018-11-12 | Stop reason: SDUPTHER

## 2017-10-27 RX ORDER — METOLAZONE 2.5 MG/1
2.5 TABLET ORAL DAILY
Qty: 30 TABLET | Refills: 11 | Status: SHIPPED | OUTPATIENT
Start: 2017-10-27 | End: 2018-11-07 | Stop reason: SDUPTHER

## 2017-10-30 ENCOUNTER — LAB REQUISITION (OUTPATIENT)
Dept: LAB | Facility: HOSPITAL | Age: 82
End: 2017-10-30

## 2017-10-30 DIAGNOSIS — I83.899 VARICOSE VEINS OF UNSPECIFIED LOWER EXTREMITY WITH OTHER COMPLICATIONS (CODE): ICD-10-CM

## 2017-10-30 LAB
ANION GAP SERPL CALCULATED.3IONS-SCNC: 7 MMOL/L (ref 3–11)
BUN BLD-MCNC: 38 MG/DL (ref 9–23)
BUN/CREAT SERPL: 29.2 (ref 7–25)
CALCIUM SPEC-SCNC: 8.9 MG/DL (ref 8.7–10.4)
CHLORIDE SERPL-SCNC: 100 MMOL/L (ref 99–109)
CO2 SERPL-SCNC: 30 MMOL/L (ref 20–31)
CREAT BLD-MCNC: 1.3 MG/DL (ref 0.6–1.3)
GFR SERPL CREATININE-BSD FRML MDRD: 39 ML/MIN/1.73
GLUCOSE BLD-MCNC: 97 MG/DL (ref 70–100)
POTASSIUM BLD-SCNC: 4.4 MMOL/L (ref 3.5–5.5)
SODIUM BLD-SCNC: 137 MMOL/L (ref 132–146)

## 2017-10-30 PROCEDURE — 80048 BASIC METABOLIC PNL TOTAL CA: CPT | Performed by: INTERNAL MEDICINE

## 2017-11-01 ENCOUNTER — RESULTS ENCOUNTER (OUTPATIENT)
Dept: CARDIOLOGY | Facility: CLINIC | Age: 82
End: 2017-11-01

## 2017-11-01 DIAGNOSIS — Z79.899 HIGH RISK MEDICATION USE: ICD-10-CM

## 2017-11-08 ENCOUNTER — OFFICE VISIT (OUTPATIENT)
Dept: CARDIOLOGY | Facility: CLINIC | Age: 82
End: 2017-11-08

## 2017-11-08 ENCOUNTER — TELEPHONE (OUTPATIENT)
Dept: CARDIOLOGY | Facility: CLINIC | Age: 82
End: 2017-11-08

## 2017-11-08 VITALS
BODY MASS INDEX: 41.91 KG/M2 | HEIGHT: 60 IN | DIASTOLIC BLOOD PRESSURE: 68 MMHG | WEIGHT: 213.5 LBS | HEART RATE: 85 BPM | OXYGEN SATURATION: 95 % | SYSTOLIC BLOOD PRESSURE: 118 MMHG

## 2017-11-08 DIAGNOSIS — E78.5 DYSLIPIDEMIA: ICD-10-CM

## 2017-11-08 DIAGNOSIS — R60.0 BILATERAL LOWER EXTREMITY EDEMA: ICD-10-CM

## 2017-11-08 DIAGNOSIS — I10 BENIGN ESSENTIAL HTN: ICD-10-CM

## 2017-11-08 DIAGNOSIS — I25.10 CORONARY ARTERY DISEASE INVOLVING NATIVE HEART WITHOUT ANGINA PECTORIS, UNSPECIFIED VESSEL OR LESION TYPE: Primary | Chronic | ICD-10-CM

## 2017-11-08 PROCEDURE — 99213 OFFICE O/P EST LOW 20 MIN: CPT | Performed by: INTERNAL MEDICINE

## 2017-11-08 RX ORDER — LOPERAMIDE HYDROCHLORIDE 2 MG/1
CAPSULE ORAL AS NEEDED
COMMUNITY
Start: 2017-11-07 | End: 2018-10-01

## 2017-11-08 RX ORDER — TRAMADOL HYDROCHLORIDE 50 MG/1
50 TABLET ORAL EVERY 6 HOURS PRN
Refills: 0 | COMMUNITY
Start: 2017-09-21 | End: 2021-04-05

## 2017-11-08 NOTE — TELEPHONE ENCOUNTER
Order for BMP given via phone to Rob at [facility]- to be drawn in week- 11/15,16 or 17th

## 2017-11-08 NOTE — PROGRESS NOTES
Iliana Oleary  1934  [phone]      11/08/2017    Sreekanth Shi, APRN    Chief Complaint: Coronary Artery Disease and Shortness of Breath      PROBLEM LIST:  1.  Coronary artery disease:  a. Cardiac catheterization: Dr. Warner,  01/06/2009.   i. PCI to proximal LAD (2.75 x 28 mm Promus CIRO).     b. Cardiac catheterization: Dr. Mejia, 08/03/2009: No in-stent restenosis or coronary artery obstruction; normal LV function.  c. Adenosine Cardiolite stress test, 04/30/2014:  Negative for ischemia and scar; LVEF (73%) without WMA.   d. Stress test with PET, 7/11/2016: EF 67%. No evidence of inducible ischemia by scintigraphic criteria.  e. Echocardiogram, 7/11/2016: EF 60%. Mild MR. Mild TR.  f. Echocardiogram, 9/19/2017: EF >70. Moderate concentric hypertrophy. Diastolic dysfunction. RV mildly dilated with reduced systolic function. Relative systolic sparing of the apex. RV:LV ratio is 1.0.   2. Hypertension.   a.   3. Dyslipidemia.   4. PFO versus small ASD (echocardiogram on 05/06/2011) - asymptomatic.    5. Morbid obesity; BMI > 40.    6. Osteoarthritis.   7. Status post surgery, remote:  a. Hemorrhoidectomy, 1966.  b. Right  heel spur surgery, 1988.  c. Left ankle fracture stabilization, 1988.  d. Left hand trigger finger; Right hand trigger finger repair, 1996.    e. Cholecystectomy, May 2000.  f. Left ankle screw and bone chip removal, November 2001.  g. Anal fistulectomy,  October 2002.  h. Right flank abscess (staph), May 2005.  i. Left total knee replacement, October 2007.  j. Right knee replacement, August 2016    Allergies   Allergen Reactions   • Codeine    • Morphine And Related        Current Medications:    Current Outpatient Prescriptions:   •  acetaminophen (TYLENOL) 325 MG tablet, Take 2 tablets by mouth Every 6 (Six) Hours As Needed for Mild Pain ., Disp: , Rfl:   •  apixaban (ELIQUIS) 5 MG tablet tablet, Take 5 mg by mouth 2 (Two) Times a Day., Disp: , Rfl:   •  aspirin 81 MG EC  tablet, Take 81 mg by mouth every morning., Disp: , Rfl:   •  atorvastatin (LIPITOR) 40 MG tablet, Take 40 mg by mouth daily., Disp: , Rfl:   •  Budesonide (ENTOCORT EC) 3 MG 24 hr capsule, Take 3 mg by mouth Daily., Disp: , Rfl:   •  bumetanide (BUMEX) 2 MG tablet, Take 2 mg by mouth Daily., Disp: , Rfl:   •  busPIRone (BUSPAR) 5 MG tablet, Take 1 tablet by mouth Every 12 (Twelve) Hours As Needed (Anxiety)., Disp: , Rfl:   •  DICLOFENAC PO, Take  by mouth As Needed., Disp: , Rfl:   •  FLUoxetine (PROzac) 20 MG capsule, Take 20 mg by mouth daily., Disp: , Rfl:   •  fluticasone (FLONASE) 50 MCG/ACT nasal spray, 1 spray by Each Nare route 2 (Two) Times a Day As Needed for Rhinitis or Allergies., Disp: , Rfl:   •  lansoprazole (PREVACID) 30 MG capsule, Take 30 mg by mouth daily., Disp: , Rfl:   •  loperamide (IMODIUM) 2 MG capsule, As Needed., Disp: , Rfl:   •  metOLazone (ZAROXOLYN) 2.5 MG tablet, Take 1 tablet by mouth Daily., Disp: 30 tablet, Rfl: 11  •  Multiple Vitamins-Minerals (MULTIVITAMIN PO), Take 1 tablet by mouth daily., Disp: , Rfl:   •  O2 (OXYGEN), Inhale 3 L/min Daily. 24/7, Disp: , Rfl:   •  spironolactone (ALDACTONE) 50 MG tablet, Take 1 tablet by mouth Daily., Disp: 30 tablet, Rfl: 11  •  traMADol (ULTRAM) 50 MG tablet, As Needed., Disp: , Rfl: 0    HPI  Patient returns today for follow up with a history of coronary artery disease, and cardiac risk factors. Since last visit she has been doing well overall from a cardiovascular standpoint. She mentions that she has some edema, but less than in the past. Has a home health nurse that can check her labs. Denies any complaints of chest pain, pressure, tightness, or unusual dyspnea.      The following portions of the patient's history were reviewed and updated as appropriate: allergies, current medications and problem list.    Pertinent positives as listed in the HPI.  All other systems reviewed are negative.    Vitals:    11/08/17 1345   BP: 118/68   BP  "Location: Left arm   Patient Position: Sitting   Pulse: 85   SpO2: 95%   Weight: 213 lb 8 oz (96.8 kg)   Height: 60\" (152.4 cm)       General: Alert, awake, and oriented  Neck: Jugular venous pressure is within normal limits. Carotids have normal upstrokes without bruits.   Cardiovascular: Heart has a nondisplaced focal PMI. Regular rate and rhythm without murmur, gallop or rub.  Lungs: Clear without rales or wheezes. Equal expansion is noted.   Extremities: Shows trace to 1+ edema.  Skin: Warm and dry.  Neurologic: nonfocal    Diagnostic Data:  Lab Results   Component Value Date    GLUCOSE 97 10/30/2017    CALCIUM 8.9 10/30/2017     10/30/2017    K 4.4 10/30/2017    CO2 30.0 10/30/2017     10/30/2017    BUN 38 (H) 10/30/2017    CREATININE 1.30 10/30/2017    EGFRIFNONA 39 (L) 10/30/2017    BCR 29.2 (H) 10/30/2017    ANIONGAP 7.0 10/30/2017     Lab Results   Component Value Date    WBC 10.26 10/01/2017    HGB 9.7 (L) 10/01/2017    HCT 32.9 (L) 10/01/2017    MCV 92.2 10/01/2017     10/01/2017     Mg 3.3    Procedures    Assessment:    ICD-10-CM ICD-9-CM   1. Coronary artery disease involving native heart without angina pectoris, unspecified vessel or lesion type I25.10 414.01   2. Benign essential HTN I10 401.1   3. Dyslipidemia E78.5 272.4   4. Bilateral lower extremity edema R60.0 782.3       Plan:    1. Use diabetic socks   2. Obtain labs in one weeks   3. Continue current medications.  4. Check pulse ox at home.  If her saturations are greater than 93% she may discontinue oxygen use during the day  5. F/up in 2 months or sooner if needed.      Scribed for Tracy Diego MD by Lilo Abdi. 11/8/2017  2:06 PM    I Tracy Diego MD personally performed the services described in this documentation as scribed by the above individual in my presence, and it is both accurate and complete.    Tracy Diego MD, FACC    "

## 2017-11-16 ENCOUNTER — TELEPHONE (OUTPATIENT)
Dept: CARDIOLOGY | Facility: CLINIC | Age: 82
End: 2017-11-16

## 2017-11-16 ENCOUNTER — APPOINTMENT (OUTPATIENT)
Dept: GENERAL RADIOLOGY | Facility: HOSPITAL | Age: 82
End: 2017-11-16

## 2017-11-16 ENCOUNTER — HOSPITAL ENCOUNTER (EMERGENCY)
Facility: HOSPITAL | Age: 82
Discharge: HOME | End: 2017-11-16
Attending: EMERGENCY MEDICINE | Admitting: EMERGENCY MEDICINE

## 2017-11-16 VITALS
BODY MASS INDEX: 43.19 KG/M2 | WEIGHT: 220 LBS | HEART RATE: 90 BPM | DIASTOLIC BLOOD PRESSURE: 78 MMHG | OXYGEN SATURATION: 98 % | RESPIRATION RATE: 18 BRPM | HEIGHT: 60 IN | TEMPERATURE: 98.8 F | SYSTOLIC BLOOD PRESSURE: 140 MMHG

## 2017-11-16 DIAGNOSIS — R42 DIZZINESS: Primary | ICD-10-CM

## 2017-11-16 DIAGNOSIS — R11.10 NON-INTRACTABLE VOMITING, PRESENCE OF NAUSEA NOT SPECIFIED, UNSPECIFIED VOMITING TYPE: ICD-10-CM

## 2017-11-16 DIAGNOSIS — T50.905A MEDICATION SIDE EFFECT, INITIAL ENCOUNTER: ICD-10-CM

## 2017-11-16 LAB
ALBUMIN SERPL-MCNC: 4.1 G/DL (ref 3.2–4.8)
ALBUMIN/GLOB SERPL: 1.4 G/DL (ref 1.5–2.5)
ALP SERPL-CCNC: 92 U/L (ref 25–100)
ALT SERPL W P-5'-P-CCNC: 19 U/L (ref 7–40)
ANION GAP SERPL CALCULATED.3IONS-SCNC: 10 MMOL/L (ref 3–11)
AST SERPL-CCNC: 31 U/L (ref 0–33)
BACTERIA UR QL AUTO: ABNORMAL /HPF
BASOPHILS # BLD AUTO: 0.03 10*3/MM3 (ref 0–0.2)
BASOPHILS NFR BLD AUTO: 0.2 % (ref 0–1)
BILIRUB SERPL-MCNC: 0.4 MG/DL (ref 0.3–1.2)
BILIRUB UR QL STRIP: NEGATIVE
BUN BLD-MCNC: 17 MG/DL (ref 9–23)
BUN/CREAT SERPL: 21.3 (ref 7–25)
CALCIUM SPEC-SCNC: 10 MG/DL (ref 8.7–10.4)
CHLORIDE SERPL-SCNC: 95 MMOL/L (ref 99–109)
CLARITY UR: CLEAR
CO2 SERPL-SCNC: 29 MMOL/L (ref 20–31)
COLOR UR: YELLOW
CREAT BLD-MCNC: 0.8 MG/DL (ref 0.6–1.3)
D-LACTATE SERPL-SCNC: 1.1 MMOL/L (ref 0.5–2)
DEPRECATED RDW RBC AUTO: 48.6 FL (ref 37–54)
EOSINOPHIL # BLD AUTO: 0.38 10*3/MM3 (ref 0–0.3)
EOSINOPHIL NFR BLD AUTO: 3.1 % (ref 0–3)
ERYTHROCYTE [DISTWIDTH] IN BLOOD BY AUTOMATED COUNT: 15.3 % (ref 11.3–14.5)
GFR SERPL CREATININE-BSD FRML MDRD: 69 ML/MIN/1.73
GLOBULIN UR ELPH-MCNC: 3 GM/DL
GLUCOSE BLD-MCNC: 108 MG/DL (ref 70–100)
GLUCOSE BLDC GLUCOMTR-MCNC: 105 MG/DL (ref 70–130)
GLUCOSE UR STRIP-MCNC: NEGATIVE MG/DL
HCT VFR BLD AUTO: 37.6 % (ref 34.5–44)
HGB BLD-MCNC: 11.2 G/DL (ref 11.5–15.5)
HGB UR QL STRIP.AUTO: NEGATIVE
HOLD SPECIMEN: NORMAL
HOLD SPECIMEN: NORMAL
HYALINE CASTS UR QL AUTO: ABNORMAL /LPF
IMM GRANULOCYTES # BLD: 0.05 10*3/MM3 (ref 0–0.03)
IMM GRANULOCYTES NFR BLD: 0.4 % (ref 0–0.6)
KETONES UR QL STRIP: NEGATIVE
LEUKOCYTE ESTERASE UR QL STRIP.AUTO: ABNORMAL
LYMPHOCYTES # BLD AUTO: 1.37 10*3/MM3 (ref 0.6–4.8)
LYMPHOCYTES NFR BLD AUTO: 11.2 % (ref 24–44)
MAGNESIUM SERPL-MCNC: 1.5 MG/DL (ref 1.3–2.7)
MCH RBC QN AUTO: 25.6 PG (ref 27–31)
MCHC RBC AUTO-ENTMCNC: 29.8 G/DL (ref 32–36)
MCV RBC AUTO: 86 FL (ref 80–99)
MONOCYTES # BLD AUTO: 0.85 10*3/MM3 (ref 0–1)
MONOCYTES NFR BLD AUTO: 7 % (ref 0–12)
NEUTROPHILS # BLD AUTO: 9.54 10*3/MM3 (ref 1.5–8.3)
NEUTROPHILS NFR BLD AUTO: 78.1 % (ref 41–71)
NITRITE UR QL STRIP: NEGATIVE
PH UR STRIP.AUTO: 7 [PH] (ref 5–8)
PLATELET # BLD AUTO: 327 10*3/MM3 (ref 150–450)
PMV BLD AUTO: 10 FL (ref 6–12)
POTASSIUM BLD-SCNC: 4.3 MMOL/L (ref 3.5–5.5)
PROT SERPL-MCNC: 7.1 G/DL (ref 5.7–8.2)
PROT UR QL STRIP: NEGATIVE
RBC # BLD AUTO: 4.37 10*6/MM3 (ref 3.89–5.14)
RBC # UR: ABNORMAL /HPF
REF LAB TEST METHOD: ABNORMAL
SODIUM BLD-SCNC: 134 MMOL/L (ref 132–146)
SP GR UR STRIP: <=1.005 (ref 1–1.03)
SQUAMOUS #/AREA URNS HPF: ABNORMAL /HPF
TRANS CELLS #/AREA URNS HPF: ABNORMAL /HPF
TROPONIN I SERPL-MCNC: 0.01 NG/ML (ref 0–0.07)
TROPONIN I SERPL-MCNC: 0.01 NG/ML (ref 0–0.07)
UROBILINOGEN UR QL STRIP: ABNORMAL
WBC NRBC COR # BLD: 12.22 10*3/MM3 (ref 3.5–10.8)
WBC UR QL AUTO: ABNORMAL /HPF
WHOLE BLOOD HOLD SPECIMEN: NORMAL
WHOLE BLOOD HOLD SPECIMEN: NORMAL

## 2017-11-16 PROCEDURE — 93005 ELECTROCARDIOGRAM TRACING: CPT | Performed by: EMERGENCY MEDICINE

## 2017-11-16 PROCEDURE — 81001 URINALYSIS AUTO W/SCOPE: CPT | Performed by: EMERGENCY MEDICINE

## 2017-11-16 PROCEDURE — 96360 HYDRATION IV INFUSION INIT: CPT

## 2017-11-16 PROCEDURE — 87086 URINE CULTURE/COLONY COUNT: CPT | Performed by: EMERGENCY MEDICINE

## 2017-11-16 PROCEDURE — 96361 HYDRATE IV INFUSION ADD-ON: CPT

## 2017-11-16 PROCEDURE — 99285 EMERGENCY DEPT VISIT HI MDM: CPT

## 2017-11-16 PROCEDURE — 87040 BLOOD CULTURE FOR BACTERIA: CPT | Performed by: NURSE PRACTITIONER

## 2017-11-16 PROCEDURE — 83605 ASSAY OF LACTIC ACID: CPT | Performed by: NURSE PRACTITIONER

## 2017-11-16 PROCEDURE — 80053 COMPREHEN METABOLIC PANEL: CPT | Performed by: EMERGENCY MEDICINE

## 2017-11-16 PROCEDURE — 71010 HC CHEST PA OR AP: CPT

## 2017-11-16 PROCEDURE — 85025 COMPLETE CBC W/AUTO DIFF WBC: CPT | Performed by: EMERGENCY MEDICINE

## 2017-11-16 PROCEDURE — 83735 ASSAY OF MAGNESIUM: CPT | Performed by: EMERGENCY MEDICINE

## 2017-11-16 PROCEDURE — 82962 GLUCOSE BLOOD TEST: CPT

## 2017-11-16 PROCEDURE — 84484 ASSAY OF TROPONIN QUANT: CPT

## 2017-11-16 RX ORDER — SODIUM CHLORIDE 9 MG/ML
125 INJECTION, SOLUTION INTRAVENOUS CONTINUOUS
Status: DISCONTINUED | OUTPATIENT
Start: 2017-11-16 | End: 2017-11-16 | Stop reason: HOSPADM

## 2017-11-16 RX ORDER — SODIUM CHLORIDE 0.9 % (FLUSH) 0.9 %
10 SYRINGE (ML) INJECTION AS NEEDED
Status: DISCONTINUED | OUTPATIENT
Start: 2017-11-16 | End: 2017-11-16 | Stop reason: HOSPADM

## 2017-11-16 RX ADMIN — SODIUM CHLORIDE 125 ML/HR: 9 INJECTION, SOLUTION INTRAVENOUS at 14:28

## 2017-11-16 NOTE — TELEPHONE ENCOUNTER
[facility] Nurse calls to report that pt is having some issues today- she is cool and clammy and not feeling well- her BP is 170/74 HR 90's Oxygen 90's- went I opened up her chart I seen that she is in the ER at this time- will stand by

## 2017-11-16 NOTE — ED PROVIDER NOTES
"Subjective   HPI Comments: Patient presents to the emergency department in the company of her  who reports on her behalf that she has been having dizziness whether standing or lying flat with frequent dry heaving since initiating a medication 2 days ago for \"head cold\".  Patient was started on Mucinex D just prior to onset of these symptoms.  Further history reveals that the patient was recently hospitalized for sepsis, urinary tract infection and respiratory failure.  She has required much home health care since October.  She is on oxygen 24/ 7 now.  She denies any vomiting but adds \"I'm just gagging\".  She has been eating and drinking well.  She's had no fever.  She has no abdominal pain.  She denies any neurosensory complaints or focal weakness.  She denies chest pain or shortness of breath    Patient is a 83 y.o. female presenting with dizziness.   History provided by:  Patient and spouse   used: No    Dizziness   Quality:  Head spinning and room spinning  Severity:  Moderate  Onset quality:  Sudden  Duration:  3 days  Timing:  Intermittent  Progression:  Waxing and waning  Chronicity:  New  Context: bending over and bowel movement    Relieved by:  Nothing  Exacerbated by: Mucinex D.  Ineffective treatments:  None tried  Associated symptoms: nausea and vomiting    Associated symptoms: no blood in stool, no chest pain, no diarrhea, no headaches, no palpitations, no shortness of breath, no syncope and no vision changes        Review of Systems   Constitutional: Negative.  Negative for diaphoresis and fever.   HENT: Negative.  Negative for sore throat.    Eyes: Negative.  Negative for pain and visual disturbance.   Respiratory: Negative for cough, shortness of breath, wheezing and stridor.    Cardiovascular: Negative.  Negative for chest pain, palpitations and syncope.   Gastrointestinal: Positive for nausea and vomiting. Negative for abdominal pain, blood in stool and diarrhea.        " "Patient denies any \"vomiting\" but insist she is only \"dry heaving\" or gagging   Endocrine: Negative.    Genitourinary: Negative.  Negative for dysuria, flank pain and pelvic pain.   Musculoskeletal: Negative.  Negative for back pain and neck pain.   Skin: Negative.  Negative for pallor and rash.   Allergic/Immunologic: Negative.    Neurological: Positive for dizziness. Negative for syncope and headaches.   Hematological: Negative.  Does not bruise/bleed easily.   Psychiatric/Behavioral: Negative.  Negative for agitation and confusion.   All other systems reviewed and are negative.      Past Medical History:   Diagnosis Date   • Anxiety    • Arthritis    • Constipation    • Coronary artery disease    • Depression    • Dyslipidemia    • Hypertension    • IBS (irritable bowel syndrome)    • Ischemic heart disease    • Morbid obesity     ; BMI > 40.     • Myocardial infarction    • Osteoarthritis    • PFO (patent foramen ovale)     PFO versus small ASD (echocardiogram on 05/06/2011) - asymptomatic.         Allergies   Allergen Reactions   • Codeine    • Morphine And Related        Past Surgical History:   Procedure Laterality Date   • ANAL FISSURECTOMY/FISTULECTOMY  10/2002   • ANAL FISTULA REPAIR     • ANKLE SURGERY Left 1988   • ANKLE SURGERY Left 11/2001    ANKLE SCREW AND BONE CHIP REMOVAL    • CATARACT EXTRACTION     • CHOLECYSTECTOMY  05/2000   • FOOT SURGERY  1988    RIGHT HEEL SPUR    • HEMORRHOIDECTOMY  1996   • KNEE ARTHROPLASTY Left 10/2007   • OTHER SURGICAL HISTORY Right 05/2005    Flank abcess (staph)       Family History   Problem Relation Age of Onset   • Breast cancer Neg Hx    • Ovarian cancer Neg Hx        Social History     Social History   • Marital status:      Spouse name: N/A   • Number of children: N/A   • Years of education: N/A     Social History Main Topics   • Smoking status: Never Smoker   • Smokeless tobacco: Never Used   • Alcohol use No   • Drug use: No   • Sexual activity: Defer "     Other Topics Concern   • None     Social History Narrative    Patient lives at home with spouse.               Objective   Physical Exam   Constitutional: She is oriented to person, place, and time. She appears well-developed and well-nourished. No distress.   HENT:   Head: Normocephalic and atraumatic.   Mouth/Throat: Oropharynx is clear and moist. No oropharyngeal exudate.   Eyes: Conjunctivae and EOM are normal. Pupils are equal, round, and reactive to light. No scleral icterus.   Neck: Normal range of motion. Neck supple.   Cardiovascular: Normal rate and regular rhythm.    Pulmonary/Chest: Effort normal and breath sounds normal. No respiratory distress. She has no wheezes. She has no rales.   Abdominal: Soft. Bowel sounds are normal. She exhibits no distension. There is no tenderness. There is no rebound and no guarding.   Musculoskeletal: Normal range of motion. She exhibits no edema or tenderness.   Neurological: She is alert and oriented to person, place, and time. She exhibits normal muscle tone. Coordination normal.   Skin: Skin is warm and dry. No rash noted. She is not diaphoretic. No erythema. No pallor.   Psychiatric: She has a normal mood and affect. Her behavior is normal.   Nursing note and vitals reviewed.      Procedures         ED Course  ED Course   Value Comment By Time   Squamous Epithelial Cells, UA: (!) 13-20 (Reviewed) Queenie Martinez, APRN 11/16 5913    Patient's blood pressure has improved.  She is not hypotensive, tachycardic, hypoxic, or febrile, chest x-ray is negative.  Urinalysis is negative for urinary tract infection.  Patient has had no vomiting since her arrival.  Currently, she is sitting upright and eating a sandwich and fruit.  She has no abdominal pain.  I discussed plans for outpatient management to include follow-up as well as discontinuation of Mucinex D.  Patient and  understand and concur with this plan of care while understanding also parameters for  concern that would warrant return to the emergency department Queenie Ascencio Juan, APRN 11/16 1615      Recent Results (from the past 24 hour(s))   Comprehensive Metabolic Panel    Collection Time: 11/16/17 12:55 PM   Result Value Ref Range    Glucose 108 (H) 70 - 100 mg/dL    BUN 17 9 - 23 mg/dL    Creatinine 0.80 0.60 - 1.30 mg/dL    Sodium 134 132 - 146 mmol/L    Potassium 4.3 3.5 - 5.5 mmol/L    Chloride 95 (L) 99 - 109 mmol/L    CO2 29.0 20.0 - 31.0 mmol/L    Calcium 10.0 8.7 - 10.4 mg/dL    Total Protein 7.1 5.7 - 8.2 g/dL    Albumin 4.10 3.20 - 4.80 g/dL    ALT (SGPT) 19 7 - 40 U/L    AST (SGOT) 31 0 - 33 U/L    Alkaline Phosphatase 92 25 - 100 U/L    Total Bilirubin 0.4 0.3 - 1.2 mg/dL    eGFR Non African Amer 69 >60 mL/min/1.73    Globulin 3.0 gm/dL    A/G Ratio 1.4 (L) 1.5 - 2.5 g/dL    BUN/Creatinine Ratio 21.3 7.0 - 25.0    Anion Gap 10.0 3.0 - 11.0 mmol/L   Magnesium    Collection Time: 11/16/17 12:55 PM   Result Value Ref Range    Magnesium 1.5 1.3 - 2.7 mg/dL   Light Blue Top    Collection Time: 11/16/17 12:55 PM   Result Value Ref Range    Extra Tube hold for add-on    Green Top (Gel)    Collection Time: 11/16/17 12:55 PM   Result Value Ref Range    Extra Tube Hold for add-ons.    Lavender Top    Collection Time: 11/16/17 12:55 PM   Result Value Ref Range    Extra Tube hold for add-on    Gold Top - SST    Collection Time: 11/16/17 12:55 PM   Result Value Ref Range    Extra Tube Hold for add-ons.    CBC Auto Differential    Collection Time: 11/16/17 12:55 PM   Result Value Ref Range    WBC 12.22 (H) 3.50 - 10.80 10*3/mm3    RBC 4.37 3.89 - 5.14 10*6/mm3    Hemoglobin 11.2 (L) 11.5 - 15.5 g/dL    Hematocrit 37.6 34.5 - 44.0 %    MCV 86.0 80.0 - 99.0 fL    MCH 25.6 (L) 27.0 - 31.0 pg    MCHC 29.8 (L) 32.0 - 36.0 g/dL    RDW 15.3 (H) 11.3 - 14.5 %    RDW-SD 48.6 37.0 - 54.0 fl    MPV 10.0 6.0 - 12.0 fL    Platelets 327 150 - 450 10*3/mm3    Neutrophil % 78.1 (H) 41.0 - 71.0 %    Lymphocyte % 11.2 (L) 24.0  - 44.0 %    Monocyte % 7.0 0.0 - 12.0 %    Eosinophil % 3.1 (H) 0.0 - 3.0 %    Basophil % 0.2 0.0 - 1.0 %    Immature Grans % 0.4 0.0 - 0.6 %    Neutrophils, Absolute 9.54 (H) 1.50 - 8.30 10*3/mm3    Lymphocytes, Absolute 1.37 0.60 - 4.80 10*3/mm3    Monocytes, Absolute 0.85 0.00 - 1.00 10*3/mm3    Eosinophils, Absolute 0.38 (H) 0.00 - 0.30 10*3/mm3    Basophils, Absolute 0.03 0.00 - 0.20 10*3/mm3    Immature Grans, Absolute 0.05 (H) 0.00 - 0.03 10*3/mm3   POC Glucose Fingerstick    Collection Time: 11/16/17 12:55 PM   Result Value Ref Range    Glucose 105 70 - 130 mg/dL   POC Troponin, Rapid    Collection Time: 11/16/17  1:04 PM   Result Value Ref Range    Troponin I 0.01 0.00 - 0.07 ng/mL   Urinalysis With / Culture If Indicated - Urine, Clean Catch    Collection Time: 11/16/17  1:10 PM   Result Value Ref Range    Color, UA Yellow Yellow, Straw    Appearance, UA Clear Clear    pH, UA 7.0 5.0 - 8.0    Specific Gravity, UA <=1.005 1.001 - 1.030    Glucose, UA Negative Negative    Ketones, UA Negative Negative    Bilirubin, UA Negative Negative    Blood, UA Negative Negative    Protein, UA Negative Negative    Leuk Esterase, UA Small (1+) (A) Negative    Nitrite, UA Negative Negative    Urobilinogen, UA 0.2 E.U./dL 0.2 - 1.0 E.U./dL   Urinalysis, Microscopic Only - Urine, Clean Catch    Collection Time: 11/16/17  1:10 PM   Result Value Ref Range    RBC, UA None Seen None Seen, 0-2 /HPF    WBC, UA None Seen None Seen /HPF    Bacteria, UA None Seen None Seen, Trace /HPF    Squamous Epithelial Cells, UA 13-20 (A) None Seen, 0-2 /HPF    Transitional Epithelial Cells, UA 3-6 (A) 0 - 2 /HPF    Hyaline Casts, UA 0-6 0 - 6 /LPF    Methodology Manual Light Microscopy    Lactic Acid, Plasma    Collection Time: 11/16/17  2:05 PM   Result Value Ref Range    Lactate 1.1 0.5 - 2.0 mmol/L   POC Troponin, Rapid    Collection Time: 11/16/17  3:00 PM   Result Value Ref Range    Troponin I 0.01 0.00 - 0.07 ng/mL     Note: In addition  to lab results from this visit, the labs listed above may include labs taken at another facility or during a different encounter within the last 24 hours. Please correlate lab times with ED admission and discharge times for further clarification of the services performed during this visit.    XR Chest 1 View   Final Result   Low lung volumes with hypoventilatory changes including   vascular crowding and bibasilar opacities greatest on the left may   represent atelectasis and/or airspace disease. Enlarged cardiac   silhouette similar to prior comparison.       D:  11/16/2017   E:  11/16/2017       This report was finalized on 11/16/2017 3:47 PM by Dr. Christiano Cabrera.            Vitals:    11/16/17 1531 11/16/17 1602 11/16/17 1606 11/16/17 1630   BP:  136/79  140/78   Pulse: 82 88  90   Resp:  18     Temp:   98.8 °F (37.1 °C)    TempSrc:   Oral    SpO2: 98% 96%  98%   Weight:       Height:         Medications   sodium chloride 0.9 % flush 10 mL (not administered)   sodium chloride 0.9 % infusion (0 mL/hr Intravenous Stopped 11/16/17 1641)     ECG/EMG Results (last 24 hours)     Procedure Component Value Units Date/Time    ECG 12 Lead [[id number]] Collected:  11/16/17 1243     Updated:  11/16/17 1323                    MDM    Final diagnoses:   Dizziness   Non-intractable vomiting, presence of nausea not specified, unspecified vomiting type   Medication side effect, initial encounter            Queenie Martinez, ALAN  11/16/17 [id number]

## 2017-11-16 NOTE — DISCHARGE INSTRUCTIONS
Continue current medications with exception of Mucinex D.  Do not take it any further.  Follow-up with your primary care provider to monitor your recovery.  Return to the emergency department as needed for worsening symptoms or concerns.  Thank you

## 2017-11-18 LAB
BACTERIA SPEC AEROBE CULT: NORMAL
BACTERIA SPEC AEROBE CULT: NORMAL

## 2017-11-21 ENCOUNTER — TELEPHONE (OUTPATIENT)
Dept: CARDIOLOGY | Facility: CLINIC | Age: 82
End: 2017-11-21

## 2017-11-21 LAB
BACTERIA SPEC AEROBE CULT: NORMAL
BACTERIA SPEC AEROBE CULT: NORMAL

## 2017-11-21 NOTE — TELEPHONE ENCOUNTER
nurse calls to requesting refills on metolazone and spironolactone- they were called in to the pharmacy on 10.26.2017- she is also questioning PT being on lisinopril - she was discharged from the hospital and [place] off lisinopril - her BP at the last office visit was good and she was not on lisinopril- her family restarted the med on their own- I explained to Kathryn ( nurse) thatwe did not restart the med and if she continued it, it would be on their own decision-

## 2018-02-07 ENCOUNTER — OFFICE VISIT (OUTPATIENT)
Dept: CARDIOLOGY | Facility: CLINIC | Age: 83
End: 2018-02-07

## 2018-02-07 VITALS
HEIGHT: 60 IN | DIASTOLIC BLOOD PRESSURE: 70 MMHG | HEART RATE: 63 BPM | WEIGHT: 199 LBS | BODY MASS INDEX: 39.07 KG/M2 | SYSTOLIC BLOOD PRESSURE: 114 MMHG

## 2018-02-07 DIAGNOSIS — R60.9 EDEMA, UNSPECIFIED TYPE: ICD-10-CM

## 2018-02-07 DIAGNOSIS — I10 HYPERTENSION, UNSPECIFIED TYPE: ICD-10-CM

## 2018-02-07 DIAGNOSIS — I25.10 CORONARY ARTERY DISEASE INVOLVING NATIVE HEART WITHOUT ANGINA PECTORIS, UNSPECIFIED VESSEL OR LESION TYPE: Primary | Chronic | ICD-10-CM

## 2018-02-07 DIAGNOSIS — E78.5 DYSLIPIDEMIA: ICD-10-CM

## 2018-02-07 PROCEDURE — 99213 OFFICE O/P EST LOW 20 MIN: CPT | Performed by: INTERNAL MEDICINE

## 2018-02-07 RX ORDER — OXYBUTYNIN CHLORIDE 10 MG/1
10 TABLET, EXTENDED RELEASE ORAL DAILY
Refills: 2 | COMMUNITY
Start: 2018-01-11

## 2018-02-07 RX ORDER — PROPRANOLOL HYDROCHLORIDE 20 MG/1
20 TABLET ORAL DAILY
COMMUNITY

## 2018-02-07 NOTE — PROGRESS NOTES
Iliana Oleary  1934  [phone]      02/07/2018    Sreekanth Shi, APRN    Chief Complaint: Coronary Artery Disease and Ischemic heart disease      PROBLEM LIST:  1.  Coronary artery disease:  a. Cardiac catheterization: Dr. Warner,  01/06/2009.   i. PCI to proximal LAD (2.75 x 28 mm Promus CIRO).     b. Cardiac catheterization: Dr. Mejia, 08/03/2009: No in-stent restenosis or coronary artery obstruction; normal LV function.  c. Adenosine Cardiolite stress test, 04/30/2014:  Negative for ischemia and scar; LVEF (73%) without WMA.   d. Stress test with PET, 7/11/2016: EF 67%. No evidence of inducible ischemia by scintigraphic criteria.  e. Echocardiogram, 7/11/2016: EF 60%. Mild MR. Mild TR.  f. Echocardiogram, 9/19/2017: EF >70. Moderate concentric hypertrophy. Diastolic dysfunction. RV mildly dilated with reduced systolic function. Relative systolic sparing of the apex. RV:LV ratio is 1.0.   2. Hypertension.   3. Edema  a. Duplex Venous Lower Extremity, 9/19/17. Acute right lower extremity deep vein thrombosis noted in the common femoral, proximal femoral and peroneal. Acute right lower extremity superficial thrombosis noted in the saphenofemoral junction. All other veins appeared normal bilaterally.   4. Dyslipidemia.   5. PFO versus small ASD (echocardiogram on 05/06/2011) - asymptomatic.    6. Morbid obesity; BMI > 40.    7. Osteoarthritis.   8. Status post surgery, remote:  a. Hemorrhoidectomy, 1966.  b. Right  heel spur surgery, 1988.  c. Left ankle fracture stabilization, 1988.  d. Left hand trigger finger; Right hand trigger finger repair, 1996.    e. Cholecystectomy, May 2000.  f. Left ankle screw and bone chip removal, November 2001.  g. Anal fistulectomy,  October 2002.  h. Right flank abscess (staph), May 2005.  i. Left total knee replacement, October 2007.  j. Right knee replacement, August 2016    Allergies   Allergen Reactions   • Codeine Nausea And Vomiting   • Morphine And Related  Nausea And Vomiting       Current Medications:    Current Outpatient Prescriptions:   •  acetaminophen (TYLENOL) 325 MG tablet, Take 2 tablets by mouth Every 6 (Six) Hours As Needed for Mild Pain ., Disp: , Rfl:   •  apixaban (ELIQUIS) 5 MG tablet tablet, Take 5 mg by mouth 2 (Two) Times a Day., Disp: , Rfl:   •  aspirin 81 MG EC tablet, Take 81 mg by mouth every morning., Disp: , Rfl:   •  atorvastatin (LIPITOR) 40 MG tablet, Take 40 mg by mouth daily., Disp: , Rfl:   •  Budesonide (ENTOCORT EC) 3 MG 24 hr capsule, Take 3 mg by mouth Daily., Disp: , Rfl:   •  bumetanide (BUMEX) 2 MG tablet, Take 2 mg by mouth Daily., Disp: , Rfl:   •  busPIRone (BUSPAR) 5 MG tablet, Take 1 tablet by mouth Every 12 (Twelve) Hours As Needed (Anxiety)., Disp: , Rfl:   •  DICLOFENAC PO, Take 100 mg by mouth As Needed., Disp: , Rfl:   •  FLUoxetine (PROzac) 20 MG capsule, Take 20 mg by mouth daily., Disp: , Rfl:   •  fluticasone (FLONASE) 50 MCG/ACT nasal spray, 1 spray by Each Nare route 2 (Two) Times a Day As Needed for Rhinitis or Allergies., Disp: , Rfl:   •  lansoprazole (PREVACID) 30 MG capsule, Take 30 mg by mouth daily., Disp: , Rfl:   •  loperamide (IMODIUM) 2 MG capsule, As Needed., Disp: , Rfl:   •  metOLazone (ZAROXOLYN) 2.5 MG tablet, Take 1 tablet by mouth Daily., Disp: 30 tablet, Rfl: 11  •  Multiple Vitamins-Minerals (MULTIVITAMIN PO), Take 1 tablet by mouth daily., Disp: , Rfl:   •  O2 (OXYGEN), Inhale 3 L/min Daily. 24/7, Disp: , Rfl:   •  oxybutynin XL (DITROPAN-XL) 10 MG 24 hr tablet, TK 1 T PO HS, Disp: , Rfl: 2  •  propranolol (INDERAL) 20 MG tablet, Take 20 mg by mouth Daily., Disp: , Rfl:   •  spironolactone (ALDACTONE) 50 MG tablet, Take 1 tablet by mouth Daily., Disp: 30 tablet, Rfl: 11  •  traMADol (ULTRAM) 50 MG tablet, As Needed., Disp: , Rfl: 0    HPI  Iliana Oleary returns today for follow up with a history of coronary artery disease, hypertension, and dyslipidemia. Since last visit, patient says she  "has been doing well. She says that she is following her oxygen level at home, but she forgot to bring her list in. Her oxygen level has been around 94%. She reports issues with her feet and hands going numb during the day. She has not checked her BP, heart rate, or oxygen level when this happens. She is concerned because she says this happens frequently. She usually experiences this numbness in both right and left sides, however more so on her left. Her lips have also been getting numb. She reports dizziness when she rolls over in bed at night to turn off the lamp before sleeping. She is also losing hair. She mentions that she is having trouble sleeping at night because she has to get up every hour to urinate. She reports that they even had to put a [id number] in her bedroom. Denies any complaints of chest pain, pressure, tightness, or unusual dyspnea. She goes to see a rheumatologist next week.      The following portions of the patient's history were reviewed and updated as appropriate: allergies, current medications and problem list.    Pertinent positives as listed in the HPI.  All other systems reviewed are negative.    Vitals:    02/07/18 1316   BP: 114/70   BP Location: Left arm   Patient Position: Sitting   Pulse: 63   Weight: 90.3 kg (199 lb)   Height: 152.4 cm (60\")       General: Alert, awake, and oriented  Neck: Jugular venous pressure is within normal limits. Carotids have normal upstrokes without bruits.   Cardiovascular: Heart has a nondisplaced focal PMI. Regular rate and rhythm without murmur, gallop or rub.  Lungs: Clear without rales or wheezes. Equal expansion is noted.   Extremities: Show trace edema.  Skin: Warm and dry.  Neurologic: nonfocal    Diagnostic Data:  Lab Results   Component Value Date    CKTOTAL 60 09/23/2017    TROPONINI 0.021 09/18/2017     Lab Results   Component Value Date    GLUCOSE 108 (H) 11/16/2017    BUN 17 11/16/2017    CREATININE 0.80 11/16/2017    EGFRIFNONA 69 " 11/16/2017    BCR 21.3 11/16/2017    K 4.3 11/16/2017    CO2 29.0 11/16/2017    CALCIUM 10.0 11/16/2017    PROTENTOTREF 5.1 (L) 09/20/2017    ALBUMIN 4.10 11/16/2017    LABIL2 1.4 (L) 11/16/2017    AST 31 11/16/2017    ALT 19 11/16/2017     Lab Results   Component Value Date    WBC 12.22 (H) 11/16/2017    HGB 11.2 (L) 11/16/2017    HCT 37.6 11/16/2017    MCV 86.0 11/16/2017     11/16/2017       Procedures    Assessment:    ICD-10-CM ICD-9-CM   1. Coronary artery disease involving native heart without angina pectoris, unspecified vessel or lesion type I25.10 414.01   2. Hypertension, unspecified type I10 401.9   3. Dyslipidemia E78.5 272.4   4. Edema, unspecified type R60.9 782.3       Plan:    1. Advised to practice blowing up party balloons Or use incentive spirometer to increase lung strength so that she can go off of oxygen.  2. Advised to stop using oxygen during the day as long as oxygen level is above 90%.  3. Consider seeing a neurologist about hand/foot/lip numbness  4. Consider stopping indural to try to reduce symptoms of hair loss   5. Talk with Sreekanth Shi about Detrol XL, which should be helping decrease urination   6. Continue current medications.  7. F/up in 6 months or sooner if needed.      Scribed for Tracy Diego MD by Lilo Abdi. 2/7/2018  1:30 PM    I Tracy Diego MD personally performed the services described in this documentation as scribed by the above individual in my presence, and it is both accurate and complete.    Tracy Diego MD, FACC

## 2018-06-13 ENCOUNTER — APPOINTMENT (OUTPATIENT)
Dept: GENERAL RADIOLOGY | Facility: HOSPITAL | Age: 83
End: 2018-06-13

## 2018-06-13 ENCOUNTER — HOSPITAL ENCOUNTER (EMERGENCY)
Facility: HOSPITAL | Age: 83
Discharge: HOME | End: 2018-06-13
Attending: EMERGENCY MEDICINE | Admitting: EMERGENCY MEDICINE

## 2018-06-13 VITALS
HEIGHT: 61 IN | TEMPERATURE: 98.3 F | SYSTOLIC BLOOD PRESSURE: 134 MMHG | OXYGEN SATURATION: 99 % | RESPIRATION RATE: 16 BRPM | HEART RATE: 73 BPM | DIASTOLIC BLOOD PRESSURE: 76 MMHG | BODY MASS INDEX: 34.17 KG/M2 | WEIGHT: 181 LBS

## 2018-06-13 DIAGNOSIS — J44.9 CHRONIC OBSTRUCTIVE PULMONARY DISEASE, UNSPECIFIED COPD TYPE (HCC): ICD-10-CM

## 2018-06-13 DIAGNOSIS — R06.02 SHORTNESS OF BREATH: Primary | ICD-10-CM

## 2018-06-13 DIAGNOSIS — F41.9 ANXIETY: ICD-10-CM

## 2018-06-13 LAB
ALBUMIN SERPL-MCNC: 3.86 G/DL (ref 3.2–4.8)
ALBUMIN/GLOB SERPL: 1.5 G/DL (ref 1.5–2.5)
ALP SERPL-CCNC: 78 U/L (ref 25–100)
ALT SERPL W P-5'-P-CCNC: 13 U/L (ref 7–40)
ANION GAP SERPL CALCULATED.3IONS-SCNC: 9 MMOL/L (ref 3–11)
AST SERPL-CCNC: 23 U/L (ref 0–33)
BASOPHILS # BLD AUTO: 0.04 10*3/MM3 (ref 0–0.2)
BASOPHILS NFR BLD AUTO: 0.4 % (ref 0–1)
BILIRUB SERPL-MCNC: 0.5 MG/DL (ref 0.3–1.2)
BNP SERPL-MCNC: 38 PG/ML (ref 0–100)
BUN BLD-MCNC: 32 MG/DL (ref 9–23)
BUN/CREAT SERPL: 31.4 (ref 7–25)
CALCIUM SPEC-SCNC: 9.3 MG/DL (ref 8.7–10.4)
CHLORIDE SERPL-SCNC: 103 MMOL/L (ref 99–109)
CO2 SERPL-SCNC: 25 MMOL/L (ref 20–31)
CREAT BLD-MCNC: 1.02 MG/DL (ref 0.6–1.3)
DEPRECATED RDW RBC AUTO: 43.9 FL (ref 37–54)
EOSINOPHIL # BLD AUTO: 0.24 10*3/MM3 (ref 0–0.3)
EOSINOPHIL NFR BLD AUTO: 2.2 % (ref 0–3)
ERYTHROCYTE [DISTWIDTH] IN BLOOD BY AUTOMATED COUNT: 14.2 % (ref 11.3–14.5)
GFR SERPL CREATININE-BSD FRML MDRD: 52 ML/MIN/1.73
GLOBULIN UR ELPH-MCNC: 2.6 GM/DL
GLUCOSE BLD-MCNC: 95 MG/DL (ref 70–100)
HCT VFR BLD AUTO: 35.9 % (ref 34.5–44)
HGB BLD-MCNC: 11.4 G/DL (ref 11.5–15.5)
HOLD SPECIMEN: NORMAL
HOLD SPECIMEN: NORMAL
IMM GRANULOCYTES # BLD: 0.03 10*3/MM3 (ref 0–0.03)
IMM GRANULOCYTES NFR BLD: 0.3 % (ref 0–0.6)
INR PPP: 1.01 (ref 0.91–1.09)
LYMPHOCYTES # BLD AUTO: 1.9 10*3/MM3 (ref 0.6–4.8)
LYMPHOCYTES NFR BLD AUTO: 17.1 % (ref 24–44)
MCH RBC QN AUTO: 27 PG (ref 27–31)
MCHC RBC AUTO-ENTMCNC: 31.8 G/DL (ref 32–36)
MCV RBC AUTO: 84.9 FL (ref 80–99)
MONOCYTES # BLD AUTO: 0.82 10*3/MM3 (ref 0–1)
MONOCYTES NFR BLD AUTO: 7.4 % (ref 0–12)
NEUTROPHILS # BLD AUTO: 8.1 10*3/MM3 (ref 1.5–8.3)
NEUTROPHILS NFR BLD AUTO: 72.9 % (ref 41–71)
PLATELET # BLD AUTO: 298 10*3/MM3 (ref 150–450)
PMV BLD AUTO: 10 FL (ref 6–12)
POTASSIUM BLD-SCNC: 3.8 MMOL/L (ref 3.5–5.5)
PROT SERPL-MCNC: 6.5 G/DL (ref 5.7–8.2)
PROTHROMBIN TIME: 10.6 SECONDS (ref 9.6–11.5)
RBC # BLD AUTO: 4.23 10*6/MM3 (ref 3.89–5.14)
SODIUM BLD-SCNC: 137 MMOL/L (ref 132–146)
TROPONIN I SERPL-MCNC: 0 NG/ML (ref 0–0.07)
WBC NRBC COR # BLD: 11.1 10*3/MM3 (ref 3.5–10.8)
WHOLE BLOOD HOLD SPECIMEN: NORMAL
WHOLE BLOOD HOLD SPECIMEN: NORMAL

## 2018-06-13 PROCEDURE — 85610 PROTHROMBIN TIME: CPT | Performed by: EMERGENCY MEDICINE

## 2018-06-13 PROCEDURE — 71045 X-RAY EXAM CHEST 1 VIEW: CPT

## 2018-06-13 PROCEDURE — 94640 AIRWAY INHALATION TREATMENT: CPT

## 2018-06-13 PROCEDURE — 93005 ELECTROCARDIOGRAM TRACING: CPT | Performed by: EMERGENCY MEDICINE

## 2018-06-13 PROCEDURE — 80053 COMPREHEN METABOLIC PANEL: CPT | Performed by: EMERGENCY MEDICINE

## 2018-06-13 PROCEDURE — 84484 ASSAY OF TROPONIN QUANT: CPT

## 2018-06-13 PROCEDURE — 83880 ASSAY OF NATRIURETIC PEPTIDE: CPT | Performed by: EMERGENCY MEDICINE

## 2018-06-13 PROCEDURE — 85025 COMPLETE CBC W/AUTO DIFF WBC: CPT | Performed by: EMERGENCY MEDICINE

## 2018-06-13 PROCEDURE — 99285 EMERGENCY DEPT VISIT HI MDM: CPT

## 2018-06-13 RX ORDER — IPRATROPIUM BROMIDE AND ALBUTEROL SULFATE 2.5; .5 MG/3ML; MG/3ML
3 SOLUTION RESPIRATORY (INHALATION) ONCE
Status: COMPLETED | OUTPATIENT
Start: 2018-06-13 | End: 2018-06-13

## 2018-06-13 RX ORDER — DIAZEPAM 5 MG/1
5 TABLET ORAL ONCE
Status: COMPLETED | OUTPATIENT
Start: 2018-06-13 | End: 2018-06-13

## 2018-06-13 RX ORDER — SODIUM CHLORIDE 0.9 % (FLUSH) 0.9 %
10 SYRINGE (ML) INJECTION AS NEEDED
Status: DISCONTINUED | OUTPATIENT
Start: 2018-06-13 | End: 2018-06-13 | Stop reason: HOSPADM

## 2018-06-13 RX ADMIN — DIAZEPAM 5 MG: 5 TABLET ORAL at 11:54

## 2018-06-13 RX ADMIN — IPRATROPIUM BROMIDE AND ALBUTEROL SULFATE 3 ML: 2.5; .5 SOLUTION RESPIRATORY (INHALATION) at 10:23

## 2018-06-13 NOTE — ED NOTES
PATIENT'S FAMILY IS VISIBLE FRUSTRATED AND IRRITATED.  ASKED FAMILY MEMBER IF I COULD HELP.  FAMILY STATES THE PATIENT DID NOT TAKE HER MEDS THIS MORNING AND IS REQUESTING THOSE MEDS.  FAMILY STATES THEY ARE NOT AGGRAVATED WITH STAFF BUT WITH PATIENT.     Cachorro Burnett  06/13/18 1146

## 2018-06-13 NOTE — ED PROVIDER NOTES
Subjective   Woke up with SOA after a bad dream. She had a friend die recently and this has really upset her (pt cries when she starts to talk about it)    No cp. No abd pain.    Hx of lung disease and is on O2 continuously.         Shortness of Breath   Severity:  Moderate  Onset quality:  Sudden  Duration:  2 hours  Progression:  Improving  Chronicity:  Chronic  Context: activity    Relieved by:  Rest and oxygen  Worsened by:  Exertion and emotional stress  Ineffective treatments:  None tried  Associated symptoms: wheezing    Associated symptoms: no abdominal pain, no chest pain, no cough, no diaphoresis, no fever, no headaches, no sore throat and no vomiting        Review of Systems   Constitutional: Negative for diaphoresis and fever.   HENT: Negative for congestion and sore throat.    Respiratory: Positive for shortness of breath and wheezing. Negative for cough, choking and chest tightness.    Cardiovascular: Negative for chest pain and leg swelling.   Gastrointestinal: Negative for abdominal distention, abdominal pain, diarrhea, nausea and vomiting.   Neurological: Negative for headaches.   All other systems reviewed and are negative.      Past Medical History:   Diagnosis Date   • Anxiety    • Arthritis    • Constipation    • Coronary artery disease    • Depression    • Dyslipidemia    • Hypertension    • IBS (irritable bowel syndrome)    • Ischemic heart disease    • Morbid obesity     ; BMI > 40.     • Myocardial infarction    • Osteoarthritis    • PFO (patent foramen ovale)     PFO versus small ASD (echocardiogram on 05/06/2011) - asymptomatic.         Allergies   Allergen Reactions   • Codeine Nausea And Vomiting   • Morphine And Related Nausea And Vomiting       Past Surgical History:   Procedure Laterality Date   • ANAL FISSURECTOMY/FISTULECTOMY  10/2002   • ANAL FISTULA REPAIR     • ANKLE SURGERY Left 1988   • ANKLE SURGERY Left 11/2001    ANKLE SCREW AND BONE CHIP REMOVAL    • CATARACT EXTRACTION      • CHOLECYSTECTOMY  05/2000   • FOOT SURGERY  1988    RIGHT HEEL SPUR    • HEMORRHOIDECTOMY  1996   • KNEE ARTHROPLASTY Left 10/2007   • OTHER SURGICAL HISTORY Right 05/2005    Flank abcess (staph)       Family History   Problem Relation Age of Onset   • Breast cancer Neg Hx    • Ovarian cancer Neg Hx        Social History     Social History   • Marital status:      Social History Main Topics   • Smoking status: Never Smoker   • Smokeless tobacco: Never Used   • Alcohol use No   • Drug use: No   • Sexual activity: Defer     Other Topics Concern   • Not on file     Social History Narrative    Patient lives at home with spouse.               Objective   Physical Exam   Constitutional: She is oriented to person, place, and time. She appears well-developed and well-nourished.   HENT:   Head: Normocephalic and atraumatic.   Right Ear: External ear normal.   Left Ear: External ear normal.   Nose: Nose normal.   Mouth/Throat: Oropharynx is clear and moist.   Eyes: Conjunctivae and EOM are normal. Pupils are equal, round, and reactive to light.   Neck: Normal range of motion. Neck supple.   Cardiovascular: Normal rate, regular rhythm, normal heart sounds and intact distal pulses.    Pulmonary/Chest: Effort normal. She has wheezes.   Abdominal: Soft. Bowel sounds are normal.   Musculoskeletal: Normal range of motion.   Neurological: She is alert and oriented to person, place, and time.   Skin: Skin is warm and dry.   Psychiatric: She has a normal mood and affect. Her behavior is normal. Judgment and thought content normal.       Procedures           ED Course  ED Course as of Jun 13 1236   Wed Jun 13, 2018   1232 Feels better. Ready to go home. Family agreeable. Pt thankful and agreeable with tx poc. Well aware of the ss of worsening condition.    [JM]      ED Course User Index  [JM] ALAN Lopes                  [facility]      Final diagnoses:   Shortness of breath   Chronic obstructive pulmonary disease, unspecified  COPD type   Anxiety            Lyle Krishnamurthy, APRN  06/13/18 8618

## 2018-06-28 ENCOUNTER — TRANSCRIBE ORDERS (OUTPATIENT)
Dept: ADMINISTRATIVE | Facility: HOSPITAL | Age: 83
End: 2018-06-28

## 2018-06-28 DIAGNOSIS — Z12.31 VISIT FOR SCREENING MAMMOGRAM: Primary | ICD-10-CM

## 2018-07-12 ENCOUNTER — APPOINTMENT (OUTPATIENT)
Dept: MAMMOGRAPHY | Facility: HOSPITAL | Age: 83
End: 2018-07-12

## 2018-07-17 ENCOUNTER — TELEPHONE (OUTPATIENT)
Dept: CARDIOLOGY | Facility: CLINIC | Age: 83
End: 2018-07-17

## 2018-07-17 NOTE — TELEPHONE ENCOUNTER
Patient requesting cardiac clearance for Lumbar epidural injection on July 24, 2018. Provider requesting to hold Eliquis 5mg BID for 3 days prior to procedure.    Patient also on ASA 81mg EC daily. Patient will be notified to continue ASA therapy.

## 2018-10-01 ENCOUNTER — OFFICE VISIT (OUTPATIENT)
Dept: CARDIOLOGY | Facility: CLINIC | Age: 83
End: 2018-10-01

## 2018-10-01 ENCOUNTER — HOSPITAL ENCOUNTER (OUTPATIENT)
Dept: CARDIOLOGY | Facility: HOSPITAL | Age: 83
Discharge: HOME | End: 2018-10-01
Admitting: NURSE PRACTITIONER

## 2018-10-01 VITALS
HEART RATE: 48 BPM | WEIGHT: 180 LBS | HEIGHT: 61 IN | SYSTOLIC BLOOD PRESSURE: 92 MMHG | BODY MASS INDEX: 33.99 KG/M2 | DIASTOLIC BLOOD PRESSURE: 53 MMHG

## 2018-10-01 DIAGNOSIS — R42 DIZZINESS: Primary | ICD-10-CM

## 2018-10-01 DIAGNOSIS — R60.0 BILATERAL LOWER EXTREMITY EDEMA: ICD-10-CM

## 2018-10-01 DIAGNOSIS — R06.02 SHORTNESS OF BREATH: ICD-10-CM

## 2018-10-01 DIAGNOSIS — E78.5 DYSLIPIDEMIA: ICD-10-CM

## 2018-10-01 DIAGNOSIS — I10 HYPERTENSION, UNSPECIFIED TYPE: ICD-10-CM

## 2018-10-01 DIAGNOSIS — R60.0 LOCALIZED EDEMA: ICD-10-CM

## 2018-10-01 PROCEDURE — 93971 EXTREMITY STUDY: CPT | Performed by: INTERNAL MEDICINE

## 2018-10-01 PROCEDURE — 99213 OFFICE O/P EST LOW 20 MIN: CPT | Performed by: NURSE PRACTITIONER

## 2018-10-01 PROCEDURE — 93971 EXTREMITY STUDY: CPT

## 2018-10-01 RX ORDER — DOXYCYCLINE HYCLATE 100 MG
100 TABLET ORAL 2 TIMES DAILY
Qty: 20 TABLET | Refills: 0 | Status: ON HOLD | OUTPATIENT
Start: 2018-10-01 | End: 2018-10-15

## 2018-10-01 NOTE — PROGRESS NOTES
Iliana Oleary  1934  [phone]      10/01/2018    Sreekanth Shi, ALAN    Chief Complaint   Patient presents with   • Coronary Artery Disease       PROBLEM LIST:  1.  Coronary artery disease:  a. Cardiac catheterization: Dr. Warner,  01/06/2009.   i. PCI to proximal LAD (2.75 x 28 mm Promus CIRO).     b. Cardiac catheterization: Dr. Mejia, 08/03/2009: No in-stent restenosis or coronary artery obstruction; normal LV function.  c. Adenosine Cardiolite stress test, 04/30/2014:  Negative for ischemia and scar; LVEF (73%) without WMA.   d. Stress test with PET, 7/11/2016: EF 67%. No evidence of inducible ischemia by scintigraphic criteria.  e. Echocardiogram, 7/11/2016: EF 60%. Mild MR. Mild TR.  f. Echocardiogram, 9/19/2017: EF >70. Moderate concentric hypertrophy. Diastolic dysfunction. RV mildly dilated with reduced systolic function. Relative systolic sparing of the apex. RV:LV ratio is 1.0.   2. Hypertension.   3. DVT  a. Duplex Venous Lower Extremity, 9/19/17. Acute right lower extremity deep vein thrombosis noted in the common femoral, proximal femoral and peroneal. Acute right lower extremity superficial thrombosis noted in the saphenofemoral junction. All other veins appeared normal bilaterally.   4. Dyslipidemia.   5. PFO versus small ASD (echocardiogram on 05/06/2011) - asymptomatic.    6. Morbid obesity; BMI > 40.    7. Osteoarthritis.   8. Status post surgery, remote:  a. Hemorrhoidectomy, 1966.  b. Right  heel spur surgery, 1988.  c. Left ankle fracture stabilization, 1988.  d. Left hand trigger finger; Right hand trigger finger repair, 1996.    e. Cholecystectomy, May 2000.  f. Left ankle screw and bone chip removal, November 2001.  g. Anal fistulectomy,  October 2002.  h. Right flank abscess (staph), May 2005.  i. Left total knee replacement, October 2007.  j. Right knee replacement, August 2016    Allergies   Allergen Reactions   • Codeine Nausea And Vomiting   • Morphine And Related  Nausea And Vomiting       Current Medications:      Current Outpatient Prescriptions:   •  acetaminophen (TYLENOL) 325 MG tablet, Take 2 tablets by mouth Every 6 (Six) Hours As Needed for Mild Pain ., Disp: , Rfl:   •  apixaban (ELIQUIS) 5 MG tablet tablet, Take 5 mg by mouth 2 (Two) Times a Day., Disp: , Rfl:   •  aspirin 81 MG EC tablet, Take 81 mg by mouth every morning., Disp: , Rfl:   •  atorvastatin (LIPITOR) 40 MG tablet, Take 40 mg by mouth daily., Disp: , Rfl:   •  busPIRone (BUSPAR) 5 MG tablet, Take 1 tablet by mouth Every 12 (Twelve) Hours As Needed (Anxiety). (Patient taking differently: Take 5 mg by mouth Daily. Take 3 tablets daily; BID prn), Disp: , Rfl:   •  lansoprazole (PREVACID) 30 MG capsule, Take 30 mg by mouth daily., Disp: , Rfl:   •  metOLazone (ZAROXOLYN) 2.5 MG tablet, Take 1 tablet by mouth Daily., Disp: 30 tablet, Rfl: 11  •  Multiple Vitamins-Minerals (MULTIVITAMIN PO), Take 1 tablet by mouth daily., Disp: , Rfl:   •  O2 (OXYGEN), Inhale 3 L/min Daily. 24/7, Disp: , Rfl:   •  oxybutynin XL (DITROPAN-XL) 10 MG 24 hr tablet, TK 1 T PO HS, Disp: , Rfl: 2  •  propranolol (INDERAL) 20 MG tablet, Take 20 mg by mouth Daily., Disp: , Rfl:   •  spironolactone (ALDACTONE) 50 MG tablet, Take 1 tablet by mouth Daily., Disp: 30 tablet, Rfl: 11  •  traMADol (ULTRAM) 50 MG tablet, 50 mg Every 6 (Six) Hours As Needed. Takes 2 tablets q 6 hours, Disp: , Rfl: 0  •  doxycycline (VIBRAMYICN) 100 MG tablet, Take 1 tablet by mouth 2 (Two) Times a Day., Disp: 20 tablet, Rfl: 0    HPI    Iliana Oleary is a pleasant 84-year-old female who presents today for 6 month follow up of CAD, hypertension, and hyperlipidemia. Since last visit, patient has been doing well from the cardiac standpoint.  However, she continues to wear her oxygen majority of the time.  She states that when she does take it off her oxygen level maintains between 90 and 93% but she believes it off for a couple of hours because she's afraid  "that it might drop if she doesn't put it back on.  She states that there is been some intermittent dizziness with 2 falls; has been no evidence of syncope.  She feels that her legs just gave out due to being tired and weak.  She denies having any palpitations or fluttering sensations to her chest and she denies having any vision changes during these episodes.  She states that as soon as she gets up, she feels perfectly fine and carries on his usual.  She does have some lower extremity edema to her left leg.  She states that she had been on a car door couple of weeks ago and she received quite a large bruise according to her .  Now it is reddened and warm to touch.  Leg circumference does not seem to be too much different than her right leg.  We will obtain a venous duplex to rule out DVT.  We will also start her on doxycycline as it appears that there may be an infection component 2.  She has had minimal medication changes but her blood pressures and heart rates are running 10-15 points lower than what they were 6 months ago.  Upon checking orthostatics today in the office, there was a drop without complaints of syncope.  Orthostatics are as noted:  Sitting 124/72, 45; standing 92/59, 54.   No reports of dizziness.  She denies having any current chest pain, worsening shortness of breath, dyspnea on exertion, fatigue, palpitations, and syncope.     The following portions of the patient's history were reviewed and updated as appropriate: allergies, current medications and problem list.    Pertinent positives as listed in the HPI.  All other systems reviewed are negative.    Vitals:    10/01/18 1031   BP: 92/53   BP Location: Right arm   Patient Position: Sitting   Pulse: (!) 48   Weight: 81.6 kg (180 lb)   Height: 154.9 cm (61\")       Physical Exam:  GENERAL: well-developed, well-nourished; in no acute distress.   NECK:  Carotid upstrokes are 2+ and  symmetrical without bruits.   LUNGS: Clear to auscultation " bilaterally without wheezing, rhonchi, or rales noted.   CARDIOVASCULAR: The heart has a regular rate with a normal S1 and S2. There is no murmur, gallop, rub, or click appreciated. The PMI is nondisplaced.   ABDOMEN: Soft and nontender  NEUROLOGICAL: Nonfocal; Alert and oriented  PERIPHERAL VASCULAR:  Posterior tibial pulses are 2+ and symmetrical. There is circumferential redness around her left lower extremity between her calf muscle and ankle.  It is slightly swollen and warm to touch.   SKIN:  Warm and dry  PSYCHIATRIC: normal mood and affect; behavior appropriate    Diagnostic Data:    Procedures    Assessment:      ICD-10-CM ICD-9-CM   1. Dizziness R42 780.4   2. Localized edema R60.0 782.3   3. Bilateral lower extremity edema R60.0 782.3   4. Dyslipidemia E78.5 272.4   5. Shortness of breath R06.02 786.05   6. Hypertension, unspecified type I10 401.9       Plan:  1. Doxycycline 100mg BID  2. Venous duplex LLE for DVT  3. holter 48 hours  4. Consider stopping Propranolol (does have mild essential tremor, though)  5. Continue current medications.  6. F/up in 3 months or sooner if needed.    Seen independently by ALAN Castro on 10/1/2018  1:35 PM

## 2018-10-02 LAB
BH CV LOWER VASCULAR LEFT COMMON FEMORAL AUGMENT: NORMAL
BH CV LOWER VASCULAR LEFT COMMON FEMORAL COMPRESS: NORMAL
BH CV LOWER VASCULAR LEFT COMMON FEMORAL PHASIC: NORMAL
BH CV LOWER VASCULAR LEFT COMMON FEMORAL SPONT: NORMAL
BH CV LOWER VASCULAR LEFT DISTAL FEMORAL AUGMENT: NORMAL
BH CV LOWER VASCULAR LEFT DISTAL FEMORAL COMPRESS: NORMAL
BH CV LOWER VASCULAR LEFT GASTRONEMIUS COMPRESS: NORMAL
BH CV LOWER VASCULAR LEFT GREATER SAPH AK COMPRESS: NORMAL
BH CV LOWER VASCULAR LEFT GREATER SAPH BK COMPRESS: NORMAL
BH CV LOWER VASCULAR LEFT LESSER SAPH COMPRESS: NORMAL
BH CV LOWER VASCULAR LEFT MID FEMORAL AUGMENT: NORMAL
BH CV LOWER VASCULAR LEFT MID FEMORAL COMPRESS: NORMAL
BH CV LOWER VASCULAR LEFT MID FEMORAL PHASIC: NORMAL
BH CV LOWER VASCULAR LEFT MID FEMORAL SPONT: NORMAL
BH CV LOWER VASCULAR LEFT PERONEAL COMPRESS: NORMAL
BH CV LOWER VASCULAR LEFT POPLITEAL AUGMENT: NORMAL
BH CV LOWER VASCULAR LEFT POPLITEAL COMPRESS: NORMAL
BH CV LOWER VASCULAR LEFT POPLITEAL PHASIC: NORMAL
BH CV LOWER VASCULAR LEFT POPLITEAL SPONT: NORMAL
BH CV LOWER VASCULAR LEFT POSTERIOR TIBIAL COMPRESS: NORMAL
BH CV LOWER VASCULAR LEFT PROFUNDA FEMORAL AUGMENT: NORMAL
BH CV LOWER VASCULAR LEFT PROFUNDA FEMORAL COMPRESS: NORMAL
BH CV LOWER VASCULAR LEFT PROFUNDA FEMORAL PHASIC: NORMAL
BH CV LOWER VASCULAR LEFT PROFUNDA FEMORAL SPONT: NORMAL
BH CV LOWER VASCULAR LEFT PROXIMAL FEMORAL AUGMENT: NORMAL
BH CV LOWER VASCULAR LEFT PROXIMAL FEMORAL COMPRESS: NORMAL
BH CV LOWER VASCULAR LEFT SAPHENOFEMORAL JUNCTION AUGMENT: NORMAL
BH CV LOWER VASCULAR LEFT SAPHENOFEMORAL JUNCTION COMPRESS: NORMAL
BH CV LOWER VASCULAR LEFT SAPHENOFEMORAL JUNCTION PHASIC: NORMAL
BH CV LOWER VASCULAR LEFT SAPHENOFEMORAL JUNCTION SPONT: NORMAL
BH CV LOWER VASCULAR RIGHT COMMON FEMORAL AUGMENT: NORMAL
BH CV LOWER VASCULAR RIGHT COMMON FEMORAL COMPRESS: NORMAL
BH CV LOWER VASCULAR RIGHT COMMON FEMORAL PHASIC: NORMAL
BH CV LOWER VASCULAR RIGHT COMMON FEMORAL SPONT: NORMAL

## 2018-10-05 ENCOUNTER — TELEPHONE (OUTPATIENT)
Dept: CARDIOLOGY | Facility: CLINIC | Age: 83
End: 2018-10-05

## 2018-10-05 DIAGNOSIS — R94.31 ABNORMAL HOLTER MONITOR FINDING: ICD-10-CM

## 2018-10-05 DIAGNOSIS — R00.1 BRADYCARDIA: Primary | ICD-10-CM

## 2018-10-05 DIAGNOSIS — I44.0 FIRST DEGREE AV BLOCK: ICD-10-CM

## 2018-10-08 PROBLEM — I44.0 FIRST DEGREE AV BLOCK: Status: ACTIVE | Noted: 2018-10-08

## 2018-10-08 PROBLEM — R94.31 ABNORMAL HOLTER MONITOR FINDING: Status: ACTIVE | Noted: 2018-10-08

## 2018-10-08 PROBLEM — R00.1 BRADYCARDIA: Status: ACTIVE | Noted: 2018-10-08

## 2018-10-12 ENCOUNTER — PREP FOR SURGERY (OUTPATIENT)
Dept: OTHER | Facility: HOSPITAL | Age: 83
End: 2018-10-12

## 2018-10-12 DIAGNOSIS — I45.5 SINUS PAUSE: Primary | ICD-10-CM

## 2018-10-12 RX ORDER — CEFAZOLIN SODIUM 2 G/100ML
2 INJECTION, SOLUTION INTRAVENOUS
Status: CANCELLED | OUTPATIENT
Start: 2018-10-15

## 2018-10-12 RX ORDER — SODIUM CHLORIDE 0.9 % (FLUSH) 0.9 %
3 SYRINGE (ML) INJECTION EVERY 12 HOURS SCHEDULED
Status: CANCELLED | OUTPATIENT
Start: 2018-10-12

## 2018-10-12 RX ORDER — SODIUM CHLORIDE 0.9 % (FLUSH) 0.9 %
3-10 SYRINGE (ML) INJECTION AS NEEDED
Status: CANCELLED | OUTPATIENT
Start: 2018-10-12

## 2018-10-14 ENCOUNTER — APPOINTMENT (OUTPATIENT)
Dept: PREADMISSION TESTING | Facility: HOSPITAL | Age: 83
End: 2018-10-14

## 2018-10-14 DIAGNOSIS — I45.5 SINUS PAUSE: ICD-10-CM

## 2018-10-14 LAB
ALBUMIN SERPL-MCNC: 3.66 G/DL (ref 3.2–4.8)
ALBUMIN/GLOB SERPL: 1.6 G/DL (ref 1.5–2.5)
ALP SERPL-CCNC: 93 U/L (ref 25–100)
ALT SERPL W P-5'-P-CCNC: 11 U/L (ref 7–40)
ANION GAP SERPL CALCULATED.3IONS-SCNC: 5 MMOL/L (ref 3–11)
AST SERPL-CCNC: 26 U/L (ref 0–33)
BILIRUB SERPL-MCNC: 0.3 MG/DL (ref 0.3–1.2)
BUN BLD-MCNC: 37 MG/DL (ref 9–23)
BUN/CREAT SERPL: 27 (ref 7–25)
CALCIUM SPEC-SCNC: 9 MG/DL (ref 8.7–10.4)
CHLORIDE SERPL-SCNC: 108 MMOL/L (ref 99–109)
CO2 SERPL-SCNC: 28 MMOL/L (ref 20–31)
CREAT BLD-MCNC: 1.37 MG/DL (ref 0.6–1.3)
DEPRECATED RDW RBC AUTO: 46.3 FL (ref 37–54)
ERYTHROCYTE [DISTWIDTH] IN BLOOD BY AUTOMATED COUNT: 14.3 % (ref 11.3–14.5)
GFR SERPL CREATININE-BSD FRML MDRD: 37 ML/MIN/1.73
GLOBULIN UR ELPH-MCNC: 2.2 GM/DL
GLUCOSE BLD-MCNC: 90 MG/DL (ref 70–100)
HCT VFR BLD AUTO: 34.6 % (ref 34.5–44)
HGB BLD-MCNC: 10.5 G/DL (ref 11.5–15.5)
MCH RBC QN AUTO: 26.9 PG (ref 27–31)
MCHC RBC AUTO-ENTMCNC: 30.3 G/DL (ref 32–36)
MCV RBC AUTO: 88.7 FL (ref 80–99)
PLATELET # BLD AUTO: 335 10*3/MM3 (ref 150–450)
PMV BLD AUTO: 10.2 FL (ref 6–12)
POTASSIUM BLD-SCNC: 5 MMOL/L (ref 3.5–5.5)
PROT SERPL-MCNC: 5.9 G/DL (ref 5.7–8.2)
RBC # BLD AUTO: 3.9 10*6/MM3 (ref 3.89–5.14)
SODIUM BLD-SCNC: 141 MMOL/L (ref 132–146)
WBC NRBC COR # BLD: 11.21 10*3/MM3 (ref 3.5–10.8)

## 2018-10-14 PROCEDURE — 36415 COLL VENOUS BLD VENIPUNCTURE: CPT

## 2018-10-14 PROCEDURE — 85027 COMPLETE CBC AUTOMATED: CPT | Performed by: NURSE PRACTITIONER

## 2018-10-14 PROCEDURE — 80053 COMPREHEN METABOLIC PANEL: CPT | Performed by: NURSE PRACTITIONER

## 2018-10-14 RX ORDER — GABAPENTIN 300 MG/1
300 CAPSULE ORAL 3 TIMES DAILY
COMMUNITY
End: 2021-04-05 | Stop reason: DRUGHIGH

## 2018-10-14 RX ORDER — ESCITALOPRAM OXALATE 20 MG/1
20 TABLET ORAL 2 TIMES DAILY
COMMUNITY
End: 2021-07-19

## 2018-10-14 RX ORDER — OXYCODONE AND ACETAMINOPHEN TABLETS 5; 300 MG/1; MG/1
1 TABLET ORAL EVERY 8 HOURS PRN
Status: ON HOLD | COMMUNITY
End: 2018-10-15

## 2018-10-14 RX ORDER — OXYBUTYNIN CHLORIDE 10 MG/1
10 TABLET, EXTENDED RELEASE ORAL DAILY
Status: ON HOLD | COMMUNITY
End: 2018-10-15

## 2018-10-14 NOTE — PAT
Patient to apply Chlorhexadine wipes  to surgical area (as instructed) the night before procedure and the AM of procedure. Wipes provided.    INSTRUCTED PT TO BRING MEDICATION BOTTLES DAY OF PROCEDURE. PT STATES THEY ARE NOT BRINGING MEDICATION BOTTLES.     PT STATES WAS ON ANTIBIOTIC PRESCRIBED BY DR FRYE FOR INJURY TO LEFT LEG TO PREVENT INFECTION.     PT HAS HISTORY OF STAPH INFECTION (R FLANK) AND SOME TYPE OF HEPATITIS INFECTION. CAN NOT REMEMBER WHAT TYPE.

## 2018-10-14 NOTE — DISCHARGE INSTRUCTIONS

## 2018-10-15 ENCOUNTER — HOSPITAL ENCOUNTER (OUTPATIENT)
Facility: HOSPITAL | Age: 83
Discharge: HOME | End: 2018-10-16
Attending: INTERNAL MEDICINE | Admitting: INTERNAL MEDICINE

## 2018-10-15 DIAGNOSIS — I44.0 FIRST DEGREE AV BLOCK: ICD-10-CM

## 2018-10-15 DIAGNOSIS — I45.5 SINUS PAUSE: ICD-10-CM

## 2018-10-15 DIAGNOSIS — R94.31 ABNORMAL HOLTER MONITOR FINDING: ICD-10-CM

## 2018-10-15 DIAGNOSIS — R00.1 BRADYCARDIA: ICD-10-CM

## 2018-10-15 LAB
ANION GAP SERPL CALCULATED.3IONS-SCNC: 6 MMOL/L (ref 3–11)
BUN BLD-MCNC: 31 MG/DL (ref 9–23)
BUN/CREAT SERPL: 24.2 (ref 7–25)
CALCIUM SPEC-SCNC: 8.7 MG/DL (ref 8.7–10.4)
CHLORIDE SERPL-SCNC: 106 MMOL/L (ref 99–109)
CO2 SERPL-SCNC: 26 MMOL/L (ref 20–31)
CREAT BLD-MCNC: 1.28 MG/DL (ref 0.6–1.3)
GFR SERPL CREATININE-BSD FRML MDRD: 40 ML/MIN/1.73
GLUCOSE BLD-MCNC: 90 MG/DL (ref 70–100)
POTASSIUM BLD-SCNC: 3.8 MMOL/L (ref 3.5–5.5)
SODIUM BLD-SCNC: 138 MMOL/L (ref 132–146)

## 2018-10-15 PROCEDURE — 25010000002 MIDAZOLAM PER 1 MG: Performed by: INTERNAL MEDICINE

## 2018-10-15 PROCEDURE — C1898 LEAD, PMKR, OTHER THAN TRANS: HCPCS | Performed by: INTERNAL MEDICINE

## 2018-10-15 PROCEDURE — A9270 NON-COVERED ITEM OR SERVICE: HCPCS | Performed by: INTERNAL MEDICINE

## 2018-10-15 PROCEDURE — 63710000001 ATORVASTATIN 40 MG TABLET: Performed by: INTERNAL MEDICINE

## 2018-10-15 PROCEDURE — 99153 MOD SED SAME PHYS/QHP EA: CPT | Performed by: INTERNAL MEDICINE

## 2018-10-15 PROCEDURE — 33208 INSRT HEART PM ATRIAL & VENT: CPT | Performed by: INTERNAL MEDICINE

## 2018-10-15 PROCEDURE — G0378 HOSPITAL OBSERVATION PER HR: HCPCS

## 2018-10-15 PROCEDURE — 63710000001 GABAPENTIN 300 MG CAPSULE: Performed by: INTERNAL MEDICINE

## 2018-10-15 PROCEDURE — C1892 INTRO/SHEATH,FIXED,PEEL-AWAY: HCPCS | Performed by: INTERNAL MEDICINE

## 2018-10-15 PROCEDURE — 63710000001 ESCITALOPRAM 20 MG TABLET: Performed by: INTERNAL MEDICINE

## 2018-10-15 PROCEDURE — 63710000001 HYDROCODONE-ACETAMINOPHEN 7.5-325 MG TABLET: Performed by: INTERNAL MEDICINE

## 2018-10-15 PROCEDURE — C1785 PMKR, DUAL, RATE-RESP: HCPCS | Performed by: INTERNAL MEDICINE

## 2018-10-15 PROCEDURE — 80048 BASIC METABOLIC PNL TOTAL CA: CPT | Performed by: INTERNAL MEDICINE

## 2018-10-15 PROCEDURE — 25010000002 FENTANYL CITRATE (PF) 100 MCG/2ML SOLUTION: Performed by: INTERNAL MEDICINE

## 2018-10-15 PROCEDURE — 25010000003 CEFAZOLIN IN DEXTROSE 2-4 GM/100ML-% SOLUTION: Performed by: NURSE PRACTITIONER

## 2018-10-15 PROCEDURE — 63710000001 SPIRONOLACTONE 50 MG TABLET: Performed by: INTERNAL MEDICINE

## 2018-10-15 PROCEDURE — 99024 POSTOP FOLLOW-UP VISIT: CPT | Performed by: INTERNAL MEDICINE

## 2018-10-15 PROCEDURE — 99152 MOD SED SAME PHYS/QHP 5/>YRS: CPT | Performed by: INTERNAL MEDICINE

## 2018-10-15 PROCEDURE — 63710000001 PROPRANOLOL 20 MG TABLET: Performed by: INTERNAL MEDICINE

## 2018-10-15 PROCEDURE — 63710000001 OXYBUTYNIN XL 10 MG TABLET SUSTAINED-RELEASE 24 HOUR: Performed by: INTERNAL MEDICINE

## 2018-10-15 PROCEDURE — 63710000001 METOLAZONE 2.5 MG TABLET: Performed by: INTERNAL MEDICINE

## 2018-10-15 PROCEDURE — 63710000001 BUSPIRONE 15 MG TABLET: Performed by: INTERNAL MEDICINE

## 2018-10-15 PROCEDURE — 25010000002 CEFAZOLIN PER 500 MG: Performed by: INTERNAL MEDICINE

## 2018-10-15 DEVICE — IMPLANTABLE DEVICE: Type: IMPLANTABLE DEVICE | Status: FUNCTIONAL

## 2018-10-15 DEVICE — PACEMAKER
Type: IMPLANTABLE DEVICE | Status: FUNCTIONAL
Brand: ACCOLADE™ MRI DR

## 2018-10-15 DEVICE — ENDOCARDIAL STEROID-ELUTING MR CONDITIONAL STYLET DELIVERED PACE/SENSE LEAD
Type: IMPLANTABLE DEVICE | Status: FUNCTIONAL
Brand: INGEVITY™ MRI

## 2018-10-15 RX ORDER — SODIUM CHLORIDE 0.9 % (FLUSH) 0.9 %
3 SYRINGE (ML) INJECTION EVERY 12 HOURS SCHEDULED
Status: DISCONTINUED | OUTPATIENT
Start: 2018-10-15 | End: 2018-10-15 | Stop reason: HOSPADM

## 2018-10-15 RX ORDER — TRAMADOL HYDROCHLORIDE 50 MG/1
50 TABLET ORAL EVERY 4 HOURS PRN
Status: DISCONTINUED | OUTPATIENT
Start: 2018-10-15 | End: 2018-10-16 | Stop reason: HOSPADM

## 2018-10-15 RX ORDER — SODIUM CHLORIDE 0.9 % (FLUSH) 0.9 %
1-10 SYRINGE (ML) INJECTION AS NEEDED
Status: DISCONTINUED | OUTPATIENT
Start: 2018-10-15 | End: 2018-10-16 | Stop reason: HOSPADM

## 2018-10-15 RX ORDER — FENTANYL CITRATE 50 UG/ML
INJECTION, SOLUTION INTRAMUSCULAR; INTRAVENOUS AS NEEDED
Status: DISCONTINUED | OUTPATIENT
Start: 2018-10-15 | End: 2018-10-15 | Stop reason: HOSPADM

## 2018-10-15 RX ORDER — PANTOPRAZOLE SODIUM 40 MG/1
40 TABLET, DELAYED RELEASE ORAL EVERY MORNING
Status: DISCONTINUED | OUTPATIENT
Start: 2018-10-16 | End: 2018-10-16 | Stop reason: HOSPADM

## 2018-10-15 RX ORDER — SODIUM CHLORIDE 0.9 % (FLUSH) 0.9 %
3-10 SYRINGE (ML) INJECTION AS NEEDED
Status: DISCONTINUED | OUTPATIENT
Start: 2018-10-15 | End: 2018-10-15 | Stop reason: HOSPADM

## 2018-10-15 RX ORDER — ATORVASTATIN CALCIUM 40 MG/1
40 TABLET, FILM COATED ORAL NIGHTLY
Status: DISCONTINUED | OUTPATIENT
Start: 2018-10-15 | End: 2018-10-16 | Stop reason: HOSPADM

## 2018-10-15 RX ORDER — BUSPIRONE HYDROCHLORIDE 15 MG/1
15 TABLET ORAL 2 TIMES DAILY
COMMUNITY

## 2018-10-15 RX ORDER — GABAPENTIN 300 MG/1
300 CAPSULE ORAL 2 TIMES DAILY
Status: DISCONTINUED | OUTPATIENT
Start: 2018-10-15 | End: 2018-10-16 | Stop reason: HOSPADM

## 2018-10-15 RX ORDER — BUSPIRONE HYDROCHLORIDE 15 MG/1
15 TABLET ORAL EVERY 12 HOURS SCHEDULED
Status: DISCONTINUED | OUTPATIENT
Start: 2018-10-15 | End: 2018-10-16 | Stop reason: HOSPADM

## 2018-10-15 RX ORDER — ZOLPIDEM TARTRATE 5 MG/1
5 TABLET ORAL NIGHTLY PRN
Status: DISCONTINUED | OUTPATIENT
Start: 2018-10-15 | End: 2018-10-16 | Stop reason: HOSPADM

## 2018-10-15 RX ORDER — ACETAMINOPHEN 325 MG/1
650 TABLET ORAL EVERY 6 HOURS PRN
Status: DISCONTINUED | OUTPATIENT
Start: 2018-10-15 | End: 2018-10-16 | Stop reason: HOSPADM

## 2018-10-15 RX ORDER — ESCITALOPRAM OXALATE 20 MG/1
20 TABLET ORAL EVERY MORNING
Status: DISCONTINUED | OUTPATIENT
Start: 2018-10-15 | End: 2018-10-16 | Stop reason: HOSPADM

## 2018-10-15 RX ORDER — MIDAZOLAM HYDROCHLORIDE 1 MG/ML
INJECTION INTRAMUSCULAR; INTRAVENOUS AS NEEDED
Status: DISCONTINUED | OUTPATIENT
Start: 2018-10-15 | End: 2018-10-15 | Stop reason: HOSPADM

## 2018-10-15 RX ORDER — CEFAZOLIN SODIUM 2 G/100ML
2 INJECTION, SOLUTION INTRAVENOUS
Status: COMPLETED | OUTPATIENT
Start: 2018-10-15 | End: 2018-10-15

## 2018-10-15 RX ORDER — OXYBUTYNIN CHLORIDE 10 MG/1
10 TABLET, EXTENDED RELEASE ORAL DAILY
Status: DISCONTINUED | OUTPATIENT
Start: 2018-10-15 | End: 2018-10-16 | Stop reason: HOSPADM

## 2018-10-15 RX ORDER — PROPRANOLOL HYDROCHLORIDE 20 MG/1
20 TABLET ORAL DAILY
Status: DISCONTINUED | OUTPATIENT
Start: 2018-10-15 | End: 2018-10-16 | Stop reason: HOSPADM

## 2018-10-15 RX ORDER — METOLAZONE 2.5 MG/1
2.5 TABLET ORAL DAILY
Status: DISCONTINUED | OUTPATIENT
Start: 2018-10-15 | End: 2018-10-16 | Stop reason: HOSPADM

## 2018-10-15 RX ORDER — ONDANSETRON 2 MG/ML
4 INJECTION INTRAMUSCULAR; INTRAVENOUS EVERY 6 HOURS PRN
Status: DISCONTINUED | OUTPATIENT
Start: 2018-10-15 | End: 2018-10-16 | Stop reason: HOSPADM

## 2018-10-15 RX ORDER — IBUPROFEN 200 MG
200 TABLET ORAL EVERY 6 HOURS PRN
Status: DISCONTINUED | OUTPATIENT
Start: 2018-10-15 | End: 2018-10-16 | Stop reason: HOSPADM

## 2018-10-15 RX ORDER — HYDROCODONE BITARTRATE AND ACETAMINOPHEN 7.5; 325 MG/1; MG/1
1 TABLET ORAL EVERY 4 HOURS PRN
Status: DISCONTINUED | OUTPATIENT
Start: 2018-10-15 | End: 2018-10-16 | Stop reason: HOSPADM

## 2018-10-15 RX ORDER — ASPIRIN 81 MG/1
81 TABLET ORAL EVERY MORNING
COMMUNITY

## 2018-10-15 RX ORDER — SPIRONOLACTONE 50 MG/1
50 TABLET, FILM COATED ORAL DAILY
Status: DISCONTINUED | OUTPATIENT
Start: 2018-10-15 | End: 2018-10-16 | Stop reason: HOSPADM

## 2018-10-15 RX ORDER — ASPIRIN 81 MG/1
81 TABLET ORAL EVERY MORNING
Status: DISCONTINUED | OUTPATIENT
Start: 2018-10-16 | End: 2018-10-16 | Stop reason: HOSPADM

## 2018-10-15 RX ORDER — LIDOCAINE HYDROCHLORIDE 10 MG/ML
INJECTION, SOLUTION EPIDURAL; INFILTRATION; INTRACAUDAL; PERINEURAL AS NEEDED
Status: DISCONTINUED | OUTPATIENT
Start: 2018-10-15 | End: 2018-10-15 | Stop reason: HOSPADM

## 2018-10-15 RX ORDER — CEFAZOLIN SODIUM 2 G/100ML
2 INJECTION, SOLUTION INTRAVENOUS EVERY 8 HOURS
Status: COMPLETED | OUTPATIENT
Start: 2018-10-15 | End: 2018-10-16

## 2018-10-15 RX ORDER — SODIUM CHLORIDE 0.9 % (FLUSH) 0.9 %
3 SYRINGE (ML) INJECTION EVERY 12 HOURS SCHEDULED
Status: DISCONTINUED | OUTPATIENT
Start: 2018-10-15 | End: 2018-10-16 | Stop reason: HOSPADM

## 2018-10-15 RX ADMIN — CEFAZOLIN SODIUM 2 G: 10 INJECTION, POWDER, FOR SOLUTION INTRAVENOUS at 16:20

## 2018-10-15 RX ADMIN — METOLAZONE 2.5 MG: 2.5 TABLET ORAL at 16:21

## 2018-10-15 RX ADMIN — ESCITALOPRAM OXALATE 20 MG: 20 TABLET ORAL at 16:21

## 2018-10-15 RX ADMIN — ATORVASTATIN CALCIUM 40 MG: 40 TABLET, FILM COATED ORAL at 20:24

## 2018-10-15 RX ADMIN — PROPRANOLOL HYDROCHLORIDE 20 MG: 20 TABLET ORAL at 16:21

## 2018-10-15 RX ADMIN — GABAPENTIN 300 MG: 300 CAPSULE ORAL at 20:24

## 2018-10-15 RX ADMIN — CEFAZOLIN SODIUM 2 G: 10 INJECTION, POWDER, FOR SOLUTION INTRAVENOUS at 23:31

## 2018-10-15 RX ADMIN — HYDROCODONE BITARTRATE AND ACETAMINOPHEN 1 TABLET: 7.5; 325 TABLET ORAL at 16:37

## 2018-10-15 RX ADMIN — HYDROCODONE BITARTRATE AND ACETAMINOPHEN 1 TABLET: 7.5; 325 TABLET ORAL at 23:29

## 2018-10-15 RX ADMIN — SPIRONOLACTONE 50 MG: 50 TABLET ORAL at 16:21

## 2018-10-15 RX ADMIN — OXYBUTYNIN CHLORIDE 10 MG: 10 TABLET, EXTENDED RELEASE ORAL at 16:21

## 2018-10-15 RX ADMIN — BUSPIRONE HYDROCHLORIDE 15 MG: 15 TABLET ORAL at 20:24

## 2018-10-15 RX ADMIN — CEFAZOLIN SODIUM 2 G: 2 INJECTION, SOLUTION INTRAVENOUS at 08:10

## 2018-10-15 NOTE — PROGRESS NOTES
The patient has had worsening lower extremity edema which has improved on diuretic therapy.  She has also undergone a course of doxycycline which has improved her legs somewhat but she persists in having some redness on the left.    That reason the patient will be discharged on 7 days of doxycycline and her diuretics for her lower extremity edema will be reevaluated.    Tracy Diego M.D. [facility]

## 2018-10-15 NOTE — INTERVAL H&P NOTE
H&P updated. The patient was examined and the following changes are noted:  Holter monitor showed multiple episodes of bradycardia and several pauses the longest of which was 3.5 seconds.  The patient now presents today for permanent pacemaker placement due to irreversible sinus node dysfunction.    A left sided implant will be performed.    Tracy Diego M.D. [facility]

## 2018-10-15 NOTE — H&P (VIEW-ONLY)
Iliana Oleary  1934  [phone]      10/01/2018    Sreekanth Shi, ALAN    Chief Complaint   Patient presents with   • Coronary Artery Disease       PROBLEM LIST:  1.  Coronary artery disease:  a. Cardiac catheterization: Dr. Warner,  01/06/2009.   i. PCI to proximal LAD (2.75 x 28 mm Promus CIRO).     b. Cardiac catheterization: Dr. Mejia, 08/03/2009: No in-stent restenosis or coronary artery obstruction; normal LV function.  c. Adenosine Cardiolite stress test, 04/30/2014:  Negative for ischemia and scar; LVEF (73%) without WMA.   d. Stress test with PET, 7/11/2016: EF 67%. No evidence of inducible ischemia by scintigraphic criteria.  e. Echocardiogram, 7/11/2016: EF 60%. Mild MR. Mild TR.  f. Echocardiogram, 9/19/2017: EF >70. Moderate concentric hypertrophy. Diastolic dysfunction. RV mildly dilated with reduced systolic function. Relative systolic sparing of the apex. RV:LV ratio is 1.0.   2. Hypertension.   3. DVT  a. Duplex Venous Lower Extremity, 9/19/17. Acute right lower extremity deep vein thrombosis noted in the common femoral, proximal femoral and peroneal. Acute right lower extremity superficial thrombosis noted in the saphenofemoral junction. All other veins appeared normal bilaterally.   4. Dyslipidemia.   5. PFO versus small ASD (echocardiogram on 05/06/2011) - asymptomatic.    6. Morbid obesity; BMI > 40.    7. Osteoarthritis.   8. Status post surgery, remote:  a. Hemorrhoidectomy, 1966.  b. Right  heel spur surgery, 1988.  c. Left ankle fracture stabilization, 1988.  d. Left hand trigger finger; Right hand trigger finger repair, 1996.    e. Cholecystectomy, May 2000.  f. Left ankle screw and bone chip removal, November 2001.  g. Anal fistulectomy,  October 2002.  h. Right flank abscess (staph), May 2005.  i. Left total knee replacement, October 2007.  j. Right knee replacement, August 2016    Allergies   Allergen Reactions   • Codeine Nausea And Vomiting   • Morphine And Related  Nausea And Vomiting       Current Medications:      Current Outpatient Prescriptions:   •  acetaminophen (TYLENOL) 325 MG tablet, Take 2 tablets by mouth Every 6 (Six) Hours As Needed for Mild Pain ., Disp: , Rfl:   •  apixaban (ELIQUIS) 5 MG tablet tablet, Take 5 mg by mouth 2 (Two) Times a Day., Disp: , Rfl:   •  aspirin 81 MG EC tablet, Take 81 mg by mouth every morning., Disp: , Rfl:   •  atorvastatin (LIPITOR) 40 MG tablet, Take 40 mg by mouth daily., Disp: , Rfl:   •  busPIRone (BUSPAR) 5 MG tablet, Take 1 tablet by mouth Every 12 (Twelve) Hours As Needed (Anxiety). (Patient taking differently: Take 5 mg by mouth Daily. Take 3 tablets daily; BID prn), Disp: , Rfl:   •  lansoprazole (PREVACID) 30 MG capsule, Take 30 mg by mouth daily., Disp: , Rfl:   •  metOLazone (ZAROXOLYN) 2.5 MG tablet, Take 1 tablet by mouth Daily., Disp: 30 tablet, Rfl: 11  •  Multiple Vitamins-Minerals (MULTIVITAMIN PO), Take 1 tablet by mouth daily., Disp: , Rfl:   •  O2 (OXYGEN), Inhale 3 L/min Daily. 24/7, Disp: , Rfl:   •  oxybutynin XL (DITROPAN-XL) 10 MG 24 hr tablet, TK 1 T PO HS, Disp: , Rfl: 2  •  propranolol (INDERAL) 20 MG tablet, Take 20 mg by mouth Daily., Disp: , Rfl:   •  spironolactone (ALDACTONE) 50 MG tablet, Take 1 tablet by mouth Daily., Disp: 30 tablet, Rfl: 11  •  traMADol (ULTRAM) 50 MG tablet, 50 mg Every 6 (Six) Hours As Needed. Takes 2 tablets q 6 hours, Disp: , Rfl: 0  •  doxycycline (VIBRAMYICN) 100 MG tablet, Take 1 tablet by mouth 2 (Two) Times a Day., Disp: 20 tablet, Rfl: 0    HPI    Iliana Oleary is a pleasant 84-year-old female who presents today for 6 month follow up of CAD, hypertension, and hyperlipidemia. Since last visit, patient has been doing well from the cardiac standpoint.  However, she continues to wear her oxygen majority of the time.  She states that when she does take it off her oxygen level maintains between 90 and 93% but she believes it off for a couple of hours because she's afraid  "that it might drop if she doesn't put it back on.  She states that there is been some intermittent dizziness with 2 falls; has been no evidence of syncope.  She feels that her legs just gave out due to being tired and weak.  She denies having any palpitations or fluttering sensations to her chest and she denies having any vision changes during these episodes.  She states that as soon as she gets up, she feels perfectly fine and carries on his usual.  She does have some lower extremity edema to her left leg.  She states that she had been on a car door couple of weeks ago and she received quite a large bruise according to her .  Now it is reddened and warm to touch.  Leg circumference does not seem to be too much different than her right leg.  We will obtain a venous duplex to rule out DVT.  We will also start her on doxycycline as it appears that there may be an infection component 2.  She has had minimal medication changes but her blood pressures and heart rates are running 10-15 points lower than what they were 6 months ago.  Upon checking orthostatics today in the office, there was a drop without complaints of syncope.  Orthostatics are as noted:  Sitting 124/72, 45; standing 92/59, 54.   No reports of dizziness.  She denies having any current chest pain, worsening shortness of breath, dyspnea on exertion, fatigue, palpitations, and syncope.     The following portions of the patient's history were reviewed and updated as appropriate: allergies, current medications and problem list.    Pertinent positives as listed in the HPI.  All other systems reviewed are negative.    Vitals:    10/01/18 1031   BP: 92/53   BP Location: Right arm   Patient Position: Sitting   Pulse: (!) 48   Weight: 81.6 kg (180 lb)   Height: 154.9 cm (61\")       Physical Exam:  GENERAL: well-developed, well-nourished; in no acute distress.   NECK:  Carotid upstrokes are 2+ and  symmetrical without bruits.   LUNGS: Clear to auscultation " bilaterally without wheezing, rhonchi, or rales noted.   CARDIOVASCULAR: The heart has a regular rate with a normal S1 and S2. There is no murmur, gallop, rub, or click appreciated. The PMI is nondisplaced.   ABDOMEN: Soft and nontender  NEUROLOGICAL: Nonfocal; Alert and oriented  PERIPHERAL VASCULAR:  Posterior tibial pulses are 2+ and symmetrical. There is circumferential redness around her left lower extremity between her calf muscle and ankle.  It is slightly swollen and warm to touch.   SKIN:  Warm and dry  PSYCHIATRIC: normal mood and affect; behavior appropriate    Diagnostic Data:    Procedures    Assessment:      ICD-10-CM ICD-9-CM   1. Dizziness R42 780.4   2. Localized edema R60.0 782.3   3. Bilateral lower extremity edema R60.0 782.3   4. Dyslipidemia E78.5 272.4   5. Shortness of breath R06.02 786.05   6. Hypertension, unspecified type I10 401.9       Plan:  1. Doxycycline 100mg BID  2. Venous duplex LLE for DVT  3. holter 48 hours  4. Consider stopping Propranolol (does have mild essential tremor, though)  5. Continue current medications.  6. F/up in 3 months or sooner if needed.    Seen independently by ALAN Castro on 10/1/2018  1:35 PM

## 2018-10-16 ENCOUNTER — APPOINTMENT (OUTPATIENT)
Dept: GENERAL RADIOLOGY | Facility: HOSPITAL | Age: 83
End: 2018-10-16

## 2018-10-16 VITALS
OXYGEN SATURATION: 98 % | SYSTOLIC BLOOD PRESSURE: 151 MMHG | HEART RATE: 60 BPM | HEIGHT: 60 IN | DIASTOLIC BLOOD PRESSURE: 65 MMHG | WEIGHT: 179.01 LBS | TEMPERATURE: 97.5 F | RESPIRATION RATE: 16 BRPM | BODY MASS INDEX: 35.15 KG/M2

## 2018-10-16 LAB
GLUCOSE BLDC GLUCOMTR-MCNC: 104 MG/DL (ref 70–130)
INR PPP: 0.98 (ref 0.91–1.09)
PROTHROMBIN TIME: 10.3 SECONDS (ref 9.6–11.5)

## 2018-10-16 PROCEDURE — 63710000001 SPIRONOLACTONE 50 MG TABLET: Performed by: INTERNAL MEDICINE

## 2018-10-16 PROCEDURE — 99213 OFFICE O/P EST LOW 20 MIN: CPT | Performed by: INTERNAL MEDICINE

## 2018-10-16 PROCEDURE — 63710000001 METOLAZONE 2.5 MG TABLET: Performed by: INTERNAL MEDICINE

## 2018-10-16 PROCEDURE — 82962 GLUCOSE BLOOD TEST: CPT

## 2018-10-16 PROCEDURE — A9270 NON-COVERED ITEM OR SERVICE: HCPCS | Performed by: INTERNAL MEDICINE

## 2018-10-16 PROCEDURE — 63710000001 ASPIRIN 81 MG TABLET DELAYED-RELEASE: Performed by: INTERNAL MEDICINE

## 2018-10-16 PROCEDURE — 93010 ELECTROCARDIOGRAM REPORT: CPT | Performed by: INTERNAL MEDICINE

## 2018-10-16 PROCEDURE — G0378 HOSPITAL OBSERVATION PER HR: HCPCS

## 2018-10-16 PROCEDURE — 93005 ELECTROCARDIOGRAM TRACING: CPT | Performed by: INTERNAL MEDICINE

## 2018-10-16 PROCEDURE — 63710000001 PANTOPRAZOLE 40 MG TABLET DELAYED-RELEASE: Performed by: INTERNAL MEDICINE

## 2018-10-16 PROCEDURE — 63710000001 OXYBUTYNIN XL 10 MG TABLET SUSTAINED-RELEASE 24 HOUR: Performed by: INTERNAL MEDICINE

## 2018-10-16 PROCEDURE — 71046 X-RAY EXAM CHEST 2 VIEWS: CPT

## 2018-10-16 PROCEDURE — 63710000001 ESCITALOPRAM 20 MG TABLET: Performed by: INTERNAL MEDICINE

## 2018-10-16 PROCEDURE — 63710000001 GABAPENTIN 300 MG CAPSULE: Performed by: INTERNAL MEDICINE

## 2018-10-16 PROCEDURE — 63710000001 BUSPIRONE 15 MG TABLET: Performed by: INTERNAL MEDICINE

## 2018-10-16 PROCEDURE — 63710000001 PROPRANOLOL 20 MG TABLET: Performed by: INTERNAL MEDICINE

## 2018-10-16 PROCEDURE — 85610 PROTHROMBIN TIME: CPT | Performed by: INTERNAL MEDICINE

## 2018-10-16 RX ORDER — DOXYCYCLINE HYCLATE 100 MG
TABLET ORAL
Qty: 14 TABLET | Refills: 0 | Status: SHIPPED | OUTPATIENT
Start: 2018-10-16 | End: 2019-03-14

## 2018-10-16 RX ADMIN — BUSPIRONE HYDROCHLORIDE 15 MG: 15 TABLET ORAL at 08:55

## 2018-10-16 RX ADMIN — OXYBUTYNIN CHLORIDE 10 MG: 10 TABLET, EXTENDED RELEASE ORAL at 08:54

## 2018-10-16 RX ADMIN — GABAPENTIN 300 MG: 300 CAPSULE ORAL at 08:55

## 2018-10-16 RX ADMIN — ESCITALOPRAM OXALATE 20 MG: 20 TABLET ORAL at 05:44

## 2018-10-16 RX ADMIN — SPIRONOLACTONE 50 MG: 50 TABLET ORAL at 08:54

## 2018-10-16 RX ADMIN — ASPIRIN 81 MG: 81 TABLET, COATED ORAL at 05:45

## 2018-10-16 RX ADMIN — PANTOPRAZOLE SODIUM 40 MG: 40 TABLET, DELAYED RELEASE ORAL at 05:45

## 2018-10-16 RX ADMIN — PROPRANOLOL HYDROCHLORIDE 20 MG: 20 TABLET ORAL at 08:55

## 2018-10-16 RX ADMIN — METOLAZONE 2.5 MG: 2.5 TABLET ORAL at 08:55

## 2018-10-16 NOTE — PLAN OF CARE
Problem: Patient Care Overview  Goal: Plan of Care Review  Outcome: Ongoing (interventions implemented as appropriate)   10/16/18 0045   Coping/Psychosocial   Plan of Care Reviewed With patient   Plan of Care Review   Progress improving   OTHER   Outcome Summary V/S stable. Procedural site intact with CHG covering. Small amount of old sangious drainage noted but no bleeding or swelling. Ambulates with assist of 1 to BR. Pt is A-V paced 100%. Discomfort relieved with ice packs to left shoulder prn per pt & oral lortab.      10/16/18 0045   Coping/Psychosocial   Plan of Care Reviewed With patient   Plan of Care Review   Progress improving   OTHER   Outcome Summary V/S stable. Procedural site intact with CHG covering. Small amount of old sangious drainage noted but no bleeding or swelling. Ambulates with assist of 1 to BR. Pt is A-V paced 100%. Discomfort relieved with ice packs to left shoulder prn per pt & oral lortab.       Problem: Cardiac Rhythm Management Device (Adult)  Goal: Signs and Symptoms of Listed Potential Problems Will be Absent, Minimized or Managed (Cardiac Rhythm Management Device)  Outcome: Ongoing (interventions implemented as appropriate)   10/16/18 0045   Goal/Outcome Evaluation   Problems Assessed (Cardiac Rhythm Management Device) all   Problems Present (Cardiac Rhythm Dev) none

## 2018-10-16 NOTE — PROGRESS NOTES
"[facility] Discharge Note       LOS: 0 days   Patient Care Team:  Sreekanth Shi APRN as PCP - General (Family Medicine)    Chief Complaint:  Bradycardia/fall/sinus node dysfunction    Subjective     Subjective: No complaints this morning aside from the fact that her chest is a little bit sore.  Overall she feels good.    Review of Systems:   As above.    Medications:    aspirin 81 mg Oral QAM   atorvastatin 40 mg Oral Nightly   busPIRone 15 mg Oral Q12H   escitalopram 20 mg Oral QAM   gabapentin 300 mg Oral BID   metOLazone 2.5 mg Oral Daily   O2 3 L/min Inhalation Daily   oxybutynin XL 10 mg Oral Daily   pantoprazole 40 mg Oral QAM   propranolol 20 mg Oral Daily   sodium chloride 3 mL Intravenous Q12H   spironolactone 50 mg Oral Daily       Objective     Vital Sign Min/Max for last 24 hours  Temp  Min: 97.5 °F (36.4 °C)  Max: 97.5 °F (36.4 °C)   BP  Min: 81/53  Max: 157/71   Pulse  Min: 52  Max: 60   Resp  Min: 16  Max: 16   SpO2  Min: 90 %  Max: 100 %   Flow (L/min)  Min: 2  Max: 15   No Data Recorded    No intake or output data in the 24 hours ending 10/16/18 0703     Flowsheet Rows      First Filed Value   Admission Height  152.4 cm (60\") Documented at 10/15/2018 0701   Admission Weight  81.2 kg (179 lb 0.2 oz) Documented at 10/15/2018 0701          Physical Exam:    General: Alert and oriented.   Cardiovascular: Heart has a nondisplaced focal PMI. Regular rate and rhythm without murmur, gallop or rub.  The pacemaker site is clean and dry.  A minimal amount of dried blood is present on the bandage.  Extremities: Show trace edema.   Skin: warm and dry.     Results Review:    I reviewed the patient's new clinical results.  EKG:  Tele: A paced     Labs:      Results from last 7 days  Lab Units 10/15/18  0936 10/14/18  1457   SODIUM mmol/L 138 141   POTASSIUM mmol/L 3.8 5.0   CHLORIDE mmol/L 106 108   CO2 mmol/L 26.0 28.0   BUN mg/dL 31* 37*   CREATININE mg/dL 1.28 1.37*   CALCIUM mg/dL 8.7 9.0 "   BILIRUBIN mg/dL  --  0.3   ALK PHOS U/L  --  93   ALT (SGPT) U/L  --  11   AST (SGOT) U/L  --  26   GLUCOSE mg/dL 90 90       Results from last 7 days  Lab Units 10/14/18  1457   WBC 10*3/mm3 11.21*   HEMOGLOBIN g/dL 10.5*   HEMATOCRIT % 34.6   PLATELETS 10*3/mm3 335     Lab Results   Component Value Date    TROPONINI 0.021 09/18/2017    TROPONINI <0.200 07/11/2016    TROPONINI <0.200 07/11/2016     Lab Results   Component Value Date    CHOL 136 07/11/2016     Lab Results   Component Value Date    TRIG 163 (H) 07/11/2016     Lab Results   Component Value Date    HDL 37 (L) 07/11/2016     No components found for: LDLCALC  Lab Results   Component Value Date    INR 0.98 10/16/2018    INR 1.01 06/13/2018    INR 0.92 07/11/2016    PROTIME 10.3 10/16/2018    PROTIME 10.6 06/13/2018    PROTIME 10.0 07/11/2016         Ejection Fraction:    Assessment   Assessment:  1. Symptomatic bradycardia secondary to reversible sinus node dysfunction with associated falls  a. Status post permanent pacemaker placement October 15, 2018 using a [employer] MRI DR   2. Coronary artery disease:  a. Cardiac catheterization: Dr. Warner,  01/06/2009.   i. PCI to proximal LAD (2.75 x 28 mm Promus CIRO).     b. Cardiac catheterization: Dr. Mejia, 08/03/2009: No in-stent restenosis or coronary artery obstruction; normal LV function.  c. Echocardiogram, 9/19/2017: EF >70. Moderate concentric hypertrophy. Diastolic dysfunction. RV mildly dilated with reduced systolic function. Relative systolic sparing of the apex. RV:LV ratio is 1.0.   3. Hypertension.   4. DVT  a. Duplex Venous Lower Extremity, 9/19/17. Acute right lower extremity deep vein thrombosis noted in the common femoral, proximal femoral and peroneal. Acute right lower extremity superficial thrombosis noted in the saphenofemoral junction. All other veins appeared normal bilaterally.   5. Dyslipidemia.   6. PFO versus small ASD (echocardiogram on 05/06/2011) - asymptomatic.     7. Morbid obesity; BMI > 40.    8. Osteoarthritis.       Plan:    Discharge home today.  No change in medications.  Restart her Eliquis September 18, 2018  Doxycycline 100 mg twice a day ×7 days for lower extremity cellulitic change    Tracy Diego MD  10/16/18  7:03 AM

## 2018-10-16 NOTE — PLAN OF CARE
Problem: Patient Care Overview  Goal: Plan of Care Review  Outcome: Ongoing (interventions implemented as appropriate)   10/16/18 0839   Coping/Psychosocial   Plan of Care Reviewed With patient   Plan of Care Review   Progress improving   OTHER   Outcome Summary VSS, NO C/O PAIN, READY FOR DC HOME.     Goal: Individualization and Mutuality  Outcome: Ongoing (interventions implemented as appropriate)    Goal: Discharge Needs Assessment  Outcome: Ongoing (interventions implemented as appropriate)    Goal: Interprofessional Rounds/Family Conf  Outcome: Ongoing (interventions implemented as appropriate)      Problem: Cardiac Rhythm Management Device (Adult)  Goal: Signs and Symptoms of Listed Potential Problems Will be Absent, Minimized or Managed (Cardiac Rhythm Management Device)  Outcome: Ongoing (interventions implemented as appropriate)

## 2018-10-24 ENCOUNTER — OFFICE VISIT (OUTPATIENT)
Dept: CARDIOLOGY | Facility: CLINIC | Age: 83
End: 2018-10-24

## 2018-10-24 DIAGNOSIS — R00.1 BRADYCARDIA: Primary | ICD-10-CM

## 2018-10-24 PROCEDURE — 99024 POSTOP FOLLOW-UP VISIT: CPT | Performed by: INTERNAL MEDICINE

## 2018-10-24 NOTE — PROGRESS NOTES
Iliana Oleary  1934  84 y.o.  [phone]        10/24/2018    Sreekanth Shi, APRN    No chief complaint on file.      Problem List:  1.  Coronary artery disease:  a. Cardiac cath: Dr. Warner,  01/06/2009: PCI to proximal LAD (2.75 x 28 mm Promus CIRO).     b. Cardiac cath: Dr. Mejia, 08/03/2009: No in-stent restenosis or coronary artery obstruction; normal LV function.  c. Adenosine Cardiolite stress test, 04/30/2014: Negative for ischemia and scar; LVEF (73%) without WMA.   d. Stress test with PET, 7/11/2016: EF 67%. No evidence of inducible ischemia.  e. Echocardiogram, 7/11/2016: EF 60%. Mild MR. Mild TR.  f. Echocardiogram, 9/19/2017: EF >70. Moderate concentric hypertrophy. Diastolic dysfunction. RV mildly dilated with reduced systolic function. Relative systolic sparing of the apex. RV:LV ratio is 1.0.   2. Severe bradycardia due to irreversible sinus node dysfunction:  a. PPM [place], 10[place]15/2018[place] [facility] [place] model L311 serial #[id number]. Pacing mode is DDDR with a lower rate of 60 upper rate of 120.  3. Hypertension.   4. DVT  a. Duplex Venous Lower Extremity, 9/19/17. Acute right lower extremity DVT noted in the common femoral, proximal femoral and peroneal. Acute right lower extremity superficial thrombosis noted in the saphenofemoral junction. All other veins appeared normal bilaterally.   5. Dyslipidemia.   6. PFO versus small ASD (echocardiogram on 05/06/2011) - asymptomatic.    7. Morbid obesity; BMI > 40.    8. Osteoarthritis.   9. Status post surgery, remote:  a. Hemorrhoidectomy, 1966.  b. Right  heel spur surgery, 1988.  c. Left ankle fracture stabilization, 1988.  d. Left hand trigger finger; Right hand trigger finger repair, 1996.    e. Cholecystectomy, May 2000.  f. Left ankle screw and bone chip removal, November 2001.  g. Anal fistulectomy,  October 2002.  h. Right flank abscess (staph), May 2005.  i. Left total knee replacement, October 2007.  j. Right knee  replacement, August 2016    Allergies   Allergen Reactions   • Codeine Nausea And Vomiting     N/V   • Morphine And Related Nausea And Vomiting     N/V   • Oxycodone Rash       Current Medications:      Current Outpatient Prescriptions:   •  acetaminophen (TYLENOL) 325 MG tablet, Take 2 tablets by mouth Every 6 (Six) Hours As Needed for Mild Pain ., Disp: , Rfl:   •  apixaban (ELIQUIS) 5 MG tablet tablet, Take 1 tablet by mouth Every 12 (Twelve) Hours. Resume medication on 10/18/2018, Disp: 60 tablet, Rfl: 11  •  aspirin 81 MG EC tablet, Take 81 mg by mouth Every Morning., Disp: , Rfl:   •  atorvastatin (LIPITOR) 40 MG tablet, Take 40 mg by mouth Every Night., Disp: , Rfl:   •  busPIRone (BUSPAR) 15 MG tablet, Take 15 mg by mouth 2 (Two) Times a Day As Needed., Disp: , Rfl:   •  doxycycline (VIBRAMYICN) 100 MG tablet, Take one twice a days for 7 days., Disp: 14 tablet, Rfl: 0  •  escitalopram (LEXAPRO) 20 MG tablet, Take 20 mg by mouth Every Morning., Disp: , Rfl:   •  gabapentin (NEURONTIN) 300 MG capsule, Take 300 mg by mouth 2 (Two) Times a Day., Disp: , Rfl:   •  lansoprazole (PREVACID) 30 MG capsule, Take 30 mg by mouth daily., Disp: , Rfl:   •  metOLazone (ZAROXOLYN) 2.5 MG tablet, Take 1 tablet by mouth Daily., Disp: 30 tablet, Rfl: 11  •  Multiple Vitamins-Minerals (MULTIVITAMIN PO), Take 1 tablet by mouth daily., Disp: , Rfl:   •  O2 (OXYGEN), Inhale 3 L/min Daily. 24/7, Disp: , Rfl:   •  oxybutynin XL (DITROPAN-XL) 10 MG 24 hr tablet, TK 1 T PO HS, Disp: , Rfl: 2  •  propranolol (INDERAL) 20 MG tablet, Take 20 mg by mouth Daily., Disp: , Rfl:   •  spironolactone (ALDACTONE) 50 MG tablet, Take 1 tablet by mouth Daily., Disp: 30 tablet, Rfl: 11  •  traMADol (ULTRAM) 50 MG tablet, Take 50 mg by mouth Every 4 (Four) Hours As Needed. Takes 2 tablets q 6 hours, Disp: , Rfl: 0    HPI    Iliana P Anirudh is a 84 y.o. female who presents today for wound check 9 days s/p BSC PPM implantation.    The following  portions of the patient's history were reviewed and updated as appropriate: allergies, current medications and problem list.    Pertinent positives as listed in the HPI.  All other systems reviewed are negative.    There were no vitals filed for this visit.    Physical Exam:    General: Alert and oriented  The wound is clean and dry.  The bandage was removed and there is no erythema.  The edges are very well opposed.  There is no fluctuance or warmth noted.  Additionally the open sore on her left lateral leg has healed completely.    Diagnostic Data:    Procedures    Assessment:      ICD-10-CM ICD-9-CM   1. Bradycardia R00.1 427.89       Plan:      1. Continue current medications.  2. F/up in 3 months with a [employer] check for chronic thresholds or sooner if needed.    Scribed for Tracy Diego MD by Kristi Ruiz. 10/24/2018  4:25 PM     I Tracy Diego MD personally performed the services described in this documentation as scribed by the above individual in my presence, and it is both accurate and complete.    Tracy Diego MD, FACC

## 2018-11-07 RX ORDER — METOLAZONE 2.5 MG/1
2.5 TABLET ORAL DAILY
Qty: 90 TABLET | Refills: 0 | Status: SHIPPED | OUTPATIENT
Start: 2018-11-07 | End: 2019-02-03 | Stop reason: SDUPTHER

## 2018-11-08 ENCOUNTER — CLINICAL SUPPORT NO REQUIREMENTS (OUTPATIENT)
Dept: CARDIOLOGY | Facility: CLINIC | Age: 83
End: 2018-11-08

## 2018-11-08 DIAGNOSIS — R00.1 BRADYCARDIA: Primary | ICD-10-CM

## 2018-11-08 DIAGNOSIS — I44.0 FIRST DEGREE AV BLOCK: ICD-10-CM

## 2018-11-12 RX ORDER — SPIRONOLACTONE 50 MG/1
50 TABLET, FILM COATED ORAL DAILY
Qty: 90 TABLET | Refills: 0 | Status: SHIPPED | OUTPATIENT
Start: 2018-11-12

## 2019-02-04 RX ORDER — METOLAZONE 2.5 MG/1
2.5 TABLET ORAL DAILY
Qty: 90 TABLET | Refills: 0 | Status: SHIPPED | OUTPATIENT
Start: 2019-02-04 | End: 2019-05-02 | Stop reason: SDUPTHER

## 2019-03-14 ENCOUNTER — OFFICE VISIT (OUTPATIENT)
Dept: CARDIOLOGY | Facility: CLINIC | Age: 84
End: 2019-03-14

## 2019-03-14 VITALS
WEIGHT: 180 LBS | SYSTOLIC BLOOD PRESSURE: 128 MMHG | BODY MASS INDEX: 35.34 KG/M2 | HEIGHT: 60 IN | OXYGEN SATURATION: 94 % | HEART RATE: 73 BPM | DIASTOLIC BLOOD PRESSURE: 62 MMHG

## 2019-03-14 DIAGNOSIS — I49.5 SINUS NODE DYSFUNCTION (HCC): ICD-10-CM

## 2019-03-14 DIAGNOSIS — E78.5 DYSLIPIDEMIA: ICD-10-CM

## 2019-03-14 DIAGNOSIS — Q21.12 PFO (PATENT FORAMEN OVALE): ICD-10-CM

## 2019-03-14 DIAGNOSIS — I10 ESSENTIAL HYPERTENSION: ICD-10-CM

## 2019-03-14 DIAGNOSIS — I25.10 CORONARY ARTERY DISEASE INVOLVING NATIVE CORONARY ARTERY OF NATIVE HEART WITHOUT ANGINA PECTORIS: Primary | Chronic | ICD-10-CM

## 2019-03-14 DIAGNOSIS — R00.1 BRADYCARDIA: ICD-10-CM

## 2019-03-14 PROBLEM — I44.0 FIRST DEGREE AV BLOCK: Status: RESOLVED | Noted: 2018-10-08 | Resolved: 2019-03-14

## 2019-03-14 PROCEDURE — 99213 OFFICE O/P EST LOW 20 MIN: CPT | Performed by: NURSE PRACTITIONER

## 2019-03-14 PROCEDURE — 93280 PM DEVICE PROGR EVAL DUAL: CPT | Performed by: NURSE PRACTITIONER

## 2019-03-14 NOTE — PROGRESS NOTES
Iliana Oleary  1934  84 y.o.  [phone]      Date: 03/14/2019    PCP: Sreekanth Shi APRN    Chief Complaint   Patient presents with   • Coronary Artery Disease       Problem List:  1. Coronary artery disease:  a. [facility],  01/06/2009, Dr. Warner: PCI to proximal LAD (2.75 x 28 mm Promus CIRO).     b. [facility], 08/03/2009, Dr. Mejia: No ISR or coronary artery obstruction; normal LV function.  c. Adenosine Cardiolite stress test, 04/30/2014: Negative for ischemia and scar; EF 73% without WMA.   d. Stress PET, 7/11/2016: EF 67%. No evidence of inducible ischemia.  e. Echocardiogram, 7/11/2016: EF 60%. Mild MR/TR.  f. Echocardiogram, 09/19/2017: EF >70%. Moderate concentric LVH. Diastolic dysfunction. Mildly dilated RV with reduced systolic function. Relative systolic sparing of the apex. RV:LV ratio is 1.0.   2. Severe bradycardia due to irreversible sinus node dysfunction:  a. Holter monitor, 10/01/2018: SR, first-degree AV block, 31 pauses. Needs PPM.  b. PPM implantation, 10/15/2018: [facility] Accolade MRI  model L311 serial #[id number]. Pacing mode is DDDR with a lower rate of 60 upper rate of 120.  3. Hypertension.   4. DVT:  a. Duplex Venous BLE, 09/19/2017. Acute RLE DVT in the common femoral, proximal femoral and peroneal. Acute RLE superficial thrombosis noted in the saphenofemoral junction. All other veins appeared normal bilaterally.   5. Dyslipidemia.   6. PFO vs. small ASD (echocardiogram on 05/06/2011) - asymptomatic.    7. Morbid obesity; BMI > 40.    8. Osteoarthritis.   9. Status post surgery, remote:  a. Hemorrhoidectomy, 1966.  b. Right  heel spur surgery, 1988.  c. Left ankle fracture stabilization, 1988.  d. Left hand trigger finger; Right hand trigger finger repair, 1996.    e. Cholecystectomy, May 2000.  f. Left ankle screw and bone chip removal, November 2001.  g. Anal fistulectomy,  October 2002.  h. Right flank abscess (staph), May 2005.  i. Left total knee replacement, October  2007.  j. Right knee replacement, August 2016    Allergies   Allergen Reactions   • Codeine Nausea And Vomiting     N/V   • Morphine And Related Nausea And Vomiting     N/V   • Oxycodone Rash       Current Medications:      Current Outpatient Medications:   •  apixaban (ELIQUIS) 5 MG tablet tablet, Take 1 tablet by mouth Every 12 (Twelve) Hours. Resume medication on 10/18/2018, Disp: 60 tablet, Rfl: 11  •  aspirin 81 MG EC tablet, Take 81 mg by mouth Every Morning., Disp: , Rfl:   •  atorvastatin (LIPITOR) 40 MG tablet, Take 40 mg by mouth Every Night., Disp: , Rfl:   •  busPIRone (BUSPAR) 15 MG tablet, Take 15 mg by mouth 2 (Two) Times a Day As Needed., Disp: , Rfl:   •  escitalopram (LEXAPRO) 20 MG tablet, Take 20 mg by mouth Every Morning., Disp: , Rfl:   •  gabapentin (NEURONTIN) 300 MG capsule, Take 300 mg by mouth 2 (Two) Times a Day., Disp: , Rfl:   •  lansoprazole (PREVACID) 30 MG capsule, Take 30 mg by mouth daily., Disp: , Rfl:   •  metOLazone (ZAROXOLYN) 2.5 MG tablet, TAKE 1 TABLET BY MOUTH DAILY, Disp: 90 tablet, Rfl: 0  •  Multiple Vitamins-Minerals (MULTIVITAMIN PO), Take 1 tablet by mouth daily., Disp: , Rfl:   •  O2 (OXYGEN), Inhale 3 L/min Daily. 24/7, Disp: , Rfl:   •  oxybutynin XL (DITROPAN-XL) 10 MG 24 hr tablet, TK 1 T PO HS, Disp: , Rfl: 2  •  propranolol (INDERAL) 20 MG tablet, Take 20 mg by mouth Daily., Disp: , Rfl:   •  spironolactone (ALDACTONE) 50 MG tablet, TAKE 1 TABLET BY MOUTH DAILY, Disp: 90 tablet, Rfl: 0  •  traMADol (ULTRAM) 50 MG tablet, Take 50 mg by mouth Every 4 (Four) Hours As Needed. Takes 2 tablets q 6 hours, Disp: , Rfl: 0    HPI    Iliana Oleary is a 84 y.o. female who presents today for 4 month follow up of symptomatic bradycardia s/p PPM, coronary artery disease, DVT, hypertension, and hyperlipidemia. Since last visit, patient has been feeling better since having her pacemaker implanted.  She states that she has noticed more energy though is still quite sedentary  "in daily life.  I recommended that she try to get a set sleep pattern to help prevent her sleeping so late during the day.  She denies having any chest pain, shortness of breath, dyspnea on exertion, edema, palpitations, dizziness and syncope.  Overall, she seems to be doing well from a cardiac standpoint.  Device interrogation noted below with changes.      The following portions of the patient's history were reviewed and updated as appropriate: allergies, current medications and problem list.    Pertinent positives as listed in the HPI.  All other systems reviewed are negative.    Vitals:    03/14/19 1335   BP: 128/62   BP Location: Right arm   Patient Position: Sitting   Pulse: 73   SpO2: 94%   Weight: 81.6 kg (180 lb)   Height: 152.4 cm (60\")       Physical Exam:    GENERAL: well-developed, well-nourished; in no acute distress.   NECK:  Carotid upstrokes are 2+ and  symmetrical without bruits.   LUNGS: Clear to auscultation bilaterally without wheezing, rhonchi, or rales noted.   CARDIOVASCULAR: The heart has a regular rate with a normal S1 and S2. There is no murmur, gallop, rub, or click appreciated. The PMI is nondisplaced.   NEUROLOGICAL: Nonfocal; Alert and oriented  PERIPHERAL VASCULAR:  Posterior tibial pulses are 2+ and symmetrical. There is trace peripheral edema.   SKIN:  Warm and dry  PSYCHIATRIC: normal mood and affect; behavior appropriate      Diagnostic Data:    Last lipid panel, August 2018:  Chol 145  Trig 103  HDL 48.7  LDL 76    Lab Results   Component Value Date    GLUCOSE 90 10/15/2018    BUN 31 (H) 10/15/2018    CREATININE 1.28 10/15/2018    EGFRIFNONA 40 (L) 10/15/2018    BCR 24.2 10/15/2018     10/15/2018    K 3.8 10/15/2018     10/15/2018    CO2 26.0 10/15/2018    CALCIUM 8.7 10/15/2018    PROTENTOTREF 5.1 (L) 09/20/2017    ALBUMIN 3.66 10/14/2018    LABIL2 0.9 09/20/2017    AST 26 10/14/2018    ALT 11 10/14/2018     Lab Results   Component Value Date    WBC 11.21 (H) " 10/14/2018    HGB 10.5 (L) 10/14/2018    HCT 34.6 10/14/2018    MCV 88.7 10/14/2018     10/14/2018       Procedures  DEVICE INTERROGATION:  3/14/2019, [facility] PPM L311: DDDR/60 RA pacing 84%, RV pacing 71%. P wave is 4.3 mV with a threshold of 1.5 V at 0.4 msec and an impedance of 681 ohms. R wave is 23.0 mV with a threshold of 0.8 V at 0.4 msec and an impedance of 1115 ohms. Battery longevity of 7.5 years.   Reprogramming: Chronic thresholds are now atrial at 3 V at 0.4 ms.  Ventricular is at 2 V at 0.4 ms  Assessment:      ICD-10-CM ICD-9-CM   1. Coronary artery disease involving native coronary artery of native heart without angina pectoris I25.10 414.01   2. Sinus node dysfunction (CMS/HCC) I49.5 427.81   3. PFO (patent foramen ovale) Q21.1 745.5   4. Essential hypertension I10 401.9   5. Dyslipidemia E78.5 272.4   6. Bradycardia R00.1 427.89       Plan:    1. Encouraged routine exercise and dietary modifications  2. Encouraged routine sleep-wake regimen  3. Continue all current medications.  4. F/up in 6 months with BSC or sooner if needed.    Seen independently by ALAN Castro on . 3/14/2019  2:03 PM

## 2019-04-17 ENCOUNTER — CLINICAL SUPPORT NO REQUIREMENTS (OUTPATIENT)
Dept: CARDIOLOGY | Facility: CLINIC | Age: 84
End: 2019-04-17

## 2019-04-17 DIAGNOSIS — R00.1 BRADYCARDIA: ICD-10-CM

## 2019-04-17 PROCEDURE — 93296 REM INTERROG EVL PM/IDS: CPT | Performed by: INTERNAL MEDICINE

## 2019-04-17 PROCEDURE — 93294 REM INTERROG EVL PM/LDLS PM: CPT | Performed by: INTERNAL MEDICINE

## 2019-05-02 RX ORDER — METOLAZONE 2.5 MG/1
2.5 TABLET ORAL DAILY
Qty: 90 TABLET | Refills: 1 | Status: SHIPPED | OUTPATIENT
Start: 2019-05-02 | End: 2019-11-01 | Stop reason: SDUPTHER

## 2019-07-18 ENCOUNTER — CLINICAL SUPPORT NO REQUIREMENTS (OUTPATIENT)
Dept: CARDIOLOGY | Facility: CLINIC | Age: 84
End: 2019-07-18

## 2019-07-18 DIAGNOSIS — R00.1 BRADYCARDIA: Primary | ICD-10-CM

## 2019-07-18 DIAGNOSIS — I44.0 FIRST DEGREE AV BLOCK: ICD-10-CM

## 2019-07-18 PROCEDURE — 93294 REM INTERROG EVL PM/LDLS PM: CPT | Performed by: INTERNAL MEDICINE

## 2019-07-18 PROCEDURE — 93296 REM INTERROG EVL PM/IDS: CPT | Performed by: INTERNAL MEDICINE

## 2019-10-16 ENCOUNTER — OFFICE VISIT (OUTPATIENT)
Dept: CARDIOLOGY | Facility: CLINIC | Age: 84
End: 2019-10-16

## 2019-10-16 VITALS
HEIGHT: 60 IN | OXYGEN SATURATION: 97 % | WEIGHT: 171.4 LBS | SYSTOLIC BLOOD PRESSURE: 124 MMHG | DIASTOLIC BLOOD PRESSURE: 68 MMHG | BODY MASS INDEX: 33.65 KG/M2 | HEART RATE: 60 BPM

## 2019-10-16 DIAGNOSIS — E78.5 DYSLIPIDEMIA: ICD-10-CM

## 2019-10-16 DIAGNOSIS — Z86.718 HISTORY OF DVT (DEEP VEIN THROMBOSIS): ICD-10-CM

## 2019-10-16 DIAGNOSIS — I10 ESSENTIAL HYPERTENSION: ICD-10-CM

## 2019-10-16 DIAGNOSIS — R00.1 BRADYCARDIA: ICD-10-CM

## 2019-10-16 DIAGNOSIS — I25.10 CORONARY ARTERY DISEASE INVOLVING NATIVE CORONARY ARTERY OF NATIVE HEART WITHOUT ANGINA PECTORIS: Primary | Chronic | ICD-10-CM

## 2019-10-16 PROCEDURE — 93280 PM DEVICE PROGR EVAL DUAL: CPT | Performed by: INTERNAL MEDICINE

## 2019-10-16 PROCEDURE — 99214 OFFICE O/P EST MOD 30 MIN: CPT | Performed by: INTERNAL MEDICINE

## 2019-10-16 NOTE — PROGRESS NOTES
[facility]  Iliana Oleary  1934  85 y.o.  [phone]        Date: 10/16/2019    PCP: Sreekanth Shi APRN    Chief Complaint   Patient presents with   • Coronary Artery Disease   • Slow Heart Rate       Problem List:  1. Coronary artery disease:  a. [facility],  01/06/2009, Dr. Warner: PCI to proximal LAD (2.75 x 28 mm Promus CIRO).     b. [facility], 08/03/2009, Dr. Mejia: No ISR or coronary artery obstruction; normal LV function.  c. Cardiolite stress test, 04/30/2014: Negative for ischemia and scar. EF 73% without WMA.   d. Stress PET, 07/11/2016: EF 67%. No evidence of inducible ischemia.  e. Echocardiogram, 7/11/2016: EF 60%. Mild MR/TR.  f. Echocardiogram, 09/19/2017: EF >70%. Moderate concentric LVH. Diastolic dysfunction. Mildly dilated RV with reduced systolic function. Relative systolic sparing of the apex. RV:LV ratio is 1.0.   2. Severe bradycardia due to irreversible sinus node dysfunction:  a. Holter monitor, 10/01/2018: SR, 1st-degree AVB, 31 pauses. Needs PPM.  b. PPM implantation, 10/15/2018: [facility] [place] [facility] Accolade MRI DR model L311 serial #[id number]. DDDR .  c. DDDR switched to Rhythmiq for intrinsic conduction at the time of device interrogation 10/16/2019   3. Hypertension.   4. Atrial flutter with controlled ventricular response noted on device interrogation 10/27/2019  5. DVT:  a. Duplex Venous BLE, 09/19/2017. Acute RLE DVT in the common femoral, proximal femoral and peroneal. Acute RLE superficial thrombosis noted in the saphenofemoral junction. All other veins appeared normal bilaterally.   6. Dyslipidemia.   7. PFO vs. small ASD (echocardiogram on 05/06/2011) - asymptomatic.    8. Morbid obesity; BMI > 40.    9. Osteoarthritis.   10. Status post surgery, remote:  a. Hemorrhoidectomy, 1966.  b. Right  heel spur surgery, 1988.  c. Left ankle fracture stabilization, 1988.  d. Left hand trigger finger; Right hand trigger finger repair, 1996.     e. Cholecystectomy, May 2000.  f. Left ankle screw and bone chip removal, November 2001.  g. Anal fistulectomy,  October 2002.  h. Right flank abscess (staph), May 2005.  i. Left total knee replacement, October 2007.  j. Right knee replacement, August 2016    Allergies   Allergen Reactions   • Codeine Nausea And Vomiting     N/V   • Morphine And Related Nausea And Vomiting     N/V   • Oxycodone Rash       Current Medications:      Current Outpatient Medications:   •  apixaban (ELIQUIS) 5 MG tablet tablet, Take 1 tablet by mouth Every 12 (Twelve) Hours. Resume medication on 10/18/2018, Disp: 60 tablet, Rfl: 11  •  aspirin 81 MG EC tablet, Take 81 mg by mouth Every Morning., Disp: , Rfl:   •  atorvastatin (LIPITOR) 40 MG tablet, Take 40 mg by mouth Every Night., Disp: , Rfl:   •  busPIRone (BUSPAR) 15 MG tablet, Take 15 mg by mouth 2 (Two) Times a Day As Needed., Disp: , Rfl:   •  escitalopram (LEXAPRO) 20 MG tablet, Take 20 mg by mouth Every Morning., Disp: , Rfl:   •  gabapentin (NEURONTIN) 300 MG capsule, Take 300 mg by mouth 2 (Two) Times a Day., Disp: , Rfl:   •  lansoprazole (PREVACID) 30 MG capsule, Take 30 mg by mouth daily., Disp: , Rfl:   •  metOLazone (ZAROXOLYN) 2.5 MG tablet, TAKE 1 TABLET BY MOUTH DAILY, Disp: 90 tablet, Rfl: 1  •  Multiple Vitamins-Minerals (MULTIVITAMIN PO), Take 1 tablet by mouth daily., Disp: , Rfl:   •  O2 (OXYGEN), Inhale 3 L/min Daily. 24/7, Disp: , Rfl:   •  oxybutynin XL (DITROPAN-XL) 10 MG 24 hr tablet, TK 1 T PO HS, Disp: , Rfl: 2  •  propranolol (INDERAL) 20 MG tablet, Take 20 mg by mouth Daily., Disp: , Rfl:   •  spironolactone (ALDACTONE) 50 MG tablet, TAKE 1 TABLET BY MOUTH DAILY, Disp: 90 tablet, Rfl: 0  •  traMADol (ULTRAM) 50 MG tablet, Take 50 mg by mouth Every 4 (Four) Hours As Needed. Takes 2 tablets q 6 hours, Disp: , Rfl: 0    HPI    Iliana Oleary is a 85 y.o. female who presents today for 6 month follow up of coronary artery disease, bradycardia s/p  "pacemaker, hypertension, an dyslipidemia. Since last visit, she has been feeling well overall from a cardiovascular standpoint. She was previously on 24h oxygen, and is now only on nighttime oxygen, with daytime saturations around 96%. Pt's physical activity is limited by bilateral hip pain with radiation to her knees. She is unable to sit through Mass due to pain when sitting for prolonged periods of time. She ambulates with a walker. She reports some mild left LE edema yesterday, which resolved when she elevated her legs. Patient denies chest pain, palpitations, shortness of breath, dizziness, and syncope.     The following portions of the patient's history were reviewed and updated as appropriate: allergies, current medications and problem list.    Pertinent positives as listed in the HPI.  All other systems reviewed are negative.    Vitals:    10/16/19 1304   BP: 124/68   BP Location: Right arm   Patient Position: Sitting   Pulse: 60   SpO2: 97%   Weight: 77.7 kg (171 lb 6.4 oz)   Height: 152.4 cm (60\")       Physical Exam:    General: Alert and oriented.  Neck: Jugular venous pressure is within normal limits. Carotids have normal upstrokes without bruits.   Cardiovascular: Heart has a nondisplaced focal PMI. Regular rate and rhythm without murmur, gallop or rub.  Lungs: Clear without rales or wheezes. Equal expansion is noted.   Extremities: Show no edema.  Skin: Warm and dry.  Neurologic: Nonfocal.    Diagnostic Data:    Last lipid, 10/09/2019:      HDL 46  LDL 61      Procedures     MANUAL DEVICE INTERROGATION: 10/16/2019, [employer] pacemaker:   RA pacing 89%, RV pacing 83%.  P wave is 2.7 mV with a threshold of 1.3 V at 0.5 msec and an impedance of 698 ohms.   R wave is 19.5 mV with a threshold of 1.2 V at 0.5 msec and an impedance of 850 ohms.  Battery voltage is 7 years.  Events: none recorded.  Update: DDDR -> Rhythmiq for intrinsic conduction.     Assessment:      ICD-10-CM ICD-9-CM "   1. Coronary artery disease involving native coronary artery of native heart without angina pectoris I25.10 414.01   2. Bradycardia s/p pacemaker R00.1 427.89   3. Essential hypertension I10 401.9   4. Dyslipidemia E78.5 272.4   5. History of DVT (deep vein thrombosis) Z86.718 V12.51     Lab results found above were reviewed with the patient.    Plan:    1. Increase aerobic exercise as tolerated with hip and knee pain.  2. Continue aspirin 81 mg for CAD.  3. Continue metolazone, spironolactone for hypertension and fluid retention.  4. Continue Eliquis for DVT prophylaxis.  5. Continue atorvastatin 40 mg for hyperlipidemia.  6. Continue all other current medications.  7. F/up in 6 months with [facility] [place] [facility] device check, or sooner if needed.      Scribed for Tracy Diego MD by Kristi Ruiz. 10/16/2019  1:17 PM     I Tracy Diego MD personally performed the services described in this documentation as scribed by the above individual in my presence, and it is both accurate and complete.    Tracy Diego MD, [facility]

## 2019-10-25 RX ORDER — APIXABAN 5 MG/1
TABLET, FILM COATED ORAL
Qty: 180 TABLET | Refills: 3 | Status: SHIPPED | OUTPATIENT
Start: 2019-10-25 | End: 2022-01-01

## 2019-11-01 RX ORDER — METOLAZONE 2.5 MG/1
2.5 TABLET ORAL DAILY
Qty: 90 TABLET | Refills: 1 | Status: SHIPPED | OUTPATIENT
Start: 2019-11-01 | End: 2020-04-27

## 2019-11-14 ENCOUNTER — CLINICAL SUPPORT NO REQUIREMENTS (OUTPATIENT)
Dept: CARDIOLOGY | Facility: CLINIC | Age: 84
End: 2019-11-14

## 2019-11-14 DIAGNOSIS — I49.5 SINUS NODE DYSFUNCTION (HCC): ICD-10-CM

## 2019-11-14 PROCEDURE — 93294 REM INTERROG EVL PM/LDLS PM: CPT | Performed by: INTERNAL MEDICINE

## 2019-11-14 PROCEDURE — 93296 REM INTERROG EVL PM/IDS: CPT | Performed by: INTERNAL MEDICINE

## 2020-01-23 ENCOUNTER — TELEPHONE (OUTPATIENT)
Dept: GASTROENTEROLOGY | Facility: CLINIC | Age: 85
End: 2020-01-23

## 2020-01-23 ENCOUNTER — PREP FOR SURGERY (OUTPATIENT)
Dept: OTHER | Facility: HOSPITAL | Age: 85
End: 2020-01-23

## 2020-01-23 DIAGNOSIS — R19.7 DIARRHEA, UNSPECIFIED TYPE: ICD-10-CM

## 2020-01-23 DIAGNOSIS — R10.9 ABDOMINAL PAIN, UNSPECIFIED ABDOMINAL LOCATION: Primary | ICD-10-CM

## 2020-01-23 DIAGNOSIS — K59.00 CONSTIPATION, UNSPECIFIED CONSTIPATION TYPE: ICD-10-CM

## 2020-01-23 NOTE — TELEPHONE ENCOUNTER
Colon in hospital. +/- egd. They can also come to clinic first if they want to. PHILLY* talked to kelly

## 2020-01-23 NOTE — TELEPHONE ENCOUNTER
PATIENTS  CALLED STATES PATIENT IS HAVING CONSTIPATION AND DIARRHEA WITH RECTAL BLEEDING. DUE TO PATIENTS MEDICAL HX, AGE, WHEELCHAIR AND PACEMAKER, CAN YOU PLEASE REVIEW THE CHART AND LET ME KNOW IF YOU RECOMMEND OFFICE OR SCOPE? PATIENT IS A PREVIOUS [facility] PATIENT BUT HAS NOT SEEN HIM IN SEVERAL YEARS.

## 2020-01-23 NOTE — TELEPHONE ENCOUNTER
Patient is scheduled for colonoscopy and egd for Jan 29 with Dr. Graves. Please advise if patient is cleared from a cardiac standpoint and how long patient can hold her eliquis. Patient will have procedure at hospital and will attend preadmission testing.     Thank you.

## 2020-01-27 ENCOUNTER — TELEPHONE (OUTPATIENT)
Dept: GASTROENTEROLOGY | Facility: CLINIC | Age: 85
End: 2020-01-27

## 2020-01-27 ENCOUNTER — TELEPHONE (OUTPATIENT)
Dept: CARDIOLOGY | Facility: CLINIC | Age: 85
End: 2020-01-27

## 2020-01-27 DIAGNOSIS — Z12.11 SCREENING FOR COLON CANCER: Primary | ICD-10-CM

## 2020-01-27 NOTE — TELEPHONE ENCOUNTER
Yvette,  Patient's daughter called back. She stated that patient is currently on Eliquis. She has had it today. Nobody has told her to stop the Eliquis. Shayna sent Dr Diego a message to get a cardiac clearance on 1/23/2020. No response. Can you call Dr Diego's office to get a urgent request please? Patient hasn't had Eliquis today. Thanks

## 2020-01-27 NOTE — TELEPHONE ENCOUNTER
Per Dr. Diego, PT may hold Eliquis 2 days prior to upcoming colonoscopy with Dr. Graves-     Last venous scan in 10/2018 showed no evidence of DVT-     Relayed to Yvette at Dr. Graves's office

## 2020-01-27 NOTE — TELEPHONE ENCOUNTER
CALLED DR. FRYE OFFICE, SPOKE TO ILEANA; SHE IS OKAY WITH STOPPING ELIQUIS FOR 2 DAYS. ADVISED I WOULD CONTACT PATIENT WITH THAT INFORMATION. SHE IS ALSO PLACING A PHONE ENCOUNTER WITH DOCUMENTATION.

## 2020-01-27 NOTE — TELEPHONE ENCOUNTER
CALLED DAUGHTER (CHALINO) TO ADVISE THE HOLDING OF ELIQUIS FOR 2 DAYS PRIOR TO PROCEDURE WITH DR. PORTILLO. THEREFORE, ADVISED NOT TO TAKE ELIQUIS TODAY OR TOMORROW. ADVISED TO CONSULT WITH DR. PORTILLO AT PRE OP OF WHEN ELIQUIS IS OKAY TO RESTART POST OP. NO ANSWER. LEFT DETAILED VM.

## 2020-01-28 ENCOUNTER — APPOINTMENT (OUTPATIENT)
Dept: PREADMISSION TESTING | Facility: HOSPITAL | Age: 85
End: 2020-01-28

## 2020-01-28 VITALS — WEIGHT: 168 LBS | BODY MASS INDEX: 32.98 KG/M2 | HEIGHT: 60 IN

## 2020-01-28 LAB
DEPRECATED RDW RBC AUTO: 43.1 FL (ref 37–54)
ERYTHROCYTE [DISTWIDTH] IN BLOOD BY AUTOMATED COUNT: 12.5 % (ref 12.3–15.4)
HCT VFR BLD AUTO: 45.2 % (ref 34–46.6)
HGB BLD-MCNC: 14.1 G/DL (ref 12–15.9)
MCH RBC QN AUTO: 29.1 PG (ref 26.6–33)
MCHC RBC AUTO-ENTMCNC: 31.2 G/DL (ref 31.5–35.7)
MCV RBC AUTO: 93.4 FL (ref 79–97)
PLATELET # BLD AUTO: 227 10*3/MM3 (ref 140–450)
PMV BLD AUTO: 10.1 FL (ref 6–12)
POTASSIUM BLD-SCNC: 5.3 MMOL/L (ref 3.5–5.2)
RBC # BLD AUTO: 4.84 10*6/MM3 (ref 3.77–5.28)
WBC NRBC COR # BLD: 11.52 10*3/MM3 (ref 3.4–10.8)

## 2020-01-28 PROCEDURE — 36415 COLL VENOUS BLD VENIPUNCTURE: CPT

## 2020-01-28 PROCEDURE — 85027 COMPLETE CBC AUTOMATED: CPT | Performed by: ANESTHESIOLOGY

## 2020-01-28 PROCEDURE — 84132 ASSAY OF SERUM POTASSIUM: CPT | Performed by: ANESTHESIOLOGY

## 2020-01-28 NOTE — PAT
Patient is poor historian,  not with her.  Per patient was told to stop Equilis three days before procedure and remain on asa.

## 2020-01-28 NOTE — PAT
EKG and pacemaker check from [facility] 11/19.    The following information and instructions were given:    Do not eat, drink, smoke or chew gum after midnight the night before surgery. This also includes no mints.  Take all routine, prescribed medications including heart and blood pressure medicines with a sip of water unless otherwise instructed by your physician.   Do NOT take diabetic medication unless instructed by your physician.    DO NOT shave for two days before your procedure.  Do not wear makeup.      DO NOT wear fingernail polish (gel/regular) and/or acrylic/artificial nails on the day of surgery.   If you have had a recent manicure and would rather not remove polish or artificial nails, then the minimum requirement is that the polish/artificial nails must be removed from the middle finger on each hand.      If you are having surgery/procedure on an upper extremity, then fingernail polish/artificial fingernails must be removed for surgery.  NO EXCEPTIONS.      If you are having surgery/procedure on a lower extremity, then toenail polish on both extremities must be removed for surgery.  NO EXCEPTIONS.    Remove all jewelry (advise to go to jeweler if unable to remove).  Jewelry, especially rings, can no longer be taped for surgery.    Leave anything you consider valuable at home.    Leave your suitcase in the car until after your surgery.    Bring the following with you the day of your procedure (when applicable)       -picture ID and insurance cards       -Co-pay/deductible required by insurance       -Medications in the original bottles (not a list) including all over-the-counter medications if not brought to PAT       -Copy of advance directive, living will or power of  documents if not brought to PAT       -CPAP or BIPAP mask and tubing (do not bring machine)       -Skin prep instruction(s) sheet       -PAT Pass    Education booklet, brochure, handout or binder related to procedure given to  patient.  Education booklet also includes general information related to their recovery that mentions signs and symptoms of infection and when to call the doctor.    When applicable, an ERAS booklet was given to patient.    Pain Control After Surgery handout given to patient.    Respirex use (handout given to patient) and pneumonia prevention education provided.    Signs and Symptoms of infection were discussed with patient in Pre Admission testing.  Patient instructed to call their doctor if any of the following symptoms are noted during recovery:  fever of 100.4 F or higher, incision that is warm or has increasing bleeding, redness, or drainage.    DVT Prevention instructions given verbally during Pre Admission Testing appointment that stressed the importance of ambulation to improve blood circulation.  Also encouraged patient to perform foot exercises when in bed and that the application of a sequential device might be applied to lower extremities to improve circulation.      Please apply Chlorhexadine wipes to surgical area (if instructed) the night before procedure and the AM of procedure and document date/time of applications on skin prep instruction sheet.        ENDO instructions given to patient

## 2020-01-29 ENCOUNTER — ANESTHESIA EVENT (OUTPATIENT)
Dept: GASTROENTEROLOGY | Facility: HOSPITAL | Age: 85
End: 2020-01-29

## 2020-01-29 ENCOUNTER — ANESTHESIA (OUTPATIENT)
Dept: GASTROENTEROLOGY | Facility: HOSPITAL | Age: 85
End: 2020-01-29

## 2020-01-29 ENCOUNTER — HOSPITAL ENCOUNTER (OUTPATIENT)
Facility: HOSPITAL | Age: 85
Setting detail: HOSPITAL OUTPATIENT SURGERY
Discharge: HOME | End: 2020-01-29
Attending: INTERNAL MEDICINE | Admitting: INTERNAL MEDICINE

## 2020-01-29 VITALS
OXYGEN SATURATION: 96 % | WEIGHT: 168 LBS | HEIGHT: 60 IN | SYSTOLIC BLOOD PRESSURE: 121 MMHG | BODY MASS INDEX: 32.98 KG/M2 | HEART RATE: 62 BPM | TEMPERATURE: 97.3 F | DIASTOLIC BLOOD PRESSURE: 91 MMHG | RESPIRATION RATE: 16 BRPM

## 2020-01-29 DIAGNOSIS — K59.00 CONSTIPATION, UNSPECIFIED CONSTIPATION TYPE: ICD-10-CM

## 2020-01-29 DIAGNOSIS — R19.7 DIARRHEA, UNSPECIFIED TYPE: ICD-10-CM

## 2020-01-29 DIAGNOSIS — R10.9 ABDOMINAL PAIN, UNSPECIFIED ABDOMINAL LOCATION: ICD-10-CM

## 2020-01-29 PROCEDURE — 25010000002 PROPOFOL 10 MG/ML EMULSION: Performed by: NURSE ANESTHETIST, CERTIFIED REGISTERED

## 2020-01-29 PROCEDURE — 88305 TISSUE EXAM BY PATHOLOGIST: CPT | Performed by: INTERNAL MEDICINE

## 2020-01-29 PROCEDURE — S0260 H&P FOR SURGERY: HCPCS | Performed by: INTERNAL MEDICINE

## 2020-01-29 PROCEDURE — C1726 CATH, BAL DIL, NON-VASCULAR: HCPCS | Performed by: INTERNAL MEDICINE

## 2020-01-29 PROCEDURE — 25010000002 PHENYLEPHRINE PER 1 ML: Performed by: NURSE ANESTHETIST, CERTIFIED REGISTERED

## 2020-01-29 RX ORDER — FAMOTIDINE 10 MG/ML
20 INJECTION, SOLUTION INTRAVENOUS ONCE
Status: DISCONTINUED | OUTPATIENT
Start: 2020-01-29 | End: 2020-01-29 | Stop reason: HOSPADM

## 2020-01-29 RX ORDER — LIDOCAINE HYDROCHLORIDE 10 MG/ML
INJECTION, SOLUTION EPIDURAL; INFILTRATION; INTRACAUDAL; PERINEURAL AS NEEDED
Status: DISCONTINUED | OUTPATIENT
Start: 2020-01-29 | End: 2020-01-29 | Stop reason: SURG

## 2020-01-29 RX ORDER — SODIUM CHLORIDE 9 MG/ML
20 INJECTION, SOLUTION INTRAVENOUS ONCE
Status: COMPLETED | OUTPATIENT
Start: 2020-01-29 | End: 2020-01-29

## 2020-01-29 RX ORDER — PROPOFOL 10 MG/ML
VIAL (ML) INTRAVENOUS AS NEEDED
Status: DISCONTINUED | OUTPATIENT
Start: 2020-01-29 | End: 2020-01-29 | Stop reason: SURG

## 2020-01-29 RX ADMIN — SODIUM CHLORIDE: 9 INJECTION, SOLUTION INTRAVENOUS at 12:50

## 2020-01-29 RX ADMIN — PROPOFOL 60 MG: 10 INJECTION, EMULSION INTRAVENOUS at 12:55

## 2020-01-29 RX ADMIN — LIDOCAINE HYDROCHLORIDE 50 MG: 10 INJECTION, SOLUTION EPIDURAL; INFILTRATION; INTRACAUDAL; PERINEURAL at 12:55

## 2020-01-29 RX ADMIN — PROPOFOL 50 MCG/KG/MIN: 10 INJECTION, EMULSION INTRAVENOUS at 12:55

## 2020-01-29 RX ADMIN — PHENYLEPHRINE HYDROCHLORIDE 100 MCG: 10 INJECTION INTRAVENOUS at 13:04

## 2020-01-29 NOTE — ANESTHESIA PREPROCEDURE EVALUATION
Anesthesia Evaluation     Patient summary reviewed and Nursing notes reviewed   NPO Solid Status: > 8 hours  NPO Liquid Status: > 8 hours           Airway   Mallampati: II  TM distance: >3 FB  Neck ROM: limited  No difficulty expected  Dental          Pulmonary    (+) pulmonary embolism (unclear per pt), home oxygen, shortness of breath,   (-) COPD, recent URI, lung cancer  Cardiovascular     ECG reviewed  Patient on routine beta blocker    (+) pacemaker pacemaker interrogated <1 month ago, hypertension, valvular problems/murmurs (PFO ) murmur, past MI  >12 months, CAD, dysrhythmias,     ROS comment: ECHO EF > 70.Mod concentric LVH  LVDD (grade I a)- impaired relaxation.  RV cavity is mildly dilated.  Mildly reduced RV fxn      Neuro/Psych  (+) psychiatric history (Memory loss?) Depression and Anxiety,     (-) seizures, CVA  GI/Hepatic/Renal/Endo    (+)   hepatitis, liver disease,   (-) diabetes, no thyroid disorderRenal disease: creat 1.28     Musculoskeletal     Abdominal    Substance History      OB/GYN          Other   arthritis,      ROS/Med Hx Other: Poor historian possible dementia   No N and V       Phys Exam Other: Partial plate removable                Anesthesia Plan    ASA 3     general   (Risk for POCD)  intravenous induction     Anesthetic plan, all risks, benefits, and alternatives have been provided, discussed and informed consent has been obtained with: patient.    Plan discussed with CRNA.

## 2020-01-30 LAB
CYTO UR: NORMAL
LAB AP CASE REPORT: NORMAL
LAB AP CLINICAL INFORMATION: NORMAL
PATH REPORT.FINAL DX SPEC: NORMAL
PATH REPORT.GROSS SPEC: NORMAL

## 2020-02-13 ENCOUNTER — CLINICAL SUPPORT NO REQUIREMENTS (OUTPATIENT)
Dept: CARDIOLOGY | Facility: CLINIC | Age: 85
End: 2020-02-13

## 2020-02-13 DIAGNOSIS — R00.1 BRADYCARDIA: ICD-10-CM

## 2020-02-13 PROCEDURE — 93296 REM INTERROG EVL PM/IDS: CPT | Performed by: INTERNAL MEDICINE

## 2020-02-13 PROCEDURE — 93294 REM INTERROG EVL PM/LDLS PM: CPT | Performed by: INTERNAL MEDICINE

## 2020-04-09 ENCOUNTER — TELEPHONE (OUTPATIENT)
Dept: CARDIOLOGY | Facility: CLINIC | Age: 85
End: 2020-04-09

## 2020-04-09 NOTE — TELEPHONE ENCOUNTER
Called to discuss options for 4/22/2020 appt- no answer no vm- will mail letter to have PT call the office

## 2020-04-21 NOTE — PROGRESS NOTES
[facility] Cardiology  Telehealth follow-up note    Patient ID: Iliana Oleary is a  86 y.o. female.  : 1934   Contact: [phone]     Encounter date: 2020    PCP: Sreekanth Shi APRN      Chief complaint:   Chief Complaint   Patient presents with   • Coronary Artery Disease       Problem List:  1. Coronary artery disease:  a. [facility],  2009, Dr. Warner: PCI to proximal LAD (2.75 x 28 mm Promus CIOR).     b. [facility], 2009, Dr. Mejia: No ISR or coronary artery obstruction; normal LV function.  c. Cardiolite stress test, 2014: Negative for ischemia and scar. EF 73% without WMA.   d. Stress PET, 2016: EF 67%. No evidence of inducible ischemia.  e. Echocardiogram, 2016: EF 60%. Mild MR/TR.  f. Echocardiogram, 2017: EF >70%. Moderate concentric LVH. Diastolic dysfunction. Mildly dilated RV with reduced systolic function. Relative systolic sparing of the apex. RV:LV ratio is 1.0.   2. Severe bradycardia due to irreversible sinus node dysfunction:  a. Holter monitor, 10/01/2018: SR, 1st-degree AVB, 31 pauses. Needs PPM.  b. PPM implantation, 10/15/2018: [facility] [employer] MRI DR model L311 serial #[id number]. DDDR .  c. DDDR switched to Rhythmiq for intrinsic conduction at the time of device interrogation 10/16/2019.   3. Hypertension.   4. Atrial flutter with controlled ventricular response noted on device interrogation 10/27/2019  5. DVT:  a. Duplex Venous BLE, 2017. Acute RLE DVT in the common femoral, proximal femoral and peroneal. Acute RLE superficial thrombosis noted in the saphenofemoral junction. All other veins appeared normal bilaterally.   6. Dyslipidemia.   7. PFO vs. small ASD (echocardiogram on 2011) - asymptomatic.    8. Morbid obesity; BMI > 40.    9. Osteoarthritis.   10. Status post surgery, remote:  a. Hemorrhoidectomy, .  b. Right  heel spur surgery, .  c. Left ankle fracture stabilization,  1988.  d. Left hand trigger finger; Right hand trigger finger repair, 1996.    e. Cholecystectomy, May 2000.  f. Left ankle screw and bone chip removal, November 2001.  g. Anal fistulectomy,  October 2002.  h. Right flank abscess (staph), May 2005.  i. Left total knee replacement, October 2007.  j. Right knee replacement, August 2016    Allergies   Allergen Reactions   • Chlorhexidine Rash   • Codeine Nausea And Vomiting     N/V   • Morphine And Related Nausea And Vomiting     N/V   • Oxycodone Rash       Current Medications:    Current Outpatient Medications:   •  aspirin 81 MG EC tablet, Take 81 mg by mouth Every Morning., Disp: , Rfl:   •  atorvastatin (LIPITOR) 40 MG tablet, Take 40 mg by mouth Every Night., Disp: , Rfl:   •  busPIRone (BUSPAR) 15 MG tablet, Take 15 mg by mouth 3 (Three) Times a Day., Disp: , Rfl:   •  ELIQUIS 5 MG tablet tablet, TAKE 1 TABLET BY MOUTH EVERY 12 HOURS. RESUME MEDICATION ON 10/18/18 (Patient taking differently: 5 mg Every 12 (Twelve) Hours.), Disp: 180 tablet, Rfl: 3  •  escitalopram (LEXAPRO) 20 MG tablet, Take 20 mg by mouth Every Morning., Disp: , Rfl:   •  gabapentin (NEURONTIN) 300 MG capsule, Take 300 mg by mouth 3 (Three) Times a Day., Disp: , Rfl:   •  lansoprazole (PREVACID) 30 MG capsule, Take 30 mg by mouth daily., Disp: , Rfl:   •  metOLazone (ZAROXOLYN) 2.5 MG tablet, Take 1 tablet by mouth Daily., Disp: 90 tablet, Rfl: 1  •  Multiple Vitamins-Minerals (MULTIVITAMIN PO), Take 1 tablet by mouth daily., Disp: , Rfl:   •  O2 (OXYGEN), Inhale 3 L/min Daily. Per patient uses at night, Disp: , Rfl:   •  oxybutynin XL (DITROPAN-XL) 10 MG 24 hr tablet, 10 mg Daily., Disp: , Rfl: 2  •  propranolol (INDERAL) 20 MG tablet, Take 20 mg by mouth Daily., Disp: , Rfl:   •  spironolactone (ALDACTONE) 50 MG tablet, TAKE 1 TABLET BY MOUTH DAILY, Disp: 90 tablet, Rfl: 0  •  traMADol (ULTRAM) 50 MG tablet, Take 50 mg by mouth Every 6 (Six) Hours As Needed. Takes 2 tablets q 6 hours, Disp:  ", Rfl: 0    HPI    Iliana Oleary is a 86 y.o. female who has a telephone visit today for a 6 month follow up of coronary artery disease, bradycardia s/p pacemaker, hypertension, and dyslipidemia. Since last visit, Iliana has done fairly well from a cardiac standpoint.  Unfortunately she has extreme pain from her hips all the way down to her knees and was due to have an interventional pain procedure just prior to the coronavirus outbreak.  As a result it was canceled and never performed.  She has a very difficult time walking and currently has a chair lift that takes her up and down the stairs.  Her  brings her most of her meals.  She would like to become more active and we discussed the possibility of obtaining an exercise bike and putting in the family room downstairs further television is.  She states that she could use it and she could get down stairs to use it.  Right now she is just limited by her pain.    She has no chest pain and denies shortness of breath aside from when she does truly exertional things which she almost never does.  Her blood pressure at home is been under good control.  Her last cholesterol profile in October was good.  She did have an episode of atrial fibrillation in October that lasted 5 hours and 41 minutes but she was unaware of that.  She cannot tell when she is in atrial fibrillation.  She denies lower extremity edema.      The following portions of the patient's history were reviewed and updated as appropriate: allergies, current medications and problem list.    Pertinent positives as listed in the HPI.  All other systems reviewed are negative.         Vitals:    04/22/20 1017   BP: 130/93   Pulse: 67   Weight: 77.1 kg (170 lb)   Height: 152.4 cm (60\")       Physical Exam:      Oriented to time place and person   Recent and remote memory is intact  Hearing is normal  Respiratory effort is normal  Judgment and insight is normal  Appropriate mood and affect   "       Diagnostic Data (reviewed with patient):    10/09/2019  Lipid panel:      HDL 46  LDL 61    Procedures     Pacemaker interrogation 4/21/2020 personally reviewed from the download: P wave 2.7 mV with an impedance of 698 ohms and a threshold of 1.3 V at 0.4 ms.  Ventricular lead R wave 19.5 mV with a an impedance of 850 ohms and a pacing threshold 1.2 V at 0.4 ms.  A total of 12.6 hours of atrial fibrillation with less than 1% mode switching.  Longest episode was 5 hours and 41 minutes on 10/27/2019.  No changes were made.      Assessment:    ICD-10-CM ICD-9-CM   1. Coronary artery disease involving native coronary artery of native heart without angina pectoris I25.10 414.01   2. Essential hypertension I10 401.9   3. Dyslipidemia E78.5 272.4   4. Sinus node dysfunction (CMS/[facility]) I49.5 427.81         Plan:  1. Continue propranolol for rate control.  2.   3. Continue Lipitor for hyperlipidemia and coronary disease  4. Continue Eliquis for paroxysmal atrial fibrillation  5. Continue aspirin for coronary disease  6. Continue metolazone and spironolactone for lower extremity edema  7. Continue all other current medications.  8. F/up in 6 months with a barcoo check, sooner if needed.      This patient has consented to a telehealth visit via telephone. The visit was scheduled as a follow-up visit to comply with patient safety concerns in accordance with CDC recommendations.  All vitals recorded within this visit are reported by the patient.  I spent 15 minutes in total including but not limited to the 7 minutes spent in direct conversation with this patient.     Tracy Diego MD, [facility]

## 2020-04-22 ENCOUNTER — OFFICE VISIT (OUTPATIENT)
Dept: CARDIOLOGY | Facility: CLINIC | Age: 85
End: 2020-04-22

## 2020-04-22 VITALS
SYSTOLIC BLOOD PRESSURE: 130 MMHG | HEART RATE: 67 BPM | HEIGHT: 60 IN | WEIGHT: 170 LBS | DIASTOLIC BLOOD PRESSURE: 93 MMHG | BODY MASS INDEX: 33.38 KG/M2

## 2020-04-22 DIAGNOSIS — I49.5 SINUS NODE DYSFUNCTION (HCC): ICD-10-CM

## 2020-04-22 DIAGNOSIS — I25.10 CORONARY ARTERY DISEASE INVOLVING NATIVE CORONARY ARTERY OF NATIVE HEART WITHOUT ANGINA PECTORIS: Primary | Chronic | ICD-10-CM

## 2020-04-22 DIAGNOSIS — I10 ESSENTIAL HYPERTENSION: ICD-10-CM

## 2020-04-22 DIAGNOSIS — E78.5 DYSLIPIDEMIA: ICD-10-CM

## 2020-04-22 PROCEDURE — 99441 PR PHYS/QHP TELEPHONE EVALUATION 5-10 MIN: CPT | Performed by: INTERNAL MEDICINE

## 2020-04-27 RX ORDER — METOLAZONE 2.5 MG/1
2.5 TABLET ORAL DAILY
Qty: 90 TABLET | Refills: 2 | Status: SHIPPED | OUTPATIENT
Start: 2020-04-27

## 2020-05-18 ENCOUNTER — TELEPHONE (OUTPATIENT)
Dept: GASTROENTEROLOGY | Facility: CLINIC | Age: 85
End: 2020-05-18

## 2020-05-19 RX ORDER — DICYCLOMINE HYDROCHLORIDE 10 MG/1
10 CAPSULE ORAL 3 TIMES DAILY PRN
Qty: 180 CAPSULE | Refills: 3 | Status: SHIPPED | OUTPATIENT
Start: 2020-05-19 | End: 2020-09-09

## 2020-05-21 ENCOUNTER — OFFICE VISIT (OUTPATIENT)
Dept: GASTROENTEROLOGY | Facility: CLINIC | Age: 85
End: 2020-05-21

## 2020-05-21 DIAGNOSIS — K58.2 IRRITABLE BOWEL SYNDROME WITH BOTH CONSTIPATION AND DIARRHEA: Primary | ICD-10-CM

## 2020-05-21 PROCEDURE — 99214 OFFICE O/P EST MOD 30 MIN: CPT | Performed by: INTERNAL MEDICINE

## 2020-05-21 NOTE — PROGRESS NOTES
PCP: Sreekanth Shi APRN    No chief complaint on file.    Cc: urgent stool and abdominal pain    History of Present Illness:   Iliana Oleary is a 86 y.o. female who presents to GI clinic as a follow up for ibs.  She has had a recent flare of urgent liquid stool and achy intermittent abdominal pain. The pain is achy and relieved with bm after a few minutes. No gib loss. Bentyl helps.    Past Medical History:   Diagnosis Date   • Anxiety    • Arthritis    • Constipation    • Coronary artery disease    • Depression    • Dyslipidemia    • First degree AV block 10/8/2018    Added automatically from request for surgery [id number]   • Frequent falls    • Hepatitis     PT SAYS SOMEONE TOLD HER YRS AGO THAT SHE HAD HEPATITIS BUT UNSURE WHAT KIND   • Hypertension    • IBS (irritable bowel syndrome)    • Irregular heart beat    • Ischemic heart disease    • Morbid obesity (CMS/HCC)     ; BMI > 40.     • Myocardial infarction (CMS/HCC)    • Osteoarthritis    • Panic attack    • PFO (patent foramen ovale)     PFO versus small ASD (echocardiogram on 05/06/2011) - asymptomatic.     • Sinus node dysfunction (CMS/HCC) 3/14/2019   • Staph infection     R FLANK   • Wears glasses    • Wears partial dentures     UPPER       Past Surgical History:   Procedure Laterality Date   • ANAL FISSURECTOMY/FISTULECTOMY  10/2002   • ANAL FISTULA REPAIR     • ANKLE SURGERY Left 1988   • ANKLE SURGERY Left 11/2001    ANKLE SCREW AND BONE CHIP REMOVAL    • CARDIAC CATHETERIZATION      with stents, placed over five years ago per patient.    • CARDIAC ELECTROPHYSIOLOGY PROCEDURE N/A 10/15/2018    Procedure: Pacemaker DC new;  Surgeon: Tracy Diego MD;  Location:  [facility];  Service: Cardiology   • CATARACT EXTRACTION     • CHOLECYSTECTOMY  05/2000   • COLONOSCOPY      LESS THAN 5 YRS AGO PER PT   • COLONOSCOPY N/A 1/29/2020    Procedure: COLONOSCOPY;  Surgeon: Ke Graves MD;  Location:  [facility];   Service: Gastroenterology   • ENDOSCOPY N/A 1/29/2020    Procedure: ESOPHAGOGASTRODUODENOSCOPY;  Surgeon: Ke Graves MD;  Location: [facility] ENDOSCOPY;  Service: Gastroenterology   • FOOT SURGERY  1988    RIGHT HEEL SPUR    • HEMORRHOIDECTOMY  1996   • KNEE ARTHROPLASTY Bilateral     LEFT 2007, RIGHT YR UNKNOWN   • OTHER SURGICAL HISTORY Right 05/2005    Flank abcess (staph)         Current Outpatient Medications:   •  aspirin 81 MG EC tablet, Take 81 mg by mouth Every Morning., Disp: , Rfl:   •  atorvastatin (LIPITOR) 40 MG tablet, Take 40 mg by mouth Every Night., Disp: , Rfl:   •  busPIRone (BUSPAR) 15 MG tablet, Take 15 mg by mouth 3 (Three) Times a Day., Disp: , Rfl:   •  dicyclomine (Bentyl) 10 MG capsule, Take 1 capsule by mouth 3 (Three) Times a Day As Needed (abdominal pain)., Disp: 180 capsule, Rfl: 3  •  ELIQUIS 5 MG tablet tablet, TAKE 1 TABLET BY MOUTH EVERY 12 HOURS. RESUME MEDICATION ON 10/18/18 (Patient taking differently: 5 mg Every 12 (Twelve) Hours.), Disp: 180 tablet, Rfl: 3  •  escitalopram (LEXAPRO) 20 MG tablet, Take 20 mg by mouth Every Morning., Disp: , Rfl:   •  gabapentin (NEURONTIN) 300 MG capsule, Take 300 mg by mouth 3 (Three) Times a Day., Disp: , Rfl:   •  lansoprazole (PREVACID) 30 MG capsule, Take 30 mg by mouth daily., Disp: , Rfl:   •  metOLazone (ZAROXOLYN) 2.5 MG tablet, TAKE 1 TABLET BY MOUTH DAILY, Disp: 90 tablet, Rfl: 2  •  Multiple Vitamins-Minerals (MULTIVITAMIN PO), Take 1 tablet by mouth daily., Disp: , Rfl:   •  O2 (OXYGEN), Inhale 3 L/min Daily. Per patient uses at night, Disp: , Rfl:   •  oxybutynin XL (DITROPAN-XL) 10 MG 24 hr tablet, 10 mg Daily., Disp: , Rfl: 2  •  propranolol (INDERAL) 20 MG tablet, Take 20 mg by mouth Daily., Disp: , Rfl:   •  spironolactone (ALDACTONE) 50 MG tablet, TAKE 1 TABLET BY MOUTH DAILY, Disp: 90 tablet, Rfl: 0  •  traMADol (ULTRAM) 50 MG tablet, Take 50 mg by mouth Every 6 (Six) Hours As Needed. Takes 2 tablets q 6 hours, Disp:  , Rfl: 0    Allergies   Allergen Reactions   • Chlorhexidine Rash   • Codeine Nausea And Vomiting     N/V   • Morphine And Related Nausea And Vomiting     N/V   • Oxycodone Rash       Family History   Problem Relation Age of Onset   • Breast cancer Neg Hx    • Ovarian cancer Neg Hx        Social History     Socioeconomic History   • Marital status:      Spouse name: Not on file   • Number of children: Not on file   • Years of education: Not on file   • Highest education level: Not on file   Tobacco Use   • Smoking status: Never Smoker   • Smokeless tobacco: Never Used   Substance and Sexual Activity   • Alcohol use: No   • Drug use: No   • Sexual activity: Defer   Social History Narrative    Patient lives at home with spouse.       Review of Systems   A complete 12 point ros was asked and is negative except for that mentioned above.  In particular:  No fever  No rash  No increased arthralgias  No worsening edema  No cough  No dyspnea  No chest pain        There were no vitals filed for this visit.    Physical Exam  Unable to perform    Assessment/Plan  1.) IBS-M  Workup: colonoscopy 2020  Take bentyl once a day in the morning and then as needed. She is aware that she tends to revert to constipation and she can increase the medicine but not to exceed 4 a day.  She will call in a week if no better.    rtc 1 year.      Ke Graves MD  5/21/2020

## 2020-08-23 NOTE — ANESTHESIA POSTPROCEDURE EVALUATION
Patient: Iliana Oleary    Procedure Summary     Date:  01/29/20 Room / Location:   [facility] /  [facility]    Anesthesia Start:  1250 Anesthesia Stop:      Procedures:       COLONOSCOPY (N/A )      ESOPHAGOGASTRODUODENOSCOPY (N/A ) Diagnosis:       Abdominal pain, unspecified abdominal location      Diarrhea, unspecified type      Constipation, unspecified constipation type      (Abdominal pain, unspecified abdominal location [R10.9])      (Diarrhea, unspecified type [R19.7])      (Constipation, unspecified constipation type [K59.00])    Surgeon:  Ke Graves MD Provider:  Vance Blakely MD    Anesthesia Type:  general ASA Status:  3          Anesthesia Type: general    Vitals  No vitals data found for the desired time range.          Post Anesthesia Care and Evaluation    Patient location during evaluation: PACU  Patient participation: complete - patient participated  Level of consciousness: awake and alert  Pain score: 0  Pain management: adequate  Airway patency: patent  Anesthetic complications: No anesthetic complications  PONV Status: none  Cardiovascular status: hemodynamically stable and acceptable  Respiratory status: nonlabored ventilation, acceptable and nasal cannula  Hydration status: acceptable       The patient is a 28y Female complaining of pregnancy problem.

## 2020-09-09 ENCOUNTER — TELEPHONE (OUTPATIENT)
Dept: GASTROENTEROLOGY | Facility: CLINIC | Age: 85
End: 2020-09-09

## 2020-09-09 DIAGNOSIS — K58.2 IRRITABLE BOWEL SYNDROME WITH BOTH CONSTIPATION AND DIARRHEA: Primary | ICD-10-CM

## 2020-09-09 RX ORDER — DICYCLOMINE HYDROCHLORIDE 10 MG/1
CAPSULE ORAL
Qty: 90 CAPSULE | Refills: 1 | Status: SHIPPED | OUTPATIENT
Start: 2020-09-09 | End: 2020-09-10 | Stop reason: ALTCHOICE

## 2020-09-09 NOTE — TELEPHONE ENCOUNTER
Got the message.     Lets try bentyl 20 mg  20 mg po q 6 hours as needed for bowel spasms  Qs : 90  1 refill

## 2020-09-09 NOTE — TELEPHONE ENCOUNTER
Daughter called to inform Dr. Graves that Ms. Oleary is still having ongoing diarrhea with accident multiple times a day, even with using depends, her 90 year old dad is still having to clean up these messes and seems the dicyclomine is not working for her and has not been since original RX.She has requested for Dr. Graves to review the dosage and possible up the dosage to get full effects from the medication.   I advised I would route this information to dr. Graves and would return her call as soon as I get a reply from Dr. Graves. Understanding was voiced with no further questions at this time.

## 2020-09-10 ENCOUNTER — TELEPHONE (OUTPATIENT)
Dept: GASTROENTEROLOGY | Facility: CLINIC | Age: 85
End: 2020-09-10

## 2020-09-10 DIAGNOSIS — K58.2 IRRITABLE BOWEL SYNDROME WITH BOTH CONSTIPATION AND DIARRHEA: Primary | ICD-10-CM

## 2020-09-10 RX ORDER — DICYCLOMINE HCL 20 MG
20 TABLET ORAL EVERY 6 HOURS
Qty: 90 TABLET | Refills: 1 | Status: SHIPPED | OUTPATIENT
Start: 2020-09-10 | End: 2020-10-05 | Stop reason: SDUPTHER

## 2020-09-10 NOTE — TELEPHONE ENCOUNTER
Carlie( pt's daughter) called back. Informed her that Dr Graves sent Bentyl 20 mg tablets to preferred pharmacy.  Take 20 mg  by mouth every 6 hours as needed for bowel spasms per Dr Graves.

## 2020-09-10 NOTE — TELEPHONE ENCOUNTER
20mg dicyclomine only is available in a tablet; per pharmacy, resend rx for tablet not capsule. rx resent 9/10/2020

## 2020-10-05 DIAGNOSIS — K58.2 IRRITABLE BOWEL SYNDROME WITH BOTH CONSTIPATION AND DIARRHEA: ICD-10-CM

## 2020-10-05 RX ORDER — DICYCLOMINE HCL 20 MG
20 TABLET ORAL EVERY 6 HOURS
Qty: 90 TABLET | Refills: 1 | Status: SHIPPED | OUTPATIENT
Start: 2020-10-05

## 2021-01-01 ENCOUNTER — DOCUMENTATION (OUTPATIENT)
Dept: NEUROLOGY | Facility: CLINIC | Age: 86
End: 2021-01-01

## 2021-01-01 ENCOUNTER — TELEMEDICINE (OUTPATIENT)
Dept: NEUROLOGY | Facility: CLINIC | Age: 86
End: 2021-01-01

## 2021-01-01 ENCOUNTER — TELEPHONE (OUTPATIENT)
Dept: NEUROLOGY | Facility: CLINIC | Age: 86
End: 2021-01-01

## 2021-01-01 DIAGNOSIS — F02.80 SDAT (SENILE DEMENTIA OF ALZHEIMER'S TYPE) (HCC): Chronic | ICD-10-CM

## 2021-01-01 DIAGNOSIS — F33.1 MODERATE RECURRENT MAJOR DEPRESSION (HCC): Chronic | ICD-10-CM

## 2021-01-01 DIAGNOSIS — G30.1 SDAT (SENILE DEMENTIA OF ALZHEIMER'S TYPE) (HCC): Chronic | ICD-10-CM

## 2021-01-01 DIAGNOSIS — R41.3 MEMORY LOSS: Primary | ICD-10-CM

## 2021-01-01 PROCEDURE — 93296 REM INTERROG EVL PM/IDS: CPT | Performed by: INTERNAL MEDICINE

## 2021-01-01 PROCEDURE — 99214 OFFICE O/P EST MOD 30 MIN: CPT | Performed by: NURSE PRACTITIONER

## 2021-01-01 PROCEDURE — 93294 REM INTERROG EVL PM/LDLS PM: CPT | Performed by: INTERNAL MEDICINE

## 2021-01-01 RX ORDER — MEMANTINE HYDROCHLORIDE 10 MG/1
TABLET ORAL
Qty: 60 TABLET | Refills: 6 | Status: SHIPPED | OUTPATIENT
Start: 2021-01-01 | End: 2022-01-01

## 2021-02-03 ENCOUNTER — TRANSCRIBE ORDERS (OUTPATIENT)
Dept: ADMINISTRATIVE | Facility: HOSPITAL | Age: 86
End: 2021-02-03

## 2021-02-05 ENCOUNTER — TRANSCRIBE ORDERS (OUTPATIENT)
Dept: ADMINISTRATIVE | Facility: HOSPITAL | Age: 86
End: 2021-02-05

## 2021-02-05 DIAGNOSIS — R41.3 MEMORY LOSS OR IMPAIRMENT: Primary | ICD-10-CM

## 2021-02-13 ENCOUNTER — HOSPITAL ENCOUNTER (EMERGENCY)
Facility: HOSPITAL | Age: 86
Discharge: HOME | End: 2021-02-13
Attending: EMERGENCY MEDICINE | Admitting: EMERGENCY MEDICINE

## 2021-02-13 VITALS
OXYGEN SATURATION: 97 % | RESPIRATION RATE: 18 BRPM | DIASTOLIC BLOOD PRESSURE: 81 MMHG | WEIGHT: 168 LBS | HEART RATE: 65 BPM | HEIGHT: 60 IN | BODY MASS INDEX: 32.98 KG/M2 | SYSTOLIC BLOOD PRESSURE: 178 MMHG | TEMPERATURE: 98.8 F

## 2021-02-13 DIAGNOSIS — F03.90 DEMENTIA WITHOUT BEHAVIORAL DISTURBANCE, UNSPECIFIED DEMENTIA TYPE: ICD-10-CM

## 2021-02-13 DIAGNOSIS — R30.0 DYSURIA: Primary | ICD-10-CM

## 2021-02-13 LAB
ANION GAP SERPL CALCULATED.3IONS-SCNC: 9 MMOL/L (ref 5–15)
BACTERIA UR QL AUTO: NORMAL /HPF
BASOPHILS # BLD AUTO: 0.09 10*3/MM3 (ref 0–0.2)
BASOPHILS NFR BLD AUTO: 0.9 % (ref 0–1.5)
BILIRUB UR QL STRIP: NEGATIVE
BUN SERPL-MCNC: 26 MG/DL (ref 8–23)
BUN/CREAT SERPL: 21.5 (ref 7–25)
CALCIUM SPEC-SCNC: 9.6 MG/DL (ref 8.6–10.5)
CHLORIDE SERPL-SCNC: 100 MMOL/L (ref 98–107)
CLARITY UR: CLEAR
CO2 SERPL-SCNC: 26 MMOL/L (ref 22–29)
COLOR UR: YELLOW
CREAT SERPL-MCNC: 1.21 MG/DL (ref 0.57–1)
DEPRECATED RDW RBC AUTO: 42.2 FL (ref 37–54)
EOSINOPHIL # BLD AUTO: 0.43 10*3/MM3 (ref 0–0.4)
EOSINOPHIL NFR BLD AUTO: 4.1 % (ref 0.3–6.2)
ERYTHROCYTE [DISTWIDTH] IN BLOOD BY AUTOMATED COUNT: 13.3 % (ref 12.3–15.4)
GFR SERPL CREATININE-BSD FRML MDRD: 42 ML/MIN/1.73
GLUCOSE SERPL-MCNC: 114 MG/DL (ref 65–99)
GLUCOSE UR STRIP-MCNC: NEGATIVE MG/DL
HCT VFR BLD AUTO: 41.2 % (ref 34–46.6)
HGB BLD-MCNC: 13.4 G/DL (ref 12–15.9)
HGB UR QL STRIP.AUTO: ABNORMAL
HOLD SPECIMEN: NORMAL
HYALINE CASTS UR QL AUTO: NORMAL /LPF
IMM GRANULOCYTES # BLD AUTO: 0.05 10*3/MM3 (ref 0–0.05)
IMM GRANULOCYTES NFR BLD AUTO: 0.5 % (ref 0–0.5)
KETONES UR QL STRIP: NEGATIVE
LEUKOCYTE ESTERASE UR QL STRIP.AUTO: ABNORMAL
LYMPHOCYTES # BLD AUTO: 1.72 10*3/MM3 (ref 0.7–3.1)
LYMPHOCYTES NFR BLD AUTO: 16.4 % (ref 19.6–45.3)
MCH RBC QN AUTO: 30.2 PG (ref 26.6–33)
MCHC RBC AUTO-ENTMCNC: 32.5 G/DL (ref 31.5–35.7)
MCV RBC AUTO: 92.8 FL (ref 79–97)
MONOCYTES # BLD AUTO: 0.94 10*3/MM3 (ref 0.1–0.9)
MONOCYTES NFR BLD AUTO: 9 % (ref 5–12)
NEUTROPHILS NFR BLD AUTO: 69.1 % (ref 42.7–76)
NEUTROPHILS NFR BLD AUTO: 7.24 10*3/MM3 (ref 1.7–7)
NITRITE UR QL STRIP: NEGATIVE
NRBC BLD AUTO-RTO: 0 /100 WBC (ref 0–0.2)
PH UR STRIP.AUTO: 5.5 [PH] (ref 5–8)
PLATELET # BLD AUTO: 249 10*3/MM3 (ref 140–450)
PMV BLD AUTO: 10.8 FL (ref 6–12)
POTASSIUM SERPL-SCNC: 5.4 MMOL/L (ref 3.5–5.2)
PROT UR QL STRIP: NEGATIVE
RBC # BLD AUTO: 4.44 10*6/MM3 (ref 3.77–5.28)
RBC # UR: NORMAL /HPF
REF LAB TEST METHOD: NORMAL
SODIUM SERPL-SCNC: 135 MMOL/L (ref 136–145)
SP GR UR STRIP: 1.01 (ref 1–1.03)
SQUAMOUS #/AREA URNS HPF: NORMAL /HPF
UROBILINOGEN UR QL STRIP: ABNORMAL
WBC # BLD AUTO: 10.47 10*3/MM3 (ref 3.4–10.8)
WBC UR QL AUTO: NORMAL /HPF
WHOLE BLOOD HOLD SPECIMEN: NORMAL
WHOLE BLOOD HOLD SPECIMEN: NORMAL

## 2021-02-13 PROCEDURE — 99283 EMERGENCY DEPT VISIT LOW MDM: CPT

## 2021-02-13 PROCEDURE — 81001 URINALYSIS AUTO W/SCOPE: CPT | Performed by: PHYSICIAN ASSISTANT

## 2021-02-13 PROCEDURE — 87086 URINE CULTURE/COLONY COUNT: CPT | Performed by: PHYSICIAN ASSISTANT

## 2021-02-13 PROCEDURE — 85025 COMPLETE CBC W/AUTO DIFF WBC: CPT | Performed by: PHYSICIAN ASSISTANT

## 2021-02-13 PROCEDURE — 80048 BASIC METABOLIC PNL TOTAL CA: CPT | Performed by: PHYSICIAN ASSISTANT
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
## 2021-02-14 NOTE — DISCHARGE INSTRUCTIONS
Your urine has been cultured and you will be advised if there is any need to restart your antibiotics.  Follow-up with your primary care provider on Monday.  Return to the emergency department immediately if any change or worsening of symptoms.

## 2021-02-15 LAB — BACTERIA SPEC AEROBE CULT: NO GROWTH

## 2021-02-21 PROCEDURE — 93294 REM INTERROG EVL PM/LDLS PM: CPT | Performed by: INTERNAL MEDICINE

## 2021-02-21 PROCEDURE — 93296 REM INTERROG EVL PM/IDS: CPT | Performed by: INTERNAL MEDICINE

## 2021-03-01 ENCOUNTER — HOSPITAL ENCOUNTER (OUTPATIENT)
Dept: CT IMAGING | Facility: HOSPITAL | Age: 86
Discharge: HOME | End: 2021-03-01
Admitting: NURSE PRACTITIONER

## 2021-03-01 DIAGNOSIS — R41.3 MEMORY LOSS OR IMPAIRMENT: ICD-10-CM

## 2021-03-01 PROCEDURE — 70470 CT HEAD/BRAIN W/O & W/DYE: CPT

## 2021-03-01 PROCEDURE — 0 IOPAMIDOL PER 1 ML: Performed by: NURSE PRACTITIONER

## 2021-03-01 RX ADMIN — IOPAMIDOL 50 ML: 755 INJECTION, SOLUTION INTRAVENOUS at 14:45

## 2021-04-05 ENCOUNTER — OFFICE VISIT (OUTPATIENT)
Dept: NEUROLOGY | Facility: CLINIC | Age: 86
End: 2021-04-05

## 2021-04-05 VITALS
OXYGEN SATURATION: 92 % | HEART RATE: 64 BPM | HEIGHT: 60 IN | DIASTOLIC BLOOD PRESSURE: 82 MMHG | SYSTOLIC BLOOD PRESSURE: 132 MMHG | BODY MASS INDEX: 33.38 KG/M2 | WEIGHT: 170 LBS

## 2021-04-05 DIAGNOSIS — F02.80 SDAT (SENILE DEMENTIA OF ALZHEIMER'S TYPE) (HCC): Primary | ICD-10-CM

## 2021-04-05 DIAGNOSIS — G30.1 SDAT (SENILE DEMENTIA OF ALZHEIMER'S TYPE) (HCC): Primary | ICD-10-CM

## 2021-04-05 PROBLEM — R41.3 MEMORY LOSS: Status: ACTIVE | Noted: 2021-04-05

## 2021-04-05 PROCEDURE — 99204 OFFICE O/P NEW MOD 45 MIN: CPT | Performed by: PSYCHIATRY & NEUROLOGY

## 2021-04-05 RX ORDER — MEMANTINE HYDROCHLORIDE 5 MG-10 MG
KIT ORAL
Qty: 1 TABLET | Refills: 0 | Status: SHIPPED | OUTPATIENT
Start: 2021-04-05 | End: 2021-06-01

## 2021-04-05 RX ORDER — GABAPENTIN 400 MG/1
400 CAPSULE ORAL 3 TIMES DAILY
COMMUNITY
Start: 2021-02-25

## 2021-04-05 RX ORDER — OXYCODONE HYDROCHLORIDE 5 MG/1
5 TABLET ORAL EVERY 8 HOURS
COMMUNITY
Start: 2021-02-25

## 2021-04-05 RX ORDER — MEMANTINE HYDROCHLORIDE 10 MG/1
10 TABLET ORAL 2 TIMES DAILY
Qty: 60 TABLET | Refills: 6 | Status: SHIPPED | OUTPATIENT
Start: 2021-04-05 | End: 2021-06-01 | Stop reason: SDUPTHER

## 2021-04-05 RX ORDER — DONEPEZIL HYDROCHLORIDE 10 MG/1
10 TABLET, FILM COATED ORAL DAILY
COMMUNITY
Start: 2021-02-03 | End: 2021-06-01 | Stop reason: SDUPTHER

## 2021-04-05 NOTE — PROGRESS NOTES
"Chief Complaint  Memory Loss (NP)    Subjective          Iliana Oleary presents to [facility]UR[facility] for memory loss.    History of Present Illness    87 y.o. female referred by Sreekanth Shi.      MMSE 24/30    Sx started 2019.  Trouble remembering family members.  Forgetting dates and times.  Confused as to where she is living.      Started on Donepezil 2/3/21.  Small improvement.     Can use a  TV remote, microwave.     is preparing meals.      Able to dress and bath herself.     Lost 30 lbs in last year.      Forgetting she has eaten.         Sleeping all day long.      Reviewed medical records:    C/O tremors.  Taking Bentyl TID, oxycodone TID, GBP TID, propranolol.      Labs CBC, CMP, A1C, Vit D - ncs    Objective   Vital Signs:   /82   Pulse 64   Ht 152.4 cm (60\")   Wt 77.1 kg (170 lb)   SpO2 92%   BMI 33.20 kg/m²       Physical Exam  Eyes:      Extraocular Movements: EOM normal.      Pupils: Pupils are equal, round, and reactive to light.   Neurological:      Mental Status: She is oriented to person, place, and time.      Coordination: Finger-Nose-Finger Test normal.      Deep Tendon Reflexes: Strength normal.   Psychiatric:         Speech: Speech normal.          Neurologic Exam     Mental Status   Oriented to person, place, and time.   Oriented to person.   Oriented to place. Disoriented to area. Oriented to country and city.   Attention: decreased. Concentration: decreased.   Speech: speech is normal   Level of consciousness: alert  Knowledge: good and consistent with education.   Abnormal comprehension.     Cranial Nerves     CN II   Visual fields full to confrontation.   Visual acuity: normal  Right visual field deficit: none  Left visual field deficit: none     CN III, IV, VI   Pupils are equal, round, and reactive to light.  Extraocular motions are normal.   Nystagmus: none   Diplopia: none  Ophthalmoparesis: none  Upgaze: normal  Downgaze: normal  Conjugate " gaze: present    CN V   Facial sensation intact.   Right corneal reflex: normal  Left corneal reflex: normal    CN VII   Right facial weakness: none  Left facial weakness: none    CN VIII   Hearing: intact    CN IX, X   Palate: symmetric  Right gag reflex: normal  Left gag reflex: normal    CN XI   Right sternocleidomastoid strength: normal  Left sternocleidomastoid strength: normal    CN XII   Tongue: not atrophic  Fasciculations: absent  Tongue deviation: none    Motor Exam   Muscle bulk: normal  Overall muscle tone: normal  Right arm tone: normal  Left arm tone: normal  Right leg tone: normal  Left leg tone: normal    Strength   Strength 5/5 throughout.     Sensory Exam   Light touch normal.     Gait, Coordination, and Reflexes     Gait  Gait: non-neurologic    Coordination   Finger to nose coordination: normal    Tremor   Resting tremor: absent  Intention tremor: absent  Action tremor: absent    Reflexes   Reflexes 2+ except as noted.      Result Review :   The following data was reviewed by: Dhiraj Bobby MD on 04/05/2021:  Common labs    Common Labsle 2/13/21 2/13/21    1641 1641   Glucose  114 (A)   BUN  26 (A)   Creatinine  1.21 (A)   eGFR Non African Am  42 (A)   Sodium  135 (A)   Potassium  5.4 (A)   Chloride  100   Calcium  9.6   WBC 10.47    Hemoglobin 13.4    Hematocrit 41.2    Platelets 249    (A) Abnormal value            Data reviewed: Radiologic studies HCT, my review of films, atrophy, svdz          Assessment and Plan    Diagnoses and all orders for this visit:    1. SDAT (senile dementia of Alzheimer's type) (CMS/[facility]) (Primary)  Assessment & Plan:  Signs and symptoms of SDAT     Continue Aricept 10 mg daily      Other orders  -     memantine (Namenda Titration Rob) 28 x 5 MG & 21 x 10 MG tablet pack; Follow package directions.  Dispense: 1 tablet; Refill: 0  -     memantine (Namenda) 10 MG tablet; Take 1 tablet by mouth 2 (Two) Times a Day.  Dispense: 60 tablet; Refill: 6      Follow Up   No  follow-ups on file.  Patient was given instructions and counseling regarding her condition or for health maintenance advice. Please see specific information pulled into the AVS if appropriate.

## 2021-04-28 ENCOUNTER — TELEPHONE (OUTPATIENT)
Dept: NEUROLOGY | Facility: CLINIC | Age: 86
End: 2021-04-28

## 2021-04-28 NOTE — TELEPHONE ENCOUNTER
Called daughter, Carlie back and let her know when  sent in the Namenda titration elier, he had also sent in the regular Namenda script with 6 refills on it. She stated understanding and will call the pharmacy so they can get this ready/let me know if she has any issues picking it up. Thanks!

## 2021-04-28 NOTE — TELEPHONE ENCOUNTER
Caller: CHALINO ROACH     Relationship: DAUGHTER     Best call back number: [phone]    What medications are you currently taking:   Current Outpatient Medications on File Prior to Visit   Medication Sig Dispense Refill   • aspirin 81 MG EC tablet Take 81 mg by mouth Every Morning.     • atorvastatin (LIPITOR) 40 MG tablet Take 40 mg by mouth Every Night.     • busPIRone (BUSPAR) 15 MG tablet Take 15 mg by mouth 3 (Three) Times a Day.     • dicyclomine (Bentyl) 20 MG tablet Take 1 tablet by mouth Every 6 (Six) Hours. Indications: Irritable Bowel Syndrome 90 tablet 1   • donepezil (ARICEPT) 10 MG tablet Take 10 mg by mouth Daily.     • ELIQUIS 5 MG tablet tablet TAKE 1 TABLET BY MOUTH EVERY 12 HOURS. RESUME MEDICATION ON 10/18/18 (Patient taking differently: 5 mg Every 12 (Twelve) Hours.) 180 tablet 3   • escitalopram (LEXAPRO) 20 MG tablet Take 20 mg by mouth Every Morning.     • gabapentin (NEURONTIN) 400 MG capsule Take 400 mg by mouth 3 (Three) Times a Day.     • lansoprazole (PREVACID) 30 MG capsule Take 30 mg by mouth daily.     • memantine (Namenda Titration Rob) 28 x 5 MG & 21 x 10 MG tablet pack Follow package directions. 1 tablet 0   • memantine (Namenda) 10 MG tablet Take 1 tablet by mouth 2 (Two) Times a Day. 60 tablet 6   • metOLazone (ZAROXOLYN) 2.5 MG tablet TAKE 1 TABLET BY MOUTH DAILY 90 tablet 2   • Multiple Vitamins-Minerals (MULTIVITAMIN PO) Take 1 tablet by mouth daily.     • O2 (OXYGEN) Inhale 3 L/min Daily. Per patient uses at night     • oxybutynin XL (DITROPAN-XL) 10 MG 24 hr tablet 10 mg Daily.  2   • oxyCODONE (ROXICODONE) 5 MG immediate release tablet Take 5 mg by mouth Every 8 (Eight) Hours.     • propranolol (INDERAL) 20 MG tablet Take 20 mg by mouth Daily.     • spironolactone (ALDACTONE) 50 MG tablet TAKE 1 TABLET BY MOUTH DAILY 90 tablet 0     No current facility-administered medications on file prior to visit.        When did you start taking these medications: CLOSE TO 30  DAYS     Which medication are you concerned about: MARIA L     Who prescribed you this medication: DR. VARGAS     What are your concerns: CALLER STATES THAT THE PT HAS ONE WEEK LEFT OF THE NAMENDA AND WAS WANTING TO KNOW IF HE NEEDS TO CALL IN ANOTHER ORDER AND KEEP HER ON THEM BECAUSE THEY SEEM TO BE HELPING BECAUSE SHE IS NO WORSE.     How long have you been taking these medications: 30 DAYS     PLEASE ADVISE.

## 2021-05-23 PROCEDURE — 93296 REM INTERROG EVL PM/IDS: CPT | Performed by: INTERNAL MEDICINE

## 2021-05-23 PROCEDURE — 93294 REM INTERROG EVL PM/LDLS PM: CPT | Performed by: INTERNAL MEDICINE

## 2021-06-01 ENCOUNTER — OFFICE VISIT (OUTPATIENT)
Dept: NEUROLOGY | Facility: CLINIC | Age: 86
End: 2021-06-01

## 2021-06-01 ENCOUNTER — HOME HEALTH ADMISSION (OUTPATIENT)
Dept: HOME HEALTH SERVICES | Facility: HOME HEALTHCARE | Age: 86
End: 2021-06-01

## 2021-06-01 VITALS
HEIGHT: 60 IN | BODY MASS INDEX: 35.34 KG/M2 | WEIGHT: 180 LBS | HEART RATE: 72 BPM | SYSTOLIC BLOOD PRESSURE: 136 MMHG | DIASTOLIC BLOOD PRESSURE: 84 MMHG | OXYGEN SATURATION: 94 %

## 2021-06-01 DIAGNOSIS — R54 AGE-RELATED PHYSICAL DEBILITY: Chronic | ICD-10-CM

## 2021-06-01 DIAGNOSIS — G30.1 SDAT (SENILE DEMENTIA OF ALZHEIMER'S TYPE) (HCC): Chronic | ICD-10-CM

## 2021-06-01 DIAGNOSIS — F02.80 SDAT (SENILE DEMENTIA OF ALZHEIMER'S TYPE) (HCC): Chronic | ICD-10-CM

## 2021-06-01 PROCEDURE — 99214 OFFICE O/P EST MOD 30 MIN: CPT | Performed by: NURSE PRACTITIONER

## 2021-06-01 RX ORDER — MEMANTINE HYDROCHLORIDE 10 MG/1
10 TABLET ORAL 2 TIMES DAILY
Qty: 60 TABLET | Refills: 6 | Status: SHIPPED | OUTPATIENT
Start: 2021-06-01 | End: 2021-01-01

## 2021-06-01 RX ORDER — DONEPEZIL HYDROCHLORIDE 10 MG/1
10 TABLET, FILM COATED ORAL DAILY
Qty: 30 TABLET | Refills: 5 | Status: SHIPPED | OUTPATIENT
Start: 2021-06-01

## 2021-06-01 NOTE — ASSESSMENT & PLAN NOTE
Ordered Home health PT and SLP for physical conditioning, home assessment, and cognitive rehabilitation.

## 2021-06-01 NOTE — ASSESSMENT & PLAN NOTE
Psychological condition is improving with treatment.  Continue current treatment regimen.  Psychological condition  will be reassessed at the next regular appointment.    Continue Aricept 10 mg PO daily and Namenda 10 mg PO BID     Discussed future care planning and given memory care resource packet as well as SW information for assistance with care placement if needed. Daughter states she will not be able to care for patient due to her own physical ailments, and the patient and  will not be able to afford some inpatient facilities if she would need total care assistance. Discussed resources available and to discuss further with SW for specific insurance questions.

## 2021-06-02 ENCOUNTER — HOME CARE VISIT (OUTPATIENT)
Dept: HOME HEALTH SERVICES | Facility: HOME HEALTHCARE | Age: 86
End: 2021-06-02

## 2021-06-02 PROCEDURE — G0153 HHCP-SVS OF S/L PATH,EA 15MN: HCPCS

## 2021-06-03 VITALS
TEMPERATURE: 96.8 F | RESPIRATION RATE: 16 BRPM | HEART RATE: 62 BPM | WEIGHT: 178 LBS | HEIGHT: 60 IN | DIASTOLIC BLOOD PRESSURE: 70 MMHG | BODY MASS INDEX: 34.95 KG/M2 | OXYGEN SATURATION: 95 % | SYSTOLIC BLOOD PRESSURE: 112 MMHG

## 2021-06-04 ENCOUNTER — HOME CARE VISIT (OUTPATIENT)
Dept: HOME HEALTH SERVICES | Facility: HOME HEALTHCARE | Age: 86
End: 2021-06-04

## 2021-06-04 VITALS — HEART RATE: 68 BPM | OXYGEN SATURATION: 92 % | DIASTOLIC BLOOD PRESSURE: 68 MMHG | SYSTOLIC BLOOD PRESSURE: 112 MMHG

## 2021-06-04 PROCEDURE — G0151 HHCP-SERV OF PT,EA 15 MIN: HCPCS

## 2021-06-10 ENCOUNTER — HOME CARE VISIT (OUTPATIENT)
Dept: HOME HEALTH SERVICES | Facility: HOME HEALTHCARE | Age: 86
End: 2021-06-10

## 2021-06-10 VITALS
HEART RATE: 60 BPM | TEMPERATURE: 97.6 F | RESPIRATION RATE: 16 BRPM | OXYGEN SATURATION: 95 % | DIASTOLIC BLOOD PRESSURE: 70 MMHG | SYSTOLIC BLOOD PRESSURE: 122 MMHG

## 2021-06-10 PROCEDURE — G0153 HHCP-SVS OF S/L PATH,EA 15MN: HCPCS

## 2021-06-11 ENCOUNTER — HOME CARE VISIT (OUTPATIENT)
Dept: HOME HEALTH SERVICES | Facility: HOME HEALTHCARE | Age: 86
End: 2021-06-11

## 2021-06-14 ENCOUNTER — HOME CARE VISIT (OUTPATIENT)
Dept: HOME HEALTH SERVICES | Facility: HOME HEALTHCARE | Age: 86
End: 2021-06-14

## 2021-06-14 VITALS — DIASTOLIC BLOOD PRESSURE: 68 MMHG | OXYGEN SATURATION: 94 % | SYSTOLIC BLOOD PRESSURE: 122 MMHG

## 2021-06-14 PROCEDURE — G0151 HHCP-SERV OF PT,EA 15 MIN: HCPCS

## 2021-06-14 NOTE — HOME HEALTH
Patient seen for routine visit today .  canceled visit last week due to patient not feeling well. Today he said she is having a bad day both physically and mentally . she also has a headache .   no falls and edema minimal

## 2021-06-15 ENCOUNTER — HOME CARE VISIT (OUTPATIENT)
Dept: HOME HEALTH SERVICES | Facility: HOME HEALTHCARE | Age: 86
End: 2021-06-15

## 2021-06-15 VITALS
TEMPERATURE: 94.6 F | HEART RATE: 66 BPM | SYSTOLIC BLOOD PRESSURE: 112 MMHG | DIASTOLIC BLOOD PRESSURE: 64 MMHG | OXYGEN SATURATION: 95 % | RESPIRATION RATE: 16 BRPM

## 2021-06-15 PROCEDURE — G0153 HHCP-SVS OF S/L PATH,EA 15MN: HCPCS

## 2021-06-16 ENCOUNTER — HOME CARE VISIT (OUTPATIENT)
Dept: HOME HEALTH SERVICES | Facility: HOME HEALTHCARE | Age: 86
End: 2021-06-16

## 2021-06-16 PROCEDURE — G0151 HHCP-SERV OF PT,EA 15 MIN: HCPCS

## 2021-06-17 ENCOUNTER — HOME CARE VISIT (OUTPATIENT)
Dept: HOME HEALTH SERVICES | Facility: HOME HEALTHCARE | Age: 86
End: 2021-06-17

## 2021-06-17 VITALS — DIASTOLIC BLOOD PRESSURE: 58 MMHG | HEART RATE: 84 BPM | OXYGEN SATURATION: 94 % | SYSTOLIC BLOOD PRESSURE: 112 MMHG

## 2021-06-17 VITALS
HEART RATE: 60 BPM | SYSTOLIC BLOOD PRESSURE: 120 MMHG | DIASTOLIC BLOOD PRESSURE: 68 MMHG | TEMPERATURE: 95.9 F | OXYGEN SATURATION: 93 % | RESPIRATION RATE: 16 BRPM

## 2021-06-17 PROCEDURE — G0153 HHCP-SVS OF S/L PATH,EA 15MN: HCPCS

## 2021-06-17 NOTE — HOME HEALTH
Patient did well today . she said she was sore but she was able to increase reps of exercises and amb up and down 6 steps with cane and rail. She also performed the motor lift from 2nd to main floor with no help   Exercises were standing as previous and then progressed to side stepping and half squats   bp was 112/58  84 rhr and 94 o2 sats room air

## 2021-06-22 ENCOUNTER — HOME CARE VISIT (OUTPATIENT)
Dept: HOME HEALTH SERVICES | Facility: HOME HEALTHCARE | Age: 86
End: 2021-06-22

## 2021-06-22 VITALS
HEART RATE: 61 BPM | TEMPERATURE: 95.5 F | SYSTOLIC BLOOD PRESSURE: 118 MMHG | DIASTOLIC BLOOD PRESSURE: 62 MMHG | OXYGEN SATURATION: 97 %

## 2021-06-22 PROCEDURE — G0153 HHCP-SVS OF S/L PATH,EA 15MN: HCPCS

## 2021-06-23 ENCOUNTER — HOME CARE VISIT (OUTPATIENT)
Dept: HOME HEALTH SERVICES | Facility: HOME HEALTHCARE | Age: 86
End: 2021-06-23

## 2021-06-23 VITALS — HEART RATE: 71 BPM | SYSTOLIC BLOOD PRESSURE: 133 MMHG | OXYGEN SATURATION: 2 % | DIASTOLIC BLOOD PRESSURE: 67 MMHG

## 2021-06-23 PROCEDURE — G0151 HHCP-SERV OF PT,EA 15 MIN: HCPCS

## 2021-06-23 NOTE — HOME HEALTH
Reassessment     Patients orders complete. She has improved with her activities and is more compliant with her hep but still has issues with memory and this will not change   Her  is supportive and she is still homebound   She is doing well with amb and has performed up and down 6 steps one time with cane and rail sba   She amb on level 50 to 75 ft with rolling walker sba only   Bed mobility varies and is sba  to min assist        PT would like to continue to see patient one time week for 3 weeks to address goals and progress activitites.      New goals in 3 weeks are  Patient  to amb outside one time with appropriate device   Patient to be able to perform hep with only minimal verbal cues due to memory deficit   Patient and  education and safety complete

## 2021-06-24 ENCOUNTER — HOME CARE VISIT (OUTPATIENT)
Dept: HOME HEALTH SERVICES | Facility: HOME HEALTHCARE | Age: 86
End: 2021-06-24

## 2021-06-24 VITALS
SYSTOLIC BLOOD PRESSURE: 110 MMHG | DIASTOLIC BLOOD PRESSURE: 60 MMHG | RESPIRATION RATE: 16 BRPM | TEMPERATURE: 96.4 F | OXYGEN SATURATION: 94 % | HEART RATE: 64 BPM

## 2021-06-24 PROCEDURE — G0153 HHCP-SVS OF S/L PATH,EA 15MN: HCPCS

## 2021-06-30 ENCOUNTER — HOME CARE VISIT (OUTPATIENT)
Dept: HOME HEALTH SERVICES | Facility: HOME HEALTHCARE | Age: 86
End: 2021-06-30

## 2021-06-30 PROCEDURE — G0151 HHCP-SERV OF PT,EA 15 MIN: HCPCS

## 2021-07-01 ENCOUNTER — HOME CARE VISIT (OUTPATIENT)
Dept: HOME HEALTH SERVICES | Facility: HOME HEALTHCARE | Age: 86
End: 2021-07-01

## 2021-07-01 VITALS — HEART RATE: 66 BPM | SYSTOLIC BLOOD PRESSURE: 126 MMHG | OXYGEN SATURATION: 96 % | DIASTOLIC BLOOD PRESSURE: 66 MMHG

## 2021-07-01 VITALS — DIASTOLIC BLOOD PRESSURE: 72 MMHG | SYSTOLIC BLOOD PRESSURE: 126 MMHG | HEART RATE: 65 BPM | OXYGEN SATURATION: 90 %

## 2021-07-01 PROCEDURE — G0153 HHCP-SVS OF S/L PATH,EA 15MN: HCPCS

## 2021-07-06 ENCOUNTER — TELEPHONE (OUTPATIENT)
Dept: NEUROLOGY | Facility: CLINIC | Age: 86
End: 2021-07-06

## 2021-07-06 ENCOUNTER — HOME CARE VISIT (OUTPATIENT)
Dept: HOME HEALTH SERVICES | Facility: HOME HEALTHCARE | Age: 86
End: 2021-07-06

## 2021-07-06 VITALS
HEART RATE: 67 BPM | RESPIRATION RATE: 16 BRPM | OXYGEN SATURATION: 97 % | SYSTOLIC BLOOD PRESSURE: 112 MMHG | TEMPERATURE: 95.2 F | DIASTOLIC BLOOD PRESSURE: 62 MMHG

## 2021-07-06 PROCEDURE — G0153 HHCP-SVS OF S/L PATH,EA 15MN: HCPCS

## 2021-07-06 NOTE — TELEPHONE ENCOUNTER
Provider: PHOEBE SCOTT   Caller: CHALINO  Relationship to Patient: DAUGHTER   Phone Number:   Reason for Call: escitalopram (LEXAPRO) 20 MG tablet   THIS WAS PRESCRIBED BY A DIFFERENT PROVIDER. PTS DAUGHTER ASKED IF THERE IS ANOTHER ANTIDEPRESSANT THAT MAY WORK BETTER FOR THE PT. PTS DAUGHTER STATES THAT SHE WOULD LIKE TO SEE IF SOMETHING WOULD WORK BETTER FOR HER DUE TO HER MEMORY ISSUES.

## 2021-07-06 NOTE — TELEPHONE ENCOUNTER
I spoke with patient daughter. She is wanting to know if you know of any other antidepressants that would not make the ALZ meds any less effective

## 2021-07-07 ENCOUNTER — HOME CARE VISIT (OUTPATIENT)
Dept: HOME HEALTH SERVICES | Facility: HOME HEALTHCARE | Age: 86
End: 2021-07-07

## 2021-07-07 VITALS — OXYGEN SATURATION: 94 % | HEART RATE: 83 BPM | SYSTOLIC BLOOD PRESSURE: 134 MMHG | DIASTOLIC BLOOD PRESSURE: 78 MMHG

## 2021-07-07 PROCEDURE — G0151 HHCP-SERV OF PT,EA 15 MIN: HCPCS

## 2021-07-07 NOTE — HOME HEALTH
Patient seen for routine visit. She was having some bathroom issues and did not feel up to doing anything but after some discussion she agreed.   She amb slower than usual and she was shuffling less. Her transfers were no assist but supervision required.   Amb down 7 steps as previous times but but closer supervision   rail and cane   Perfromed her hep in sitting and standing but only one set of each. Added exercise of sitting and touching toes to stretch low back. She stated it felt better     Her busband is very supportive and watches her closely but he is 90 years old   No edema   No falls   Bp was up slightly and gradually decreased with activities.     170/90 initial and gradual decrease to 134/78    83 rhr and 94 o2 sats

## 2021-07-07 NOTE — TELEPHONE ENCOUNTER
I spoke with patient daughter, Carlie. I informed her that we'll have to schedule patient an appointment to assess because Qiana has never treated her for this. She said there is no she'll be able to get patient back and forth to appointment, any time soon. She has advised mew that she'll talk with her father (patient's ), and call me back.

## 2021-07-07 NOTE — TELEPHONE ENCOUNTER
Daughter is wanting to know if you will take over depression medication. Per Carlie, the Lexapro is not helping and her PCP feel it would be best if you monitor this. Please advise.

## 2021-07-08 PROCEDURE — G0180 MD CERTIFICATION HHA PATIENT: HCPCS | Performed by: NURSE PRACTITIONER

## 2021-07-15 ENCOUNTER — HOME CARE VISIT (OUTPATIENT)
Dept: HOME HEALTH SERVICES | Facility: HOME HEALTHCARE | Age: 86
End: 2021-07-15

## 2021-07-15 VITALS — HEART RATE: 78 BPM | OXYGEN SATURATION: 94 % | DIASTOLIC BLOOD PRESSURE: 73 MMHG | SYSTOLIC BLOOD PRESSURE: 110 MMHG

## 2021-07-15 VITALS
OXYGEN SATURATION: 92 % | DIASTOLIC BLOOD PRESSURE: 70 MMHG | SYSTOLIC BLOOD PRESSURE: 118 MMHG | TEMPERATURE: 96.9 F | HEART RATE: 72 BPM | RESPIRATION RATE: 18 BRPM

## 2021-07-15 PROCEDURE — G0151 HHCP-SERV OF PT,EA 15 MIN: HCPCS

## 2021-07-15 PROCEDURE — G0153 HHCP-SVS OF S/L PATH,EA 15MN: HCPCS

## 2021-07-15 NOTE — HOME HEALTH
Famt is discharged this day due to goals met and plateau. Patient is stable and has caregiver in home . Patient and caregiver in agreement with discharge

## 2021-07-19 ENCOUNTER — TELEMEDICINE (OUTPATIENT)
Dept: NEUROLOGY | Facility: CLINIC | Age: 86
End: 2021-07-19

## 2021-07-19 DIAGNOSIS — F33.1 MODERATE RECURRENT MAJOR DEPRESSION (HCC): Primary | ICD-10-CM

## 2021-07-19 DIAGNOSIS — R41.3 MEMORY LOSS: ICD-10-CM

## 2021-07-19 DIAGNOSIS — G30.1 SDAT (SENILE DEMENTIA OF ALZHEIMER'S TYPE) (HCC): ICD-10-CM

## 2021-07-19 DIAGNOSIS — F02.80 SDAT (SENILE DEMENTIA OF ALZHEIMER'S TYPE) (HCC): ICD-10-CM

## 2021-07-19 PROCEDURE — 99214 OFFICE O/P EST MOD 30 MIN: CPT | Performed by: NURSE PRACTITIONER

## 2021-07-19 RX ORDER — CITALOPRAM 10 MG/1
10 TABLET ORAL DAILY
Qty: 30 TABLET | Refills: 2 | Status: SHIPPED | OUTPATIENT
Start: 2021-07-19 | End: 2021-10-13

## 2021-07-19 NOTE — PROGRESS NOTES
"Subjective:     Patient ID: Iliana Oleary is a 87 y.o. female.    CC: No chief complaint on file.    This visit was conducted via video visit. The patient consented to receiving care via video visit prior to appointment.     HPI:   History of Present Illness   87 y.o. female returns in follow up for dementia. At last appointment on 6/1/21 continued Aricept, and Namenda.      Patient continues on Aricept and Namenda without side effects.      Short term memory is decreased, will repeat questions frequently.     No longer confused about where she is living.      Sx started 2019.  Trouble remembering family members.  Forgetting dates and times     Can use a  TV remote, microwave.      is preparing meals and taking care of the house.      Able to dress and bath herself, sits on a potty chair to bathe.      Weight is remaining stable. Drinking boost.      Sleeping unless her  gets her up, she wants to go back to bed all day. Some days energy is improved.     Depression  Has been on Lexapro 20 mg PO BID for the past 6 months with no improvement. Frequently tearful. Daughter wants to switch medications.     Crying intermittently due to feeling \"lucid\" and upset that she can't remember things.     Takes Buspar 15 mg PO TID daily as needed for anxiety. Usually taking 1 per day.     Reviewed medical records:     C/O tremors.  Taking Bentyl TID, oxycodone TID, GBP TID, propranolol.       Labs CBC, CMP, A1C, Vit D - ncs    The following portions of the patient's history were reviewed and updated as appropriate: allergies, current medications, past family history, past medical history, past social history, past surgical history and problem list.    Past Medical History:   Diagnosis Date   • Anxiety    • Arthritis    • Constipation    • Coronary artery disease    • Depression    • Dyslipidemia    • First degree AV block 10/8/2018    Added automatically from request for surgery [id number]   • Frequent falls    • " Hepatitis     PT SAYS SOMEONE TOLD HER YRS AGO THAT SHE HAD HEPATITIS BUT UNSURE WHAT KIND   • Hypertension    • IBS (irritable bowel syndrome)    • Irregular heart beat    • Ischemic heart disease    • Morbid obesity (CMS/HCC)     ; BMI > 40.     • Myocardial infarction (CMS/HCC)    • Osteoarthritis    • Panic attack    • PFO (patent foramen ovale)     PFO versus small ASD (echocardiogram on 05/06/2011) - asymptomatic.     • Sinus node dysfunction (CMS/HCC) 3/14/2019   • Staph infection     R FLANK   • Wears glasses    • Wears partial dentures     UPPER       Past Surgical History:   Procedure Laterality Date   • ANAL FISSURECTOMY/FISTULECTOMY  10/2002   • ANAL FISTULA REPAIR     • ANKLE SURGERY Left 1988   • ANKLE SURGERY Left 11/2001    ANKLE SCREW AND BONE CHIP REMOVAL    • CARDIAC CATHETERIZATION      with stents, placed over five years ago per patient.    • CARDIAC ELECTROPHYSIOLOGY PROCEDURE N/A 10/15/2018    Procedure: Pacemaker DC new;  Surgeon: Tracy Diego MD;  Location:  [facility];  Service: Cardiology   • CATARACT EXTRACTION     • CHOLECYSTECTOMY  05/2000   • COLONOSCOPY      LESS THAN 5 YRS AGO PER PT   • COLONOSCOPY N/A 1/29/2020    Procedure: COLONOSCOPY;  Surgeon: Ke Graves MD;  Location:  [facility];  Service: Gastroenterology   • ENDOSCOPY N/A 1/29/2020    Procedure: ESOPHAGOGASTRODUODENOSCOPY;  Surgeon: Ke Graves MD;  Location:  [facility];  Service: Gastroenterology   • FOOT SURGERY  1988    RIGHT HEEL SPUR    • HEMORRHOIDECTOMY  1996   • KNEE ARTHROPLASTY Bilateral     LEFT 2007, RIGHT YR UNKNOWN   • OTHER SURGICAL HISTORY Right 05/2005    Flank abcess (staph)       Social History     Socioeconomic History   • Marital status:      Spouse name: Not on file   • Number of children: Not on file   • Years of education: Not on file   • Highest education level: Not on file   Tobacco Use   • Smoking status: Never Smoker   • Smokeless  tobacco: Never Used   Vaping Use   • Vaping Use: Never used   Substance and Sexual Activity   • Alcohol use: No   • Drug use: No   • Sexual activity: Defer       Family History   Problem Relation Age of Onset   • Breast cancer Neg Hx    • Ovarian cancer Neg Hx         Objective:  There were no vitals taken for this visit.    Neurologic Exam     Mental Status   Oriented to person.   Speech: speech is normal   Level of consciousness: alert  Knowledge: poor and inconsistent with education.   Abnormal comprehension.     Cranial Nerves     CN VII   Facial expression full, symmetric.       Physical Exam  Vitals and nursing note reviewed.   Constitutional:       Appearance: Normal appearance.   Neurological:      Mental Status: She is alert.   Psychiatric:         Speech: Speech normal.         Assessment/Plan:       Diagnoses and all orders for this visit:    1. Moderate recurrent major depression (CMS/HCC) (Primary)  Assessment & Plan:  Patient's depression is recurrent and is moderate without psychosis. Their depression is currently active and the condition is unchanged. This will be reassessed at the next regular appointment. F/U as described:patient was prescribed an antidepressant medicine.    Decrease Lexapro to 20 mg PO daily X 1 week, then 10 mg PO daily X 1 week.     Then start Celexa 10 mg PO daily thereafter     Will follow up in 6-8 weeks or sooner if needed     Orders:  -     citalopram (CeleXA) 10 MG tablet; Take 1 tablet by mouth Daily.  Dispense: 30 tablet; Refill: 2    2. Memory loss    3. SDAT (senile dementia of Alzheimer's type) (CMS/HCC)  Assessment & Plan:  Psychological condition is improving with treatment.  Continue current treatment regimen.  Psychological condition  will be reassessed at the next regular appointment.    Some improvement with Aricept and Namenda     Continues with SLP     PT is finished              Reviewed medications, potential side effects and signs and symptoms to report.  Discussed risk versus benefits of treatment plan with patient and/or family-including medications, labs and radiology that may be ordered. Addressed questions and concerns during visit. Patient and/or family verbalized understanding and agree with plan. Patient instructed to call the office with questions or concerns and report to ED with life-threatening symptoms.     AS THE PROVIDER, I PERSONALLY WORE PPE DURING ENTIRE FACE TO FACE ENCOUNTER IN CLINIC WITH THE PATIENT. PATIENT ALSO WORE PPE DURING ENTIRE FACE TO FACE ENCOUNTER EXCEPT FOR A MAX OF 30 SECONDS DURING NEUROLOGICAL EVALUATION OF CRANIAL NERVES AND THEN MASK WAS PLACED BACK OVER PATIENT FACE FOR REMAINDER OF VISIT. I WASHED MY HANDS BEFORE AND AFTER VISIT.    During this visit the following were done:  Labs Reviewed []    Labs Ordered []    Radiology Reports Reviewed []    Radiology Ordered []    PCP Records Reviewed []    Referring Provider Records Reviewed []    ER Records Reviewed []    Hospital Records Reviewed []    History Obtained From Family []    Radiology Images Reviewed []    Other Reviewed []    Records Requested []      I spent a total of 20 minutes via video visit providing patient care.       Return in about 8 weeks (around 9/13/2021).      Qiana Rodriguez, APRN  7/19/2021

## 2021-07-19 NOTE — ASSESSMENT & PLAN NOTE
Psychological condition is improving with treatment.  Continue current treatment regimen.  Psychological condition  will be reassessed at the next regular appointment.    Some improvement with Aricept and Namenda     Continues with SLP     PT is finished

## 2021-07-19 NOTE — ASSESSMENT & PLAN NOTE
Patient's depression is recurrent and is moderate without psychosis. Their depression is currently active and the condition is unchanged. This will be reassessed at the next regular appointment. F/U as described:patient was prescribed an antidepressant medicine.    Decrease Lexapro to 20 mg PO daily X 1 week, then 10 mg PO daily X 1 week.     Then start Celexa 10 mg PO daily thereafter     Will follow up in 6-8 weeks or sooner if needed

## 2021-07-21 ENCOUNTER — HOME CARE VISIT (OUTPATIENT)
Dept: HOME HEALTH SERVICES | Facility: HOME HEALTHCARE | Age: 86
End: 2021-07-21

## 2021-07-21 VITALS
HEART RATE: 62 BPM | DIASTOLIC BLOOD PRESSURE: 70 MMHG | TEMPERATURE: 95.2 F | OXYGEN SATURATION: 92 % | SYSTOLIC BLOOD PRESSURE: 110 MMHG

## 2021-07-21 PROCEDURE — G0153 HHCP-SVS OF S/L PATH,EA 15MN: HCPCS

## 2021-07-28 ENCOUNTER — HOME CARE VISIT (OUTPATIENT)
Dept: HOME HEALTH SERVICES | Facility: HOME HEALTHCARE | Age: 86
End: 2021-07-28

## 2021-07-28 VITALS — RESPIRATION RATE: 16 BRPM | HEART RATE: 60 BPM | OXYGEN SATURATION: 92 % | TEMPERATURE: 96.5 F

## 2021-07-28 PROCEDURE — G0153 HHCP-SVS OF S/L PATH,EA 15MN: HCPCS

## 2021-08-22 PROCEDURE — 93296 REM INTERROG EVL PM/IDS: CPT | Performed by: INTERNAL MEDICINE

## 2021-08-22 PROCEDURE — 93294 REM INTERROG EVL PM/LDLS PM: CPT | Performed by: INTERNAL MEDICINE

## 2021-08-25 ENCOUNTER — TELEPHONE (OUTPATIENT)
Dept: NEUROLOGY | Facility: CLINIC | Age: 86
End: 2021-08-25

## 2021-08-25 NOTE — TELEPHONE ENCOUNTER
Provider: PHOEBE SCOTT      Caller: NILESH    Relationship to Patient: DAUGHTER    Pharmacy: [facility]    Phone Number: [phone]     CHALINO IS CALLING TO LET THE OFFICE KNOW THAT EVELYN IS FAXING OVER A PRIOR AUTH ON [employer]INE'S CELEXA.    MELCHORTIAGO  TOLD HER THAT IT TAKES ABOUT 7 DAYS TO GET THE AUTH AND [facility]OLINE HAS 9 PILLS LEFT.    PLEASE GET AUTH FOR MEDICATION.

## 2021-08-25 NOTE — TELEPHONE ENCOUNTER
Spoke to [employer] and answers additional clinical questions that needed to be answered.      Determination timeframe is 24-72 hours.

## 2021-10-13 DIAGNOSIS — F33.1 MODERATE RECURRENT MAJOR DEPRESSION (HCC): ICD-10-CM

## 2021-10-13 RX ORDER — CITALOPRAM 10 MG/1
10 TABLET ORAL DAILY
Qty: 30 TABLET | Refills: 2 | Status: SHIPPED | OUTPATIENT
Start: 2021-10-13 | End: 2022-01-01

## 2021-10-13 NOTE — TELEPHONE ENCOUNTER
Citalopram refill:     Last filled: 07/19/2021 with 2 additional refills    Last appt: 07/19/2021  Next appt: 12/01/2021

## 2021-11-03 NOTE — TELEPHONE ENCOUNTER
Provider: PHOEBE SCOTT    Caller: CHALINO    Relationship to Patient: DAUGHTER    Phone Number: [phone]    Reason for Call: PT'S DAUGHTER IS CALLING TO SEE IF PT'S UPCOMING VISIT W/ PHOEBE ON 12/01 CAN BE CONVERTED TO [facility].  SPOKE W/ KARMEN AT , WHO SAID I'D HAVE TO SPEAK TO KAILEY, BUT THAT SHE WAS AT [place].    PLEASE ADVISE IF PHOEBE IS OKAY CONDUCTING THIS 6 MO F/U ON MYCHART, OR IF PT NEEDS TO BE SEEN IN PERSON.    When was the patient last seen: 07/19/21

## 2021-11-23 NOTE — TELEPHONE ENCOUNTER
Rx Refill Note  Requested Prescriptions     Pending Prescriptions Disp Refills   • memantine (NAMENDA) 10 MG tablet [Pharmacy Med Name: MEMANTINE 10MG TABLETS] 60 tablet 6     Sig: TAKE 1 TABLET BY MOUTH TWICE DAILY      Last filled:6/1/21 with 6 refills  Sent this in. Thanks!  Last office visit with prescribing clinician: 4/5/2021      Next office visit with prescribing clinician: 12/1/2021

## 2021-12-01 NOTE — ASSESSMENT & PLAN NOTE
Patient's depression is recurrent and is mild without psychosis. Their depression is currently active and the condition is improving with treatment. This will be reassessed at the next regular appointment. F/U as described:patient will continue current medication therapy.    Stable on Celexa

## 2021-12-01 NOTE — PROGRESS NOTES
Subjective:     Patient ID: Iliana Oleary is a 87 y.o. female.    CC: No chief complaint on file.    This visit was conducted via video visit. The patient consented to receiving care via video visit prior to appointment.     HPI:   History of Present Illness   87 y.o. female returns in follow up for dementia. At last appointment on 7/19/21 continued Aricept, and Namenda, stopped Lexapro and started Celexa.      Patient continues on Aricept and Namenda without side effects.     No longer confused about where she is living. Was able to remember everyone at Thanksgiving.     Frequently repeats questions.      Sx started 2019.  Trouble remembering family members.  Forgetting dates and times     Can use a  TV remote, microwave.      is preparing meals and taking care of the house.      Able to dress and bath herself, sits on a potty chair to bathe.      Weight is remaining stable. Drinking boost.      Sleeping unless her  gets her up, she wants to go back to bed all day. Some days energy is improved.      Depression  Started on Celexa with improvement in crying. Denies side effects.      Takes Buspar 15 mg PO TID daily as needed for anxiety. Usually taking 1 per day.      Reviewed medical records:     C/O tremors.  Taking Bentyl TID, oxycodone TID, GBP TID, propranolol.       Labs CBC, CMP, A1C, Vit D - ncs      The following portions of the patient's history were reviewed and updated as appropriate: allergies, current medications, past family history, past medical history, past social history, past surgical history and problem list.    Past Medical History:   Diagnosis Date   • Anxiety    • Arthritis    • Constipation    • Coronary artery disease    • Depression    • Dyslipidemia    • First degree AV block 10/8/2018    Added automatically from request for surgery [id number]   • Frequent falls    • Hepatitis     PT SAYS SOMEONE TOLD HER YRS AGO THAT SHE HAD HEPATITIS BUT UNSURE WHAT KIND   • Hypertension     • IBS (irritable bowel syndrome)    • Irregular heart beat    • Ischemic heart disease    • Morbid obesity (HCC)     ; BMI > 40.     • Myocardial infarction (HCC)    • Osteoarthritis    • Panic attack    • PFO (patent foramen ovale)     PFO versus small ASD (echocardiogram on 05/06/2011) - asymptomatic.     • Sinus node dysfunction (HCC) 3/14/2019   • Staph infection     R FLANK   • Wears glasses    • Wears partial dentures     UPPER       Past Surgical History:   Procedure Laterality Date   • ANAL FISSURECTOMY/FISTULECTOMY  10/2002   • ANAL FISTULA REPAIR     • ANKLE SURGERY Left 1988   • ANKLE SURGERY Left 11/2001    ANKLE SCREW AND BONE CHIP REMOVAL    • CARDIAC CATHETERIZATION      with stents, placed over five years ago per patient.    • CARDIAC ELECTROPHYSIOLOGY PROCEDURE N/A 10/15/2018    Procedure: Pacemaker DC new;  Surgeon: Tracy Diego MD;  Location:  [facility] LOCATION;  Service: Cardiology   • CATARACT EXTRACTION     • CHOLECYSTECTOMY  05/2000   • COLONOSCOPY      LESS THAN 5 YRS AGO PER PT   • COLONOSCOPY N/A 1/29/2020    Procedure: COLONOSCOPY;  Surgeon: Ke Graves MD;  Location: [facility] END[facility];  Service: Gastroenterology   • ENDOSCOPY N/A 1/29/2020    Procedure: ESOPHAGOGASTRODUODENOSCOPY;  Surgeon: Ke Graves MD;  Location: [facility];  Service: Gastroenterology   • FOOT SURGERY  1988    RIGHT HEEL SPUR    • HEMORRHOIDECTOMY  1996   • KNEE ARTHROPLASTY Bilateral     LEFT 2007, RIGHT YR UNKNOWN   • OTHER SURGICAL HISTORY Right 05/2005    Flank abcess (staph)       Social History     Socioeconomic History   • Marital status:    Tobacco Use   • Smoking status: Never Smoker   • Smokeless tobacco: Never Used   Vaping Use   • Vaping Use: Never used   Substance and Sexual Activity   • Alcohol use: No   • Drug use: No   • Sexual activity: Defer       Family History   Problem Relation Age of Onset   • Breast cancer Neg Hx    • Ovarian cancer Neg Hx          Objective:  There were no vitals taken for this visit.    Neurologic Exam     Mental Status   Oriented to person.   Speech: speech is normal   Level of consciousness: alert  Abnormal comprehension.     Cranial Nerves     CN VII   Facial expression full, symmetric.       Physical Exam  Vitals and nursing note reviewed.   Constitutional:       Appearance: Normal appearance.   Neurological:      Mental Status: She is alert.   Psychiatric:         Mood and Affect: Mood normal.         Speech: Speech normal.         Assessment/Plan:       Diagnoses and all orders for this visit:    1. Memory loss (Primary)    2. SDAT (senile dementia of Alzheimer's type) ([facility])  Assessment & Plan:  Psychological condition is improving with treatment.  Continue current treatment regimen.  Psychological condition  will be reassessed at the next regular appointment.    Stable on Namenda and Aricpet       3. Moderate recurrent major depression ([facility])  Assessment & Plan:  Patient's depression is recurrent and is mild without psychosis. Their depression is currently active and the condition is improving with treatment. This will be reassessed at the next regular appointment. F/U as described:patient will continue current medication therapy.    Stable on Celexa              Reviewed medications, potential side effects and signs and symptoms to report. Discussed risk versus benefits of treatment plan with patient and/or family-including medications, labs and radiology that may be ordered. Addressed questions and concerns during visit. Patient and/or family verbalized understanding and agree with plan. Patient instructed to call the office with questions or concerns and report to ED with life-threatening symptoms.     AS THE PROVIDER, I PERSONALLY WORE PPE DURING ENTIRE FACE TO FACE ENCOUNTER IN CLINIC WITH THE PATIENT. PATIENT ALSO WORE PPE DURING ENTIRE FACE TO FACE ENCOUNTER EXCEPT FOR A MAX OF 30 SECONDS DURING NEUROLOGICAL EVALUATION OF  CRANIAL NERVES AND THEN MASK WAS PLACED BACK OVER PATIENT FACE FOR REMAINDER OF VISIT. I WASHED MY HANDS BEFORE AND AFTER VISIT.    During this visit the following were done:  Labs Reviewed []    Labs Ordered []    Radiology Reports Reviewed []    Radiology Ordered []    PCP Records Reviewed []    Referring Provider Records Reviewed []    ER Records Reviewed []    Hospital Records Reviewed []    History Obtained From Family []    Radiology Images Reviewed []    Other Reviewed []    Records Requested []      I spent a total of 15 minutes via video visit providing patient care.     Return in about 6 months (around 6/1/2022).      Qiana Rodriguez, ALAN  12/1/2021

## 2021-12-01 NOTE — ASSESSMENT & PLAN NOTE
Psychological condition is improving with treatment.  Continue current treatment regimen.  Psychological condition  will be reassessed at the next regular appointment.    Stable on Namenda and Aricpet

## 2022-01-01 ENCOUNTER — TELEPHONE (OUTPATIENT)
Dept: NEUROLOGY | Facility: CLINIC | Age: 87
End: 2022-01-01

## 2022-01-01 ENCOUNTER — TELEMEDICINE (OUTPATIENT)
Dept: NEUROLOGY | Facility: CLINIC | Age: 87
End: 2022-01-01

## 2022-01-01 ENCOUNTER — OFFICE VISIT (OUTPATIENT)
Dept: CARDIOLOGY | Facility: CLINIC | Age: 87
End: 2022-01-01

## 2022-01-01 ENCOUNTER — TELEPHONE (OUTPATIENT)
Dept: CARDIOLOGY | Facility: CLINIC | Age: 87
End: 2022-01-01

## 2022-01-01 VITALS
WEIGHT: 166 LBS | OXYGEN SATURATION: 92 % | HEART RATE: 60 BPM | BODY MASS INDEX: 32.59 KG/M2 | SYSTOLIC BLOOD PRESSURE: 110 MMHG | HEIGHT: 60 IN | DIASTOLIC BLOOD PRESSURE: 67 MMHG

## 2022-01-01 DIAGNOSIS — I25.10 CORONARY ARTERY DISEASE INVOLVING NATIVE CORONARY ARTERY OF NATIVE HEART WITHOUT ANGINA PECTORIS: Chronic | ICD-10-CM

## 2022-01-01 DIAGNOSIS — G47.20 DISTURBED SLEEP RHYTHM: ICD-10-CM

## 2022-01-01 DIAGNOSIS — F02.80 SDAT (SENILE DEMENTIA OF ALZHEIMER'S TYPE): Primary | ICD-10-CM

## 2022-01-01 DIAGNOSIS — R41.3 MEMORY LOSS: ICD-10-CM

## 2022-01-01 DIAGNOSIS — I49.5 SINUS NODE DYSFUNCTION: Primary | ICD-10-CM

## 2022-01-01 DIAGNOSIS — F33.1 MODERATE RECURRENT MAJOR DEPRESSION: ICD-10-CM

## 2022-01-01 DIAGNOSIS — I10 PRIMARY HYPERTENSION: ICD-10-CM

## 2022-01-01 DIAGNOSIS — R41.3 MEMORY LOSS: Primary | ICD-10-CM

## 2022-01-01 DIAGNOSIS — G30.1 SDAT (SENILE DEMENTIA OF ALZHEIMER'S TYPE): Primary | ICD-10-CM

## 2022-01-01 PROCEDURE — 99213 OFFICE O/P EST LOW 20 MIN: CPT | Performed by: NURSE PRACTITIONER

## 2022-01-01 PROCEDURE — 93296 REM INTERROG EVL PM/IDS: CPT | Performed by: INTERNAL MEDICINE

## 2022-01-01 PROCEDURE — 93294 REM INTERROG EVL PM/LDLS PM: CPT | Performed by: INTERNAL MEDICINE

## 2022-01-01 PROCEDURE — 99214 OFFICE O/P EST MOD 30 MIN: CPT | Performed by: NURSE PRACTITIONER

## 2022-01-01 PROCEDURE — 93000 ELECTROCARDIOGRAM COMPLETE: CPT | Performed by: NURSE PRACTITIONER

## 2022-01-01 RX ORDER — ARIPIPRAZOLE 5 MG/1
5 TABLET ORAL DAILY
Qty: 30 TABLET | Refills: 2 | Status: SHIPPED | OUTPATIENT
Start: 2022-01-01

## 2022-01-01 RX ORDER — CITALOPRAM 10 MG/1
10 TABLET ORAL DAILY
Qty: 30 TABLET | Refills: 2 | Status: SHIPPED | OUTPATIENT
Start: 2022-01-01 | End: 2022-01-01 | Stop reason: SDUPTHER

## 2022-01-01 RX ORDER — ARIPIPRAZOLE 5 MG/1
5 TABLET ORAL DAILY
Qty: 30 TABLET | Refills: 5 | Status: SHIPPED | OUTPATIENT
Start: 2022-01-01 | End: 2022-01-01 | Stop reason: SDUPTHER

## 2022-01-01 RX ORDER — CITALOPRAM 10 MG/1
10 TABLET ORAL DAILY
Qty: 30 TABLET | Refills: 5 | Status: SHIPPED | OUTPATIENT
Start: 2022-01-01 | End: 2023-05-03

## 2022-01-01 RX ORDER — MEMANTINE HYDROCHLORIDE 10 MG/1
TABLET ORAL
Qty: 60 TABLET | Refills: 6 | Status: SHIPPED | OUTPATIENT
Start: 2022-01-01

## 2022-04-19 NOTE — TELEPHONE ENCOUNTER
Caller: Carlie Carr    Relationship: Emergency Contact; DAUGHTER    Best call back number: [phone]    What was the call regarding: PT'S DAUGHTER CALLED AFTER RECEIVING LETTER FROM THE OFFICE INFORMING OF ALAN SOFIA'S DEPARTURE FROM THE PRACTICE. PT'S DAUGHTER STATES SHE IS AGREEABLE TO SCHEDULING F/U W/ ALAN KHAN. PT IS DUE FOR 6 MTH F/U APPT AROUND 6/1/22- PT'S DAUGHTER WOULD LIKE TO KNOW IF THERESA IS AGREEABLE TO COMPLETING PT'S VISIT [facility]IA [employer] OR IF THEY'D NEED TO SCHEDULE FOR A DAY THEY CAN COME INTO THE OFFICE.    Do you require a callback: YES, PLEASE.    PLEASE REVIEW AND ADVISE.

## 2022-05-03 PROBLEM — N18.4 CKD (CHRONIC KIDNEY DISEASE) STAGE 4, GFR 15-29 ML/MIN (HCC): Status: ACTIVE | Noted: 2022-01-01

## 2022-05-03 NOTE — TELEPHONE ENCOUNTER
Caller: CHALINO     Relationship: DAUGHTER     Best call back number: [phone]      Requested Prescriptions:   CITALOPRAM 10 MG     Requested Prescriptions      No prescriptions requested or ordered in this encounter        Pharmacy where request should be sent:  [id number] - [place], [place] - [facility] [address] AT [address] - [phone]  - [phone] FX  [phone]    Additional details provided by patient: COMP. OUT OF RX NEEDS ASAP     Does the patient have less than a 3 day supply:  [x] Yes  [] No    PLEASE ADVISE.     Lucy MUÑOZ Rep   05/03/22 09:33 EDT

## 2022-05-03 NOTE — PROGRESS NOTES
[facility] Cardiology    Patient ID: Iliana Oleary is a 88 y.o. female.  : 1934   Contact: [phone]    Encounter date: 2022    PCP: Sreekanth Shi APRN      Chief complaint:   Chief Complaint   Patient presents with   • Coronary Artery Disease       PROBLEM LIST:  1. Coronary artery disease:  a. [facility],  2009, Dr. Warner: PCI to proximal LAD (2.75 x 28 mm Promus CIRO).     b. [facility], 2009, Dr. Mejia: No ISR or coronary artery obstruction; normal LV function.  c. Cardiolite stress test, 2014: Negative for ischemia and scar. EF 73% without WMA.   d. Stress PET, 2016: EF 67%. No evidence of inducible ischemia.  e. Echocardiogram, 2016: EF 60%. Mild MR/TR.  f. Echocardiogram, 2017: EF >70%. Moderate concentric LVH. Diastolic dysfunction. Mildly dilated RV with reduced systolic function. Relative systolic sparing of the apex. RV:LV ratio is 1.0.   2. Severe bradycardia due to irreversible sinus node dysfunction:  a. Holter monitor, 10/01/2018: SR, 1st-degree AVB, 31 pauses. Needs PPM.  b. PPM [place], 10/15/2018[place] [facility] [place] model L311 serial #[id number]. DDDR .  c. DDDR switched to Rhythmiq for intrinsic conduction at the time of device interrogation 10/16/2019.   3. Hypertension.   4. Atrial flutter with controlled ventricular response noted on device interrogation 10/27/2019  5. DVT:  a. Duplex Venous BLE, 2017. Acute RLE DVT in the common femoral, proximal femoral and peroneal. Acute RLE superficial thrombosis noted in the saphenofemoral junction. All other veins appeared normal bilaterally.   6. Dyslipidemia.   7. PFO vs. small ASD (echocardiogram on 2011) - asymptomatic.    8. Morbid obesity; BMI > 40.    9. Osteoarthritis.   10. CKD  11. Status post surgery, remote:  a. Hemorrhoidectomy, .  b. Right  heel spur surgery, .  c. Left ankle fracture stabilization, 1988.  d. Left hand trigger  finger; Right hand trigger finger repair, 1996.    e. Cholecystectomy, May 2000.  f. Left ankle screw and bone chip removal, November 2001.  g. Anal fistulectomy,  October 2002.  h. Right flank abscess (staph), May 2005.  i. Left total knee replacement, October 2007.  j. Right knee replacement, August 2016    Allergies and Medications:  Allergies   Allergen Reactions   • Ciprofloxacin GI Intolerance   • Tramadol GI Intolerance   • Chlorhexidine Rash   • Codeine Nausea And Vomiting     N/V   • Morphine And Related Nausea And Vomiting     N/V       Current Medications:    Current Outpatient Medications:   •  aspirin 81 MG EC tablet, Take 81 mg by mouth Every Morning., Disp: , Rfl:   •  atorvastatin (LIPITOR) 40 MG tablet, Take 40 mg by mouth Every Night., Disp: , Rfl:   •  busPIRone (BUSPAR) 15 MG tablet, Take 15 mg by mouth 3 (Three) Times a Day., Disp: , Rfl:   •  Cholecalciferol (Vitamin D3) 25 MCG (1000 UT) capsule, Take 1,000 Units by mouth Daily., Disp: , Rfl:   •  citalopram (CeleXA) 10 MG tablet, Take 1 tablet by mouth Daily., Disp: 30 tablet, Rfl: 5  •  dicyclomine (Bentyl) 20 MG tablet, Take 1 tablet by mouth Every 6 (Six) Hours. Indications: Irritable Bowel Syndrome, Disp: 90 tablet, Rfl: 1  •  donepezil (ARICEPT) 10 MG tablet, Take 1 tablet by mouth Daily. (Patient taking differently: Take 23 mg by mouth Daily.), Disp: 30 tablet, Rfl: 5  •  gabapentin (NEURONTIN) 400 MG capsule, Take 400 mg by mouth 3 (Three) Times a Day., Disp: , Rfl:   •  lansoprazole (PREVACID) 30 MG capsule, Take 30 mg by mouth daily., Disp: , Rfl:   •  memantine (NAMENDA) 10 MG tablet, TAKE 1 TABLET BY MOUTH TWICE DAILY, Disp: 60 tablet, Rfl: 6  •  metOLazone (ZAROXOLYN) 2.5 MG tablet, TAKE 1 TABLET BY MOUTH DAILY, Disp: 90 tablet, Rfl: 2  •  Multiple Vitamins-Minerals (MULTIVITAMIN PO), Take 1 tablet by mouth daily., Disp: , Rfl:   •  O2 (OXYGEN), Inhale 3 L/min Daily. Per patient uses at night, Disp: , Rfl:   •  oxybutynin XL  "(DITROPAN-XL) 10 MG 24 hr tablet, 10 mg Daily., Disp: , Rfl: 2  •  oxyCODONE (ROXICODONE) 5 MG immediate release tablet, Take 5 mg by mouth Every 8 (Eight) Hours., Disp: , Rfl:   •  Probiotic Product (PROBIOTIC PO), Take 1 tablet by mouth Every Other Day., Disp: , Rfl:   •  propranolol (INDERAL) 20 MG tablet, Take 20 mg by mouth Daily., Disp: , Rfl:   •  spironolactone (ALDACTONE) 50 MG tablet, TAKE 1 TABLET BY MOUTH DAILY, Disp: 90 tablet, Rfl: 0  •  apixaban (ELIQUIS) 2.5 MG tablet tablet, Take 1 tablet by mouth 2 (Two) Times a Day., Disp: 60 tablet, Rfl: 11    HPI    Iliana Oleary is a 88 y.o. female who presents today for follow up on CAD, SSS, s/p DDD PPM, HTN. Patient has not been seen since 2020. Since then, she has done well from a cardiac standpoint. Her daughter has advised that her renal function has declined and now her GFR is in the 20's. She denies any bleeding, bruising. No stroke like symptoms. No CP, SOA, PND, orthopnea.        The following portions of the patient's history were reviewed and updated as appropriate: allergies, current medications and problem list.    Pertinent positives as listed in the HPI.  All other systems reviewed are negative.         Vitals:    05/03/22 1309   BP: 110/67   BP Location: Left arm   Patient Position: Sitting   Pulse: 60   SpO2: 92%   Weight: 75.3 kg (166 lb)   Height: 152.4 cm (60\")       Physical Exam:  General: Alert and oriented.  Neck: Jugular venous pressure is within normal limits. Carotids have normal upstrokes without bruits.   Cardiovascular: Heart has a nondisplaced focal PMI. Regular rate and rhythm without murmur, gallop or rub.  Lungs: Clear without rales or wheezes. Equal expansion is noted.   Extremities: Show no edema.  Skin: Warm and dry.  Neurologic: Nonfocal.     Diagnostic Data:  Lab Results   Component Value Date    GLUCOSE 114 (H) 02/13/2021    BUN 26 (H) 02/13/2021    CREATININE 1.21 (H) 02/13/2021    EGFRIFNONA 42 (L) 02/13/2021    " BCR 21.5 02/13/2021    K 5.4 (H) 02/13/2021    CO2 26.0 02/13/2021    CALCIUM 9.6 02/13/2021    PROTENTOTREF 5.1 (L) 09/20/2017    ALBUMIN 3.66 10/14/2018    LABIL2 0.9 09/20/2017    AST 26 10/14/2018    ALT 11 10/14/2018     Lab Results   Component Value Date    GLUCOSE 114 (H) 02/13/2021    CALCIUM 9.6 02/13/2021     (L) 02/13/2021    K 5.4 (H) 02/13/2021    CO2 26.0 02/13/2021     02/13/2021    BUN 26 (H) 02/13/2021    CREATININE 1.21 (H) 02/13/2021    EGFRIFNONA 42 (L) 02/13/2021    BCR 21.5 02/13/2021    ANIONGAP 9.0 02/13/2021     Lab Results   Component Value Date    CHOL 136 07/11/2016    TRIG 163 (H) 07/11/2016    HDL 37 (L) 07/11/2016    LDL 71 07/11/2016     Lab Results   Component Value Date    WBC 10.47 02/13/2021    HGB 13.4 02/13/2021    HCT 41.2 02/13/2021    MCV 92.8 02/13/2021     02/13/2021     Lab Results   Component Value Date    TSH 1.564 07/11/2016     Device check 5/3/22: Normal threshold and impedance, battery life 4.5 yrs. 1 AMS < 1 min, > 1 year ago. Increased RA and RV to 2.6 v@0.5 ms.       ECG 12 Lead    Date/Time: 5/3/2022 1:44 PM  Performed by: Tiana Bedolla APRN  Authorized by: Tiana Bedolla APRN   Comparison: compared with previous ECG from 10/16/2018  Rhythm: paced  BPM: 71              ASSESSMENT:    ICD-10-CM ICD-9-CM   1. Sinus node dysfunction (HCC)  I49.5 427.81   2. Primary hypertension  I10 401.9   3. Coronary artery disease involving native coronary artery of native heart without angina pectoris  I25.10 414.01     Lab results found above were reviewed with the patient.      PLAN:  1. Reduce Eliquis to 2.5 mg bid due to ?89 y/o and Cr > 1.5.   2. Obtain FLP and CMP from PCP.   3. Continue ASA, statin for CAd  4. Continue propranolol for HTN.   5. Continue all other current medications.  6. F/up in 12 months, sooner if needed.      Electronically signed by ALAN Spring, 05/03/22, 1:43 PM EDT.

## 2022-05-03 NOTE — TELEPHONE ENCOUNTER
Rx Refill Note  Requested Prescriptions     Pending Prescriptions Disp Refills   • citalopram (CeleXA) 10 MG tablet 30 tablet 2     Sig: Take 1 tablet by mouth Daily.      Last filled: 01/10/2022 30 with 2 refills.   Last office visit with prescribing clinician: 06/01/2021      Next office visit with prescribing clinician: 6/2/2022     Sent in 30 with 5 refills.      Sera Cullen MA  05/03/22, 10:07 EDT

## 2022-06-14 NOTE — TELEPHONE ENCOUNTER
Rx Refill Note  Requested Prescriptions     Pending Prescriptions Disp Refills   • memantine (NAMENDA) 10 MG tablet [Pharmacy Med Name: MEMANTINE 10MG TABLETS] 60 tablet 6     Sig: TAKE 1 TABLET BY MOUTH TWICE DAILY      Last filled: 11/23/2021 60 with 6 refills.  Last office visit with prescribing clinician: 6/1/2021      Next office visit with prescribing clinician: 07/11/2022     Sent in 60 with 5 refills.    Sera Cullen MA  06/14/22, 09:09 EDT

## 2022-06-24 NOTE — TELEPHONE ENCOUNTER
Caller: CHALINO    Relationship: DAUGHTER    Best call back number: [phone]    What medication are you requesting: MELATONIN 3MG (OTC) NO PRESCRIPTION NEEDED     What are your current symptoms: SHE IS STARTING TO WONDER IN THE MIDDLE OF THE NIGHT. GETTING HER DAYS AND NIGHTS.    How long have you been experiencing symptoms: WITHIN PAST FEW WEEKS    Additional notes: SHE IS GOING TO BE GOING TO HER PARENTS HOUSE AROUND 5 TODAY AND WOULD REALLY LIKE AN ANSWER BEFORE 4PM TODAY PLEASE.

## 2022-07-11 NOTE — PROGRESS NOTES
Neuro Office Visit      Encounter Date: 2022   Patient Name: Iliana Oleary  : 1934   MRN: [id number]   PCP: ALAN Rosales  Chief Complaint:    Chief Complaint   Patient presents with   • Dementia       You have chosen to receive care through a telehealth visit.  Do you consent to use a video/audio connection for your medical care today? Yes    History of Present Illness: Iliana Oleary is a 88 y.o. female who is here today for telehealth appt with her daughter and  for dementia and depression.    Last appt 21 w ALAN Pollard-cont aricept and namenda    Dementia  Taking Aricept, namenda.  Started Melatonin 3mg without improvement in sx. Getting up every 2 hours.  Poor appetite. Eats a good brunch. Most alert from 3pm to 6 pm.   is primary caregiver. He is 92. Puts her to sleep at 7am and she wakes up at noon but does not stay asleep.    Using bedside commode. Having some stool incontinence. Gets up to go to bathroom.  sleeps in a different room.  Not sleeping. Walking around at night. Using walker.    More confused-talking to people who are .    PH  Onset 2019, forgetting names, dates and times.     Depression  Taking Celexa. Moods are about the same.  Subjective      Past Medical History:   Past Medical History:   Diagnosis Date   • Alzheimer disease (HCC) 2018   • Anxiety    • Arthritis    • Constipation    • Coronary artery disease    • Depression    • Dyslipidemia    • First degree AV block 10/08/2018    Added automatically from request for surgery [id number]   • Frequent falls    • Hepatitis     PT SAYS SOMEONE TOLD HER YRS AGO THAT SHE HAD HEPATITIS BUT UNSURE WHAT KIND   • Hypertension    • IBS (irritable bowel syndrome)    • Irregular heart beat    • Ischemic heart disease    • Morbid obesity (HCC)     ; BMI > 40.     • Myocardial infarction (HCC)    • Osteoarthritis    • Panic attack    • PFO (patent foramen ovale)     PFO versus small ASD  (echocardiogram on 05/06/2011) - asymptomatic.     • Sinus node dysfunction (HCC) 03/14/2019   • Staph infection     R FLANK   • Wears glasses    • Wears partial dentures     UPPER       Past Surgical History:   Past Surgical History:   Procedure Laterality Date   • ANAL FISSURECTOMY/FISTULECTOMY  10/2002   • ANAL FISTULA REPAIR     • ANKLE SURGERY Left 1988   • ANKLE SURGERY Left 11/2001    ANKLE SCREW AND BONE CHIP REMOVAL    • CARDIAC CATHETERIZATION      with stents, placed over five years ago per patient.    • CARDIAC ELECTROPHYSIOLOGY PROCEDURE N/A 10/15/2018    Procedure: Pacemaker DC new;  Surgeon: Tracy Diego MD;  Location:  [facility] CATH INVASIVE LOCATION;  Service: Cardiology   • CATARACT EXTRACTION     • CHOLECYSTECTOMY  05/2000   • COLONOSCOPY      LESS THAN 5 YRS AGO PER PT   • COLONOSCOPY N/A 1/29/2020    Procedure: COLONOSCOPY;  Surgeon: Ke Graves MD;  Location:  [facility] ENDOSCOPY;  Service: Gastroenterology   • ENDOSCOPY N/A 1/29/2020    Procedure: ESOPHAGOGASTRODUODENOSCOPY;  Surgeon: Ke Graves MD;  Location:  [facility] ENDOSCOPY;  Service: Gastroenterology   • FOOT SURGERY  1988    RIGHT HEEL SPUR    • HEMORRHOIDECTOMY  1996   • KNEE ARTHROPLASTY Bilateral     LEFT 2007, RIGHT YR UNKNOWN   • OTHER SURGICAL HISTORY Right 05/2005    Flank abcess (staph)       Family History:   Family History   Problem Relation Age of Onset   • Breast cancer Neg Hx    • Ovarian cancer Neg Hx        Social History:   Social History     Socioeconomic History   • Marital status:    Tobacco Use   • Smoking status: Never Smoker   • Smokeless tobacco: Never Used   Vaping Use   • Vaping Use: Never used   Substance and Sexual Activity   • Alcohol use: No   • Drug use: No   • Sexual activity: Defer       Medications:     Current Outpatient Medications:   •  apixaban (ELIQUIS) 2.5 MG tablet tablet, Take 1 tablet by mouth 2 (Two) Times a Day., Disp: 60 tablet, Rfl: 11  •  aspirin 81 MG EC tablet,  Take 81 mg by mouth Every Morning., Disp: , Rfl:   •  atorvastatin (LIPITOR) 40 MG tablet, Take 40 mg by mouth Every Night., Disp: , Rfl:   •  busPIRone (BUSPAR) 15 MG tablet, Take 15 mg by mouth 2 (Two) Times a Day., Disp: , Rfl:   •  Cholecalciferol (Vitamin D3) 25 MCG (1000 UT) capsule, Take 1,000 Units by mouth Daily., Disp: , Rfl:   •  citalopram (CeleXA) 10 MG tablet, Take 1 tablet by mouth Daily., Disp: 30 tablet, Rfl: 5  •  dicyclomine (Bentyl) 20 MG tablet, Take 1 tablet by mouth Every 6 (Six) Hours. Indications: Irritable Bowel Syndrome, Disp: 90 tablet, Rfl: 1  •  donepezil (ARICEPT) 10 MG tablet, Take 1 tablet by mouth Daily. (Patient taking differently: Take 23 mg by mouth Daily.), Disp: 30 tablet, Rfl: 5  •  gabapentin (NEURONTIN) 400 MG capsule, Take 400 mg by mouth 3 (Three) Times a Day., Disp: , Rfl:   •  lansoprazole (PREVACID) 30 MG capsule, Take 30 mg by mouth daily., Disp: , Rfl:   •  memantine (NAMENDA) 10 MG tablet, TAKE 1 TABLET BY MOUTH TWICE DAILY, Disp: 60 tablet, Rfl: 6  •  metOLazone (ZAROXOLYN) 2.5 MG tablet, TAKE 1 TABLET BY MOUTH DAILY, Disp: 90 tablet, Rfl: 2  •  Multiple Vitamins-Minerals (MULTIVITAMIN PO), Take 1 tablet by mouth daily., Disp: , Rfl:   •  O2 (OXYGEN), Inhale 3 L/min Daily. Per patient uses at night, Disp: , Rfl:   •  oxybutynin XL (DITROPAN-XL) 10 MG 24 hr tablet, 10 mg Daily., Disp: , Rfl: 2  •  oxyCODONE (ROXICODONE) 5 MG immediate release tablet, Take 5 mg by mouth Every 8 (Eight) Hours., Disp: , Rfl:   •  Probiotic Product (PROBIOTIC PO), Take 1 tablet by mouth Every Other Day., Disp: , Rfl:   •  propranolol (INDERAL) 20 MG tablet, Take 20 mg by mouth Daily., Disp: , Rfl:   •  spironolactone (ALDACTONE) 50 MG tablet, TAKE 1 TABLET BY MOUTH DAILY, Disp: 90 tablet, Rfl: 0  •  ARIPiprazole (ABILIFY) 5 MG tablet, Take 1 tablet by mouth Daily., Disp: 30 tablet, Rfl: 5    Allergies:   Allergies   Allergen Reactions   • Ciprofloxacin GI Intolerance   • Tramadol GI  Intolerance   • Chlorhexidine Rash   • Codeine Nausea And Vomiting     N/V   • Morphine And Related Nausea And Vomiting     N/V       PHQ-9 Total Score:     STEADI Fall Risk Assessment has not been completed.    Objective     Physical Exam:   Physical Exam  Constitutional:       General: She is not in acute distress.     Appearance: Normal appearance. She is not ill-appearing.   HENT:      Head: Normocephalic and atraumatic.      Nose: Nose normal.   Eyes:      Conjunctiva/sclera: Conjunctivae normal.   Pulmonary:      Effort: Pulmonary effort is normal. No respiratory distress.   Neurological:      Mental Status: Mental status is at baseline.         Neurologic Exam     Vital Signs: There were no vitals filed for this visit.  There is no height or weight on file to calculate BMI.     Assessment / Plan      Assessment/Plan:   Diagnoses and all orders for this visit:    1. Memory loss (Primary)  Comments:  Cont Aricept and namenda  Orders:  -     ARIPiprazole (ABILIFY) 5 MG tablet; Take 1 tablet by mouth Daily.  Dispense: 30 tablet; Refill: 5    2. Moderate recurrent major depression (HCC)  Comments:  Cont Lexapro    3. Disturbed sleep rhythm  Comments:  start Abilify     Discussed realistic expectations of medicaitons and amount of time she should be in bed. Discussed using a bed alarm or baby monitor. Move potty chair closer to her bed.    Patient Education:       Reviewed medications, potential side effects and signs and symptoms to report. Discussed risk versus benefits of treatment plan with patient and/or family-including medications, labs and radiology that may be ordered. Addressed questions and concerns during visit. Patient and/or family verbalized understanding and agree with plan. Instructed to call the office with any questions and report to ER with any life-threatening symptoms.     Follow Up:   Return in about 2 months (around 9/11/2022) for Recheck.    During this visit the following were done:  Labs  Reviewed []    Labs Ordered []    Radiology Reports Reviewed []    Radiology Ordered []    PCP Records Reviewed []    Referring Provider Records Reviewed []    ER Records Reviewed []    Hospital Records Reviewed []    History Obtained From Family []    Radiology Images Reviewed []    Other Reviewed []    Records Requested []      Ne Alcantar, DNP, APRN

## 2022-07-12 NOTE — TELEPHONE ENCOUNTER
PATIENT'S DAUGHTER, CHALINO ROACH, ON  [place], CALLED, STATES THAT SHE THOUGHT [facility] WAS GOING TO PRESCRIBE A SLEEPING PILL FOR THE PATIENT. STATES INSURANCE IS STILL DENYING ABILIFY    PLEASE CALL HER AND ADVISE    HER CALL BACK NUMBER [phone]

## 2022-07-12 NOTE — TELEPHONE ENCOUNTER
Aahsish with [facility] on [address] called stating patient's Abilify will not run through under the diagnosis Amnesia, insurance is denying. Daughter asked if he could run it under a sleep med but states it will not go through under this either.     Notified pharmacist she also has Major Depressive Disorder but Aashish states they will either need a new script with a different attached Dx that he can run through or a different medication all together.      Will let provider know. Thanks!

## 2022-07-13 NOTE — TELEPHONE ENCOUNTER
Caller: CHALINO  Relationship to Patient: DAUGHTER  Phone Number: [phone]  Reason for Call: SHE STATES THAT THE INSURANCE HAS DENIED THE NEW CODE AS WELL, SHE IS WONDERING WHAT OTHER OPTIONS ARE AVAILABLE. PLEASE REVIEW AND ADVISE

## 2022-07-13 NOTE — TELEPHONE ENCOUNTER
Abilify is for sleep as well and I resent it with a new diagnosis. If not approved we will need to PA

## 2022-08-29 NOTE — TELEPHONE ENCOUNTER
PATIENTS DAUGHTER CALLED TO ADVISE PATIENT IS NOW UNDER HOME HOSPICE CARE - [facility] [employer].

## 2022-09-21 NOTE — TELEPHONE ENCOUNTER
Remote monitor not transmitting, called Carlie Carr and left message to check connection and send manual transmission.

## 2022-09-21 NOTE — TELEPHONE ENCOUNTER
Daughter called back to say she will be at her mothers house next Monday and will call us for assistance.

## 2022-09-26 NOTE — TELEPHONE ENCOUNTER
Patient's daughter called to have help sending a remote download.  No lights are coming on the [employer] Monitor, after multiple attempts.  I gave daughter the [employer] customer service number, she will call them for help.

## 2022-09-26 NOTE — TELEPHONE ENCOUNTER
Pt's daughter, Carlie Carr, called back to report that [facility] [employer] service states the pt's monitor is failing. Pt needs a new monitor, it''s now ordered but the monitor are on back order.     Ms. Carr then states pt is now in palliative care. I asked Ms. Carr if they want to continue home monitoring. Ms. Carr states she will talk with the palliative care nurse this Thursday, 9/29/2022 & will then notify the device clinic of the decision.

## (undated) DEVICE — ST EXT IV SMARTSITE 2VLV SP M LL 5ML IV1

## (undated) DEVICE — TUBING, SUCTION, 1/4" X 10', STRAIGHT: Brand: MEDLINE

## (undated) DEVICE — 3M™ IOBAN™ 2 ANTIMICROBIAL INCISE DRAPE 6650EZ: Brand: IOBAN™ 2

## (undated) DEVICE — ESOPHAGEAL WIREGUIDED BALLOON DILATATION CATHETER: Brand: CRE WIREGUIDED

## (undated) DEVICE — SINGLE-USE BIOPSY FORCEPS: Brand: RADIAL JAW 4

## (undated) DEVICE — THE SINGLE USE ETRAP – POLYP TRAP IS USED FOR SUCTION RETRIEVAL OF ENDOSCOPICALLY REMOVED POLYPS.: Brand: ETRAP

## (undated) DEVICE — SNAR POLYP SENSATION JUMBO OVL 30 240X20

## (undated) DEVICE — ST INF PRI SMRTSTE 20DRP 2VLV 24ML 117

## (undated) DEVICE — 3M™ TEGADERM™ CHG DRESSING 25/CARTON 4 CARTONS/CASE 1659: Brand: TEGADERM™

## (undated) DEVICE — KT BIOGUARD SXN VLV AIR/H20 4PC DISP

## (undated) DEVICE — INTRO TEAR AWAY/LVD W/SD PRT 6F 13CM

## (undated) DEVICE — INTRO ACCSR BLNT TP

## (undated) DEVICE — CANNULA,ADULT,SOFT-TOUCH,7'TUBE,UC: Brand: PENDING

## (undated) DEVICE — SYR LUERLOK 50ML

## (undated) DEVICE — LEX CATH LAB MINOR: Brand: MEDLINE INDUSTRIES, INC.

## (undated) DEVICE — FIAPC® PROBE W/ FILTER 3000 A OD 2.3MM/6.9FR; L 3M/9.8FT: Brand: ERBE

## (undated) DEVICE — THE BITE BLOCK MAXI, LATEX FREE STRAP IS USED TO PROTECT THE ENDOSCOPE INSERTION TUBE FROM BEING BITTEN BY THE PATIENT.

## (undated) DEVICE — CAUTERY TIP POLISHER: Brand: DEVON

## (undated) DEVICE — LIMB HOLDER, WRIST/ANKLE: Brand: DEROYAL

## (undated) DEVICE — DEV INFL ALLIANCE2 SYS

## (undated) DEVICE — Device: Brand: DEFENDO AIR/WATER/SUCTION AND BIOPSY VALVE

## (undated) DEVICE — CONTN GRAD MEAS TRIANG 32OZ BLK